# Patient Record
Sex: MALE | Race: WHITE | Employment: OTHER | URBAN - METROPOLITAN AREA
[De-identification: names, ages, dates, MRNs, and addresses within clinical notes are randomized per-mention and may not be internally consistent; named-entity substitution may affect disease eponyms.]

---

## 2018-11-19 ENCOUNTER — APPOINTMENT (OUTPATIENT)
Dept: CT IMAGING | Facility: CLINIC | Age: 83
DRG: 480 | End: 2018-11-19
Attending: EMERGENCY MEDICINE
Payer: MEDICARE

## 2018-11-19 ENCOUNTER — ANESTHESIA EVENT (OUTPATIENT)
Dept: SURGERY | Facility: CLINIC | Age: 83
DRG: 480 | End: 2018-11-19
Payer: MEDICARE

## 2018-11-19 ENCOUNTER — ANESTHESIA (OUTPATIENT)
Dept: SURGERY | Facility: CLINIC | Age: 83
DRG: 480 | End: 2018-11-19
Payer: MEDICARE

## 2018-11-19 ENCOUNTER — APPOINTMENT (OUTPATIENT)
Dept: GENERAL RADIOLOGY | Facility: CLINIC | Age: 83
DRG: 480 | End: 2018-11-19
Attending: EMERGENCY MEDICINE
Payer: MEDICARE

## 2018-11-19 ENCOUNTER — HOSPITAL ENCOUNTER (INPATIENT)
Facility: CLINIC | Age: 83
LOS: 4 days | Discharge: NURSING, REHAB OR LONG-TERM CARE | DRG: 480 | End: 2018-11-23
Attending: EMERGENCY MEDICINE | Admitting: HOSPITALIST
Payer: MEDICARE

## 2018-11-19 ENCOUNTER — APPOINTMENT (OUTPATIENT)
Dept: GENERAL RADIOLOGY | Facility: CLINIC | Age: 83
DRG: 480 | End: 2018-11-19
Attending: HOSPITALIST
Payer: MEDICARE

## 2018-11-19 ENCOUNTER — APPOINTMENT (OUTPATIENT)
Dept: CARDIOLOGY | Facility: CLINIC | Age: 83
DRG: 480 | End: 2018-11-19
Attending: PHYSICIAN ASSISTANT
Payer: MEDICARE

## 2018-11-19 DIAGNOSIS — S00.01XA ABRASION OF SCALP, INITIAL ENCOUNTER: ICD-10-CM

## 2018-11-19 DIAGNOSIS — I10 BENIGN ESSENTIAL HYPERTENSION: ICD-10-CM

## 2018-11-19 DIAGNOSIS — R55 SYNCOPE, UNSPECIFIED SYNCOPE TYPE: ICD-10-CM

## 2018-11-19 DIAGNOSIS — N17.9 ACUTE RENAL FAILURE SUPERIMPOSED ON CHRONIC KIDNEY DISEASE, UNSPECIFIED CKD STAGE, UNSPECIFIED ACUTE RENAL FAILURE TYPE: ICD-10-CM

## 2018-11-19 DIAGNOSIS — N18.9 ACUTE RENAL FAILURE SUPERIMPOSED ON CHRONIC KIDNEY DISEASE, UNSPECIFIED CKD STAGE, UNSPECIFIED ACUTE RENAL FAILURE TYPE: ICD-10-CM

## 2018-11-19 DIAGNOSIS — S72.145A CLOSED NONDISPLACED INTERTROCHANTERIC FRACTURE OF LEFT FEMUR, INITIAL ENCOUNTER (H): ICD-10-CM

## 2018-11-19 DIAGNOSIS — R33.9 URINARY RETENTION: Primary | ICD-10-CM

## 2018-11-19 PROBLEM — C61 MALIGNANT NEOPLASM OF PROSTATE (H): Status: ACTIVE | Noted: 2018-11-19

## 2018-11-19 PROBLEM — S72.143A INTERTROCHANTERIC FRACTURE (H): Status: ACTIVE | Noted: 2018-11-19

## 2018-11-19 PROBLEM — C64.9 MALIGNANT NEOPLASM OF KIDNEY EXCLUDING RENAL PELVIS (H): Status: ACTIVE | Noted: 2018-11-19

## 2018-11-19 PROBLEM — S72.142A FRACTURE, INTERTROCHANTERIC, LEFT FEMUR (H): Status: ACTIVE | Noted: 2018-11-19

## 2018-11-19 PROBLEM — H35.3130 BILATERAL NONEXUDATIVE AGE-RELATED MACULAR DEGENERATION: Status: ACTIVE | Noted: 2018-10-30

## 2018-11-19 PROBLEM — G89.29 CHRONIC MIDLINE LOW BACK PAIN WITHOUT SCIATICA: Status: ACTIVE | Noted: 2017-06-07

## 2018-11-19 PROBLEM — I63.9 CVA (CEREBRAL VASCULAR ACCIDENT) (H): Status: ACTIVE | Noted: 2017-08-29

## 2018-11-19 PROBLEM — M54.50 CHRONIC MIDLINE LOW BACK PAIN WITHOUT SCIATICA: Status: ACTIVE | Noted: 2017-06-07

## 2018-11-19 LAB
ANION GAP SERPL CALCULATED.3IONS-SCNC: 6 MMOL/L (ref 3–14)
BASOPHILS # BLD AUTO: 0 10E9/L (ref 0–0.2)
BASOPHILS NFR BLD AUTO: 0.2 %
BUN SERPL-MCNC: 41 MG/DL (ref 7–30)
CALCIUM SERPL-MCNC: 8 MG/DL (ref 8.5–10.1)
CHLORIDE SERPL-SCNC: 113 MMOL/L (ref 94–109)
CO2 SERPL-SCNC: 26 MMOL/L (ref 20–32)
CREAT SERPL-MCNC: 2.15 MG/DL (ref 0.66–1.25)
DIFFERENTIAL METHOD BLD: ABNORMAL
EOSINOPHIL # BLD AUTO: 0.2 10E9/L (ref 0–0.7)
EOSINOPHIL NFR BLD AUTO: 2.5 %
ERYTHROCYTE [DISTWIDTH] IN BLOOD BY AUTOMATED COUNT: 14 % (ref 10–15)
GFR SERPL CREATININE-BSD FRML MDRD: 29 ML/MIN/1.7M2
GLUCOSE BLDC GLUCOMTR-MCNC: 92 MG/DL (ref 70–99)
GLUCOSE SERPL-MCNC: 111 MG/DL (ref 70–99)
HCT VFR BLD AUTO: 40 % (ref 40–53)
HGB BLD-MCNC: 12.8 G/DL (ref 13.3–17.7)
IMM GRANULOCYTES # BLD: 0 10E9/L (ref 0–0.4)
IMM GRANULOCYTES NFR BLD: 0.2 %
INTERPRETATION ECG - MUSE: NORMAL
LYMPHOCYTES # BLD AUTO: 1.9 10E9/L (ref 0.8–5.3)
LYMPHOCYTES NFR BLD AUTO: 20.2 %
MCH RBC QN AUTO: 30.9 PG (ref 26.5–33)
MCHC RBC AUTO-ENTMCNC: 32 G/DL (ref 31.5–36.5)
MCV RBC AUTO: 97 FL (ref 78–100)
MONOCYTES # BLD AUTO: 0.9 10E9/L (ref 0–1.3)
MONOCYTES NFR BLD AUTO: 9.4 %
NEUTROPHILS # BLD AUTO: 6.4 10E9/L (ref 1.6–8.3)
NEUTROPHILS NFR BLD AUTO: 67.5 %
PLATELET # BLD AUTO: 214 10E9/L (ref 150–450)
POTASSIUM SERPL-SCNC: 4.6 MMOL/L (ref 3.4–5.3)
RBC # BLD AUTO: 4.14 10E12/L (ref 4.4–5.9)
SODIUM SERPL-SCNC: 145 MMOL/L (ref 133–144)
WBC # BLD AUTO: 9.5 10E9/L (ref 4–11)

## 2018-11-19 PROCEDURE — 25000132 ZZH RX MED GY IP 250 OP 250 PS 637: Performed by: ORTHOPAEDIC SURGERY

## 2018-11-19 PROCEDURE — 93306 TTE W/DOPPLER COMPLETE: CPT | Mod: 26 | Performed by: INTERNAL MEDICINE

## 2018-11-19 PROCEDURE — A9270 NON-COVERED ITEM OR SERVICE: HCPCS | Mod: GY | Performed by: PHYSICIAN ASSISTANT

## 2018-11-19 PROCEDURE — 71045 X-RAY EXAM CHEST 1 VIEW: CPT

## 2018-11-19 PROCEDURE — 93005 ELECTROCARDIOGRAM TRACING: CPT

## 2018-11-19 PROCEDURE — 25000128 H RX IP 250 OP 636: Performed by: REGISTERED NURSE

## 2018-11-19 PROCEDURE — 99285 EMERGENCY DEPT VISIT HI MDM: CPT | Mod: 25

## 2018-11-19 PROCEDURE — 27210794 ZZH OR GENERAL SUPPLY STERILE: Performed by: ORTHOPAEDIC SURGERY

## 2018-11-19 PROCEDURE — 25000128 H RX IP 250 OP 636: Performed by: ANESTHESIOLOGY

## 2018-11-19 PROCEDURE — 25000128 H RX IP 250 OP 636: Performed by: ORTHOPAEDIC SURGERY

## 2018-11-19 PROCEDURE — 80048 BASIC METABOLIC PNL TOTAL CA: CPT | Performed by: EMERGENCY MEDICINE

## 2018-11-19 PROCEDURE — 93306 TTE W/DOPPLER COMPLETE: CPT

## 2018-11-19 PROCEDURE — 36000065 ZZH SURGERY LEVEL 4 W FLUORO 1ST 30 MIN: Performed by: ORTHOPAEDIC SURGERY

## 2018-11-19 PROCEDURE — 90714 TD VACC NO PRESV 7 YRS+ IM: CPT | Performed by: EMERGENCY MEDICINE

## 2018-11-19 PROCEDURE — A9270 NON-COVERED ITEM OR SERVICE: HCPCS | Performed by: ORTHOPAEDIC SURGERY

## 2018-11-19 PROCEDURE — 85025 COMPLETE CBC W/AUTO DIFF WBC: CPT | Performed by: EMERGENCY MEDICINE

## 2018-11-19 PROCEDURE — 99223 1ST HOSP IP/OBS HIGH 75: CPT | Mod: AI | Performed by: PHYSICIAN ASSISTANT

## 2018-11-19 PROCEDURE — 25000125 ZZHC RX 250: Performed by: REGISTERED NURSE

## 2018-11-19 PROCEDURE — 37000008 ZZH ANESTHESIA TECHNICAL FEE, 1ST 30 MIN: Performed by: ORTHOPAEDIC SURGERY

## 2018-11-19 PROCEDURE — 25000128 H RX IP 250 OP 636: Performed by: PHYSICIAN ASSISTANT

## 2018-11-19 PROCEDURE — 25000128 H RX IP 250 OP 636: Performed by: EMERGENCY MEDICINE

## 2018-11-19 PROCEDURE — 96361 HYDRATE IV INFUSION ADD-ON: CPT

## 2018-11-19 PROCEDURE — 25000132 ZZH RX MED GY IP 250 OP 250 PS 637: Mod: GY | Performed by: PHYSICIAN ASSISTANT

## 2018-11-19 PROCEDURE — 70450 CT HEAD/BRAIN W/O DYE: CPT

## 2018-11-19 PROCEDURE — 71000012 ZZH RECOVERY PHASE 1 LEVEL 1 FIRST HR: Performed by: ORTHOPAEDIC SURGERY

## 2018-11-19 PROCEDURE — C1713 ANCHOR/SCREW BN/BN,TIS/BN: HCPCS | Performed by: ORTHOPAEDIC SURGERY

## 2018-11-19 PROCEDURE — 25500064 ZZH RX 255 OP 636: Performed by: HOSPITALIST

## 2018-11-19 PROCEDURE — 90471 IMMUNIZATION ADMIN: CPT

## 2018-11-19 PROCEDURE — 25800025 ZZH RX 258: Performed by: PHYSICIAN ASSISTANT

## 2018-11-19 PROCEDURE — 12000007 ZZH R&B INTERMEDIATE

## 2018-11-19 PROCEDURE — 37000009 ZZH ANESTHESIA TECHNICAL FEE, EACH ADDTL 15 MIN: Performed by: ORTHOPAEDIC SURGERY

## 2018-11-19 PROCEDURE — 00000146 ZZHCL STATISTIC GLUCOSE BY METER IP

## 2018-11-19 PROCEDURE — 73502 X-RAY EXAM HIP UNI 2-3 VIEWS: CPT

## 2018-11-19 PROCEDURE — 40000169 ZZH STATISTIC PRE-PROCEDURE ASSESSMENT I: Performed by: ORTHOPAEDIC SURGERY

## 2018-11-19 PROCEDURE — 40000277 XR SURGERY CARM FLUORO LESS THAN 5 MIN W STILLS

## 2018-11-19 PROCEDURE — 96374 THER/PROPH/DIAG INJ IV PUSH: CPT

## 2018-11-19 PROCEDURE — 0QS706Z REPOSITION LEFT UPPER FEMUR WITH INTRAMEDULLARY INTERNAL FIXATION DEVICE, OPEN APPROACH: ICD-10-PCS | Performed by: ORTHOPAEDIC SURGERY

## 2018-11-19 PROCEDURE — 71000013 ZZH RECOVERY PHASE 1 LEVEL 1 EA ADDTL HR: Performed by: ORTHOPAEDIC SURGERY

## 2018-11-19 PROCEDURE — C1769 GUIDE WIRE: HCPCS | Performed by: ORTHOPAEDIC SURGERY

## 2018-11-19 PROCEDURE — 36000063 ZZH SURGERY LEVEL 4 EA 15 ADDTL MIN: Performed by: ORTHOPAEDIC SURGERY

## 2018-11-19 PROCEDURE — 25000566 ZZH SEVOFLURANE, EA 15 MIN: Performed by: ORTHOPAEDIC SURGERY

## 2018-11-19 DEVICE — IMP SCR SYN TFNA FENESTRATED LAG 95MM 04.038.195S: Type: IMPLANTABLE DEVICE | Site: HIP | Status: FUNCTIONAL

## 2018-11-19 DEVICE — IMPLANTABLE DEVICE: Type: IMPLANTABLE DEVICE | Site: HIP | Status: FUNCTIONAL

## 2018-11-19 RX ORDER — ONDANSETRON 4 MG/1
4 TABLET, ORALLY DISINTEGRATING ORAL EVERY 6 HOURS PRN
Status: DISCONTINUED | OUTPATIENT
Start: 2018-11-19 | End: 2018-11-19

## 2018-11-19 RX ORDER — ACETAMINOPHEN 650 MG/1
650 SUPPOSITORY RECTAL EVERY 4 HOURS PRN
Status: DISCONTINUED | OUTPATIENT
Start: 2018-11-19 | End: 2018-11-19

## 2018-11-19 RX ORDER — EPHEDRINE SULFATE 50 MG/ML
INJECTION, SOLUTION INTRAMUSCULAR; INTRAVENOUS; SUBCUTANEOUS PRN
Status: DISCONTINUED | OUTPATIENT
Start: 2018-11-19 | End: 2018-11-19

## 2018-11-19 RX ORDER — MONTELUKAST SODIUM 4 MG/1
1 TABLET, CHEWABLE ORAL
Status: ON HOLD | COMMUNITY
Start: 2018-08-13 | End: 2018-11-19

## 2018-11-19 RX ORDER — AMLODIPINE BESYLATE 5 MG/1
5 TABLET ORAL DAILY
Status: ON HOLD | COMMUNITY
Start: 2018-11-05 | End: 2018-11-23

## 2018-11-19 RX ORDER — PROCHLORPERAZINE MALEATE 5 MG
5 TABLET ORAL EVERY 6 HOURS PRN
Status: DISCONTINUED | OUTPATIENT
Start: 2018-11-19 | End: 2018-11-19

## 2018-11-19 RX ORDER — AMOXICILLIN 250 MG
2 CAPSULE ORAL 2 TIMES DAILY
Status: DISCONTINUED | OUTPATIENT
Start: 2018-11-19 | End: 2018-11-23 | Stop reason: HOSPADM

## 2018-11-19 RX ORDER — HYDROMORPHONE HYDROCHLORIDE 1 MG/ML
0.2 INJECTION, SOLUTION INTRAMUSCULAR; INTRAVENOUS; SUBCUTANEOUS
Status: DISCONTINUED | OUTPATIENT
Start: 2018-11-19 | End: 2018-11-23 | Stop reason: HOSPADM

## 2018-11-19 RX ORDER — POLYETHYLENE GLYCOL 3350 17 G/17G
17 POWDER, FOR SOLUTION ORAL DAILY PRN
Status: DISCONTINUED | OUTPATIENT
Start: 2018-11-19 | End: 2018-11-19

## 2018-11-19 RX ORDER — ACETAMINOPHEN 325 MG/1
975 TABLET ORAL 3 TIMES DAILY
Status: DISCONTINUED | OUTPATIENT
Start: 2018-11-19 | End: 2018-11-19

## 2018-11-19 RX ORDER — GLYCOPYRROLATE 0.2 MG/ML
INJECTION, SOLUTION INTRAMUSCULAR; INTRAVENOUS PRN
Status: DISCONTINUED | OUTPATIENT
Start: 2018-11-19 | End: 2018-11-19

## 2018-11-19 RX ORDER — HYDROMORPHONE HYDROCHLORIDE 1 MG/ML
.3-.5 INJECTION, SOLUTION INTRAMUSCULAR; INTRAVENOUS; SUBCUTANEOUS
Status: DISCONTINUED | OUTPATIENT
Start: 2018-11-19 | End: 2018-11-19

## 2018-11-19 RX ORDER — CEFAZOLIN SODIUM 2 G/100ML
2 INJECTION, SOLUTION INTRAVENOUS
Status: COMPLETED | OUTPATIENT
Start: 2018-11-19 | End: 2018-11-19

## 2018-11-19 RX ORDER — PROPOFOL 10 MG/ML
INJECTION, EMULSION INTRAVENOUS PRN
Status: DISCONTINUED | OUTPATIENT
Start: 2018-11-19 | End: 2018-11-19

## 2018-11-19 RX ORDER — VIT A/VIT C/VIT E/ZINC/COPPER 2148-113
1 TABLET ORAL 2 TIMES DAILY
COMMUNITY
Start: 2018-10-30

## 2018-11-19 RX ORDER — GABAPENTIN 300 MG/1
1500 CAPSULE ORAL AT BEDTIME
COMMUNITY
Start: 2018-10-30

## 2018-11-19 RX ORDER — ONDANSETRON 2 MG/ML
4 INJECTION INTRAMUSCULAR; INTRAVENOUS EVERY 6 HOURS PRN
Status: DISCONTINUED | OUTPATIENT
Start: 2018-11-19 | End: 2018-11-19

## 2018-11-19 RX ORDER — PROCHLORPERAZINE 25 MG
12.5 SUPPOSITORY, RECTAL RECTAL EVERY 12 HOURS PRN
Status: DISCONTINUED | OUTPATIENT
Start: 2018-11-19 | End: 2018-11-19

## 2018-11-19 RX ORDER — ONDANSETRON 2 MG/ML
4 INJECTION INTRAMUSCULAR; INTRAVENOUS EVERY 6 HOURS PRN
Status: DISCONTINUED | OUTPATIENT
Start: 2018-11-19 | End: 2018-11-23 | Stop reason: HOSPADM

## 2018-11-19 RX ORDER — DEXAMETHASONE SODIUM PHOSPHATE 4 MG/ML
INJECTION, SOLUTION INTRA-ARTICULAR; INTRALESIONAL; INTRAMUSCULAR; INTRAVENOUS; SOFT TISSUE PRN
Status: DISCONTINUED | OUTPATIENT
Start: 2018-11-19 | End: 2018-11-19

## 2018-11-19 RX ORDER — ONDANSETRON 4 MG/1
4 TABLET, ORALLY DISINTEGRATING ORAL EVERY 6 HOURS PRN
Status: DISCONTINUED | OUTPATIENT
Start: 2018-11-19 | End: 2018-11-23 | Stop reason: HOSPADM

## 2018-11-19 RX ORDER — MORPHINE SULFATE 2 MG/ML
2 INJECTION, SOLUTION INTRAMUSCULAR; INTRAVENOUS ONCE
Status: COMPLETED | OUTPATIENT
Start: 2018-11-19 | End: 2018-11-19

## 2018-11-19 RX ORDER — NEOSTIGMINE METHYLSULFATE 1 MG/ML
VIAL (ML) INJECTION PRN
Status: DISCONTINUED | OUTPATIENT
Start: 2018-11-19 | End: 2018-11-19

## 2018-11-19 RX ORDER — LIDOCAINE HYDROCHLORIDE 20 MG/ML
INJECTION, SOLUTION INFILTRATION; PERINEURAL PRN
Status: DISCONTINUED | OUTPATIENT
Start: 2018-11-19 | End: 2018-11-19

## 2018-11-19 RX ORDER — SODIUM CHLORIDE 9 MG/ML
INJECTION, SOLUTION INTRAVENOUS CONTINUOUS
Status: DISCONTINUED | OUTPATIENT
Start: 2018-11-19 | End: 2018-11-20

## 2018-11-19 RX ORDER — NALOXONE HYDROCHLORIDE 0.4 MG/ML
.1-.4 INJECTION, SOLUTION INTRAMUSCULAR; INTRAVENOUS; SUBCUTANEOUS
Status: DISCONTINUED | OUTPATIENT
Start: 2018-11-19 | End: 2018-11-19

## 2018-11-19 RX ORDER — AMOXICILLIN 250 MG
2 CAPSULE ORAL 2 TIMES DAILY PRN
Status: DISCONTINUED | OUTPATIENT
Start: 2018-11-19 | End: 2018-11-19

## 2018-11-19 RX ORDER — ONDANSETRON 2 MG/ML
INJECTION INTRAMUSCULAR; INTRAVENOUS PRN
Status: DISCONTINUED | OUTPATIENT
Start: 2018-11-19 | End: 2018-11-19

## 2018-11-19 RX ORDER — LOPERAMIDE HCL 2 MG
2 CAPSULE ORAL 2 TIMES DAILY PRN
COMMUNITY

## 2018-11-19 RX ORDER — ACETAMINOPHEN 325 MG/1
975 TABLET ORAL EVERY 8 HOURS
Status: COMPLETED | OUTPATIENT
Start: 2018-11-19 | End: 2018-11-22

## 2018-11-19 RX ORDER — AMOXICILLIN 250 MG
1 CAPSULE ORAL 2 TIMES DAILY
Status: DISCONTINUED | OUTPATIENT
Start: 2018-11-19 | End: 2018-11-23 | Stop reason: HOSPADM

## 2018-11-19 RX ORDER — OXYCODONE HYDROCHLORIDE 5 MG/1
5-10 TABLET ORAL
Status: DISCONTINUED | OUTPATIENT
Start: 2018-11-19 | End: 2018-11-19

## 2018-11-19 RX ORDER — BISACODYL 10 MG
10 SUPPOSITORY, RECTAL RECTAL DAILY PRN
Status: DISCONTINUED | OUTPATIENT
Start: 2018-11-19 | End: 2018-11-19

## 2018-11-19 RX ORDER — FENTANYL CITRATE 50 UG/ML
INJECTION, SOLUTION INTRAMUSCULAR; INTRAVENOUS PRN
Status: DISCONTINUED | OUTPATIENT
Start: 2018-11-19 | End: 2018-11-19

## 2018-11-19 RX ORDER — BENZTROPINE MESYLATE 1 MG/1
1-2 TABLET ORAL 3 TIMES DAILY PRN
Status: DISCONTINUED | OUTPATIENT
Start: 2018-11-19 | End: 2018-11-19

## 2018-11-19 RX ORDER — ONDANSETRON 2 MG/ML
4 INJECTION INTRAMUSCULAR; INTRAVENOUS EVERY 6 HOURS
Status: DISPENSED | OUTPATIENT
Start: 2018-11-19 | End: 2018-11-20

## 2018-11-19 RX ORDER — LANOLIN ALCOHOL/MO/W.PET/CERES
1000 CREAM (GRAM) TOPICAL DAILY
COMMUNITY
Start: 2018-10-30

## 2018-11-19 RX ORDER — SODIUM CHLORIDE 9 MG/ML
INJECTION, SOLUTION INTRAVENOUS CONTINUOUS
Status: DISCONTINUED | OUTPATIENT
Start: 2018-11-19 | End: 2018-11-19

## 2018-11-19 RX ORDER — CEFAZOLIN SODIUM 1 G/3ML
1 INJECTION, POWDER, FOR SOLUTION INTRAMUSCULAR; INTRAVENOUS SEE ADMIN INSTRUCTIONS
Status: DISCONTINUED | OUTPATIENT
Start: 2018-11-19 | End: 2018-11-19 | Stop reason: HOSPADM

## 2018-11-19 RX ORDER — AMOXICILLIN 250 MG
1 CAPSULE ORAL 2 TIMES DAILY PRN
Status: DISCONTINUED | OUTPATIENT
Start: 2018-11-19 | End: 2018-11-19

## 2018-11-19 RX ORDER — NALOXONE HYDROCHLORIDE 0.4 MG/ML
.1-.4 INJECTION, SOLUTION INTRAMUSCULAR; INTRAVENOUS; SUBCUTANEOUS
Status: DISCONTINUED | OUTPATIENT
Start: 2018-11-19 | End: 2018-11-23 | Stop reason: HOSPADM

## 2018-11-19 RX ORDER — ASPIRIN 81 MG/1
81 TABLET ORAL DAILY
Status: ON HOLD | COMMUNITY
Start: 2018-10-30 | End: 2018-11-20

## 2018-11-19 RX ORDER — CEFAZOLIN SODIUM 2 G/100ML
2 INJECTION, SOLUTION INTRAVENOUS EVERY 8 HOURS
Status: COMPLETED | OUTPATIENT
Start: 2018-11-19 | End: 2018-11-20

## 2018-11-19 RX ORDER — PROCHLORPERAZINE MALEATE 5 MG
5 TABLET ORAL EVERY 6 HOURS PRN
Status: DISCONTINUED | OUTPATIENT
Start: 2018-11-19 | End: 2018-11-23 | Stop reason: HOSPADM

## 2018-11-19 RX ORDER — ACETAMINOPHEN 325 MG/1
650 TABLET ORAL EVERY 4 HOURS PRN
Status: DISCONTINUED | OUTPATIENT
Start: 2018-11-22 | End: 2018-11-23 | Stop reason: HOSPADM

## 2018-11-19 RX ORDER — LIDOCAINE 40 MG/G
CREAM TOPICAL
Status: DISCONTINUED | OUTPATIENT
Start: 2018-11-19 | End: 2018-11-23 | Stop reason: HOSPADM

## 2018-11-19 RX ORDER — SODIUM CHLORIDE, SODIUM LACTATE, POTASSIUM CHLORIDE, CALCIUM CHLORIDE 600; 310; 30; 20 MG/100ML; MG/100ML; MG/100ML; MG/100ML
INJECTION, SOLUTION INTRAVENOUS CONTINUOUS
Status: DISCONTINUED | OUTPATIENT
Start: 2018-11-19 | End: 2018-11-19 | Stop reason: HOSPADM

## 2018-11-19 RX ORDER — OXYCODONE HYDROCHLORIDE 5 MG/1
5 TABLET ORAL
Status: DISCONTINUED | OUTPATIENT
Start: 2018-11-19 | End: 2018-11-23 | Stop reason: HOSPADM

## 2018-11-19 RX ADMIN — GABAPENTIN 1500 MG: 400 CAPSULE ORAL at 21:35

## 2018-11-19 RX ADMIN — FENTANYL CITRATE 25 MCG: 50 INJECTION, SOLUTION INTRAMUSCULAR; INTRAVENOUS at 16:50

## 2018-11-19 RX ADMIN — SODIUM CHLORIDE 1000 ML: 9 INJECTION, SOLUTION INTRAVENOUS at 08:06

## 2018-11-19 RX ADMIN — FENTANYL CITRATE 50 MCG: 50 INJECTION, SOLUTION INTRAMUSCULAR; INTRAVENOUS at 16:10

## 2018-11-19 RX ADMIN — LIDOCAINE HYDROCHLORIDE 100 MG: 20 INJECTION, SOLUTION INFILTRATION; PERINEURAL at 15:46

## 2018-11-19 RX ADMIN — CEFAZOLIN SODIUM 2 G: 2 INJECTION, SOLUTION INTRAVENOUS at 20:22

## 2018-11-19 RX ADMIN — NEOSTIGMINE METHYLSULFATE 4 MG: 1 INJECTION, SOLUTION INTRAVENOUS at 16:41

## 2018-11-19 RX ADMIN — Medication 10 MG: at 15:52

## 2018-11-19 RX ADMIN — FENTANYL CITRATE 50 MCG: 50 INJECTION, SOLUTION INTRAMUSCULAR; INTRAVENOUS at 16:21

## 2018-11-19 RX ADMIN — CLOSTRIDIUM TETANI TOXOID ANTIGEN (FORMALDEHYDE INACTIVATED) AND CORYNEBACTERIUM DIPHTHERIAE TOXOID ANTIGEN (FORMALDEHYDE INACTIVATED) 0.5 ML: 5; 2 INJECTION, SUSPENSION INTRAMUSCULAR at 10:07

## 2018-11-19 RX ADMIN — ACETAMINOPHEN 975 MG: 325 TABLET, FILM COATED ORAL at 21:35

## 2018-11-19 RX ADMIN — RANITIDINE 150 MG: 150 TABLET ORAL at 21:36

## 2018-11-19 RX ADMIN — FENTANYL CITRATE 25 MCG: 50 INJECTION, SOLUTION INTRAMUSCULAR; INTRAVENOUS at 16:43

## 2018-11-19 RX ADMIN — PROPOFOL 130 MG: 10 INJECTION, EMULSION INTRAVENOUS at 15:46

## 2018-11-19 RX ADMIN — GLYCOPYRROLATE 0.6 MG: 0.2 INJECTION, SOLUTION INTRAMUSCULAR; INTRAVENOUS at 16:41

## 2018-11-19 RX ADMIN — HYDROMORPHONE HYDROCHLORIDE 0.5 MG: 1 INJECTION, SOLUTION INTRAMUSCULAR; INTRAVENOUS; SUBCUTANEOUS at 16:40

## 2018-11-19 RX ADMIN — SENNOSIDES AND DOCUSATE SODIUM 1 TABLET: 8.6; 5 TABLET ORAL at 20:22

## 2018-11-19 RX ADMIN — MORPHINE SULFATE 2 MG: 2 INJECTION, SOLUTION INTRAMUSCULAR; INTRAVENOUS at 08:21

## 2018-11-19 RX ADMIN — ACETAMINOPHEN 975 MG: 325 TABLET, FILM COATED ORAL at 11:19

## 2018-11-19 RX ADMIN — HUMAN ALBUMIN MICROSPHERES AND PERFLUTREN 9 ML: 10; .22 INJECTION, SOLUTION INTRAVENOUS at 15:45

## 2018-11-19 RX ADMIN — CEFAZOLIN SODIUM 2 G: 2 INJECTION, SOLUTION INTRAVENOUS at 16:06

## 2018-11-19 RX ADMIN — ONDANSETRON 4 MG: 2 INJECTION INTRAMUSCULAR; INTRAVENOUS at 16:34

## 2018-11-19 RX ADMIN — DEXTROSE AND SODIUM CHLORIDE: 5; 450 INJECTION, SOLUTION INTRAVENOUS at 11:19

## 2018-11-19 RX ADMIN — ROCURONIUM BROMIDE 40 MG: 10 INJECTION INTRAVENOUS at 15:46

## 2018-11-19 RX ADMIN — Medication 10 MG: at 16:34

## 2018-11-19 RX ADMIN — ONDANSETRON 4 MG: 2 INJECTION INTRAMUSCULAR; INTRAVENOUS at 21:35

## 2018-11-19 RX ADMIN — FENTANYL CITRATE 50 MCG: 50 INJECTION, SOLUTION INTRAMUSCULAR; INTRAVENOUS at 15:46

## 2018-11-19 RX ADMIN — SODIUM CHLORIDE: 9 INJECTION, SOLUTION INTRAVENOUS at 20:23

## 2018-11-19 RX ADMIN — DEXAMETHASONE SODIUM PHOSPHATE 4 MG: 4 INJECTION, SOLUTION INTRA-ARTICULAR; INTRALESIONAL; INTRAMUSCULAR; INTRAVENOUS; SOFT TISSUE at 16:04

## 2018-11-19 RX ADMIN — SODIUM CHLORIDE, POTASSIUM CHLORIDE, SODIUM LACTATE AND CALCIUM CHLORIDE: 600; 310; 30; 20 INJECTION, SOLUTION INTRAVENOUS at 15:40

## 2018-11-19 ASSESSMENT — ACTIVITIES OF DAILY LIVING (ADL)
COGNITION: 0 - NO COGNITION ISSUES REPORTED
FALL_HISTORY_WITHIN_LAST_SIX_MONTHS: YES
RETIRED_COMMUNICATION: 0-->UNDERSTANDS/COMMUNICATES WITHOUT DIFFICULTY
RETIRED_EATING: 0-->INDEPENDENT
SWALLOWING: 0-->SWALLOWS FOODS/LIQUIDS WITHOUT DIFFICULTY
AMBULATION: 0-->INDEPENDENT
TRANSFERRING: 0-->INDEPENDENT
ADLS_ACUITY_SCORE: 9
WHICH_OF_THE_ABOVE_FUNCTIONAL_RISKS_HAD_A_RECENT_ONSET_OR_CHANGE?: AMBULATION;TRANSFERRING;TOILETING;BATHING
ADLS_ACUITY_SCORE: 13
TOILETING: 0-->INDEPENDENT
BATHING: 0-->INDEPENDENT
NUMBER_OF_TIMES_PATIENT_HAS_FALLEN_WITHIN_LAST_SIX_MONTHS: 11
DRESS: 0-->INDEPENDENT

## 2018-11-19 ASSESSMENT — ENCOUNTER SYMPTOMS
NUMBNESS: 0
ABDOMINAL PAIN: 0
HEADACHES: 0
BACK PAIN: 0
SHORTNESS OF BREATH: 0
VOMITING: 0
LIGHT-HEADEDNESS: 0
NAUSEA: 0
WEAKNESS: 0
DIARRHEA: 0
DIZZINESS: 1
FEVER: 0
NECK PAIN: 0
MYALGIAS: 1
WOUND: 1

## 2018-11-19 NOTE — PHARMACY-ADMISSION MEDICATION HISTORY
Admission medication history interview status for the 11/19/2018  admission is complete. See EPIC admission navigator for prior to admission medications     Medication history source reliability:Good    Actions taken by pharmacist (provider contacted, etc): Spoke with patient. He was not sure of all medications. Verified with Violeta Jennings and care everywhere    Additional medication history information not noted on PTA med list :None    Medication reconciliation/reorder completed by provider prior to medication history? No    Time spent in this activity: 30 mins    Prior to Admission medications    Medication Sig Last Dose Taking? Auth Provider   amLODIPine (NORVASC) 5 MG tablet Take 5 mg by mouth daily  11/18/2018 at am Yes Reported, Patient   aspirin 81 MG EC tablet Take 81 mg by mouth daily  11/18/2018 at am Yes Reported, Patient   cyanocobalamin (VITAMIN  B-12) 1000 MCG tablet Take 1,000 mcg by mouth daily  Unknown Yes Reported, Patient   gabapentin (NEURONTIN) 300 MG capsule Take 1,500 mg by mouth At Bedtime  11/18/2018 at hs Yes Reported, Patient   loperamide (IMODIUM) 2 MG capsule Take 2 mg by mouth 2 times daily as needed for diarrhea prn Yes Unknown, Entered By History   Multiple Vitamins-Minerals (PRESERVISION AREDS) TABS Take 1 tablet by mouth 2 times daily  11/18/2018 at pm Yes Reported, Patient

## 2018-11-19 NOTE — ED PROVIDER NOTES
History     Chief Complaint:  Fall    HPI   Sourav Fine is a 89 year old male with a history of hypertension, cancer, and CVA who presents to the ED via EMS for evaluation after a fall. The patient reports feeling well going to bed last night. This morning, around 0600, he got up to use the bathroom and while in the bathroom, he became very dizzy and unsteady, causing him to fall to the ground. He hit his head and left hip on the floor during the fall but denies any loss of consciousness. EMS were unable to ambulate the patient due to his non-bearing left side. Here, patient reports still having signficiant left hip pain. He also suffered a laceration to the left posterior skull during the fall. Bleeding is controlled on arrival. Patient is not anticoagulated. He has no history of hip fractures. He denies any chest pain or shortness of breath preceding the fall. The patient reports that he does not usually feel dizzy with standing. He did start amlodipine last week but stopped taking it after one dose due to increased dizziness. He has not been taking this medication since that one dose. Last td was in 2005.    Allergies:  No known drug allergies    Medications:    amLODIPine (NORVASC) 5 MG tablet  aspirin 81 MG EC tablet  colestipol (COLESTID) 1 g tablet  cyanocobalamin (VITAMIN  B-12) 1000 MCG tablet  gabapentin (NEURONTIN) 300 MG capsule  Multiple Vitamins-Minerals (PRESERVISION AREDS) TABS     Past Medical History:    Arthritis  Malignant neoplasm of prostate  Bilateral nonexudative age-related macular degeneration  Malignant neoplasm of kidney excluding renal pelvis  Hypertension   Cervicalgia  Neuropathy  ED  Other nonallopathic lesion of cervical region  ILIR  Malignant neoplasm of skin  Low vitamin B12 level  CVA  Chronic midline low back pain without sciatica    Past Surgical History:    Orthopedic surgery    Family History:    History reviewed. No pertinent family history.     Social History:  Smoking  "status: Former  Alcohol use: Yes  Marital Status:       Review of Systems   Constitutional: Negative for fever.   Respiratory: Negative for shortness of breath.    Cardiovascular: Negative for chest pain.   Gastrointestinal: Negative for abdominal pain, diarrhea, nausea and vomiting.   Musculoskeletal: Positive for gait problem and myalgias (left hip). Negative for back pain and neck pain.   Skin: Positive for wound (left posterior skull).   Neurological: Positive for dizziness. Negative for syncope, weakness, light-headedness, numbness and headaches.   All other systems reviewed and are negative.    Physical Exam   First Vitals:  BP: 169/82  Heart Rate: 63  Temp: 98  F (36.7  C)  Resp: 16  Height: 177.8 cm (5' 10\")  Weight: 81.6 kg (180 lb)  SpO2: 97 %    Physical Exam  General:  Sitting on bed. Pt in no significant distress.  Talkative.   HENT:  No obvious trauma to head aside from a abrasion to the left posterior skull. Negative garland's sign and negative raccoon eyes bilaterally.  Right Ear:  External ear normal.   Left Ear:  External ear normal.   Nose:  Nose normal. No epistaxis.  Eyes:  Conjunctivae and EOM are normal. Pupils are equal, round, and reactive.   Neck: Normal range of motion. Neck supple. No tracheal deviation present. No midline cervical neck tenderness, deformity, step off or pain in the midline with ROM.  CV:  Normal heart sounds. No murmur heard.  Pulm/Chest: Effort normal and breath sounds normal.   Abd: Soft. No distension. There is no tenderness. There is no rigidity, no rebound and no guarding.   M/S: Normal range of motion. No pain to palpation or deformity of all 4 extremities other than positive pain to left proximal hip and with internal/external rotation of hip. Pelvis stable to compression. No pain to palpation of step off to thoracic spine. DP pulses +1 bilaterally.  Neuro: Alert. CN II-XII Grossly intact. GCS 15.  Skin: Skin is warm and dry. No rash noted. Not diaphoretic. "   Psych: Normal mood and affect. Behavior is normal.     Emergency Department Course   ECG (07:56:42):  Rate 63 bpm. ME interval 188. QRS duration 142. QT/QTc 458/468. P-R-T axes 76 -75 52. Normal sinus rhythm. Right bundle branch block. Left anterior fascicular block. Bifascicular block. Abnormal ECG. Interpreted at 0757 by David Denton DO.    Imaging:  Radiographic findings were communicated with the patient who voiced understanding of the findings.    CT-scan Head w/o contrast:  IMPRESSION:  No acute intracranial abnormality. Old left frontal infarct.  Result per radiology.     X-ray Left Pelvis w/ Hip, 1 views:  IMPRESSION:  Minimally displaced intertrochanteric fracture of the left hip. There are degenerative changes of both hips, left worse than right. Prostate seeds noted.  Result per radiology.     X-ray Chest, 2 views:  IMPRESSION: Heart size upper normal but similar to prior. Pulmonary vascularity within normal limits. The lungs are clear. No pneumothorax or pleural effusion. There are lower left lateral rib fractures of uncertain chronicity.  Result per radiology.     Laboratory:  CBC: HGB 12.8 (L) o/w WNL (WBC 9.5, )  BMP:  (H), Glucose 111 (H), BUN 41 (H), Chloride 113 (H), Creatinine 2.15 (H), Calcium 8.0 (L), GFR 29 (L) o/w WNL  0803 - Glucose: 92    Interventions:  0806: NS 1L IV Bolus  0821: Morphine 2mg IV injection  Td 0.5 ml IM    Emergency Department Course:  The patient arrived in the emergency department via EMS.  Past medical records, nursing notes, and vitals reviewed.  0810: I performed an exam of the patient and obtained history, as documented above. GCS 15.    IV inserted and blood drawn.    The patient was sent for a CT while in the emergency department, findings above.    0835: Orthopedics notified and made aware of the hip fracture.    Findings and plan explained to the Patient who consents to admission.     0837: Discussed the patient with SOFÍA Sosa of  orthopedics, who will admit the patient to a medical bed for further monitoring, evaluation, and treatment.     0848: I discussed the case with Dr. Clemente regarding the patient.    Impression & Plan      Medical Decision Making:  Sourav Fine is a very pleasant 89 year old male who presents for evaluation of left hip pain after a fall this morning.  The cause of the fall is syncope.  The patient has had dizziness for the past week periodically.  He reports it started after starting amiodarone.  He has not been on this since then.  He has no dizziness now.  The patient got up to use the bathroom, quickly turned his head to close the door behind him and felt the onset of dizziness.  He immediately fell and hit his head.  There is no LOC.  A CT of the head shows no intracranial bleed.  The patient's scalp has an abrasion but no laceration requiring repair CMS is intact distally in the extremity. Td updated today. Xrays reveal a fracture of the hip that will need orthopedic consultation and likely surgery.  The patients head to toe trauma exam is otherwise normal at this time and no further trauma workup is needed as I believe there is no signs of serious head, neck, chest, spinal, extremity or abdominal injuries.  The patient does have acute on chronic renal insufficiency.  This may be contributing to his syncope.  The patient is hemodynamically stable and hemoglobin is normal.  There is no fever or leukocytosis to suggest infection.  He has no flank pain to suggest kidney stone.  Will admit to medicine for further cares and ortho consultation.  Pain control achieved.      Diagnosis:    ICD-10-CM   1. Closed nondisplaced intertrochanteric fracture of left femur, initial encounter (H) S72.145A   2. Abrasion of scalp, initial encounter S00.01XA   3. Syncope, unspecified syncope type R55       Disposition:  Admitted to Dr. Clemente.      Sheila Sahni  11/19/2018    EMERGENCY DEPARTMENT  I, Sheila Sahni, am serving as a  scribe at 8:10 AM on 11/19/2018 to document services personally performed by David Denton DO based on my observations and the provider's statements to me.        David Denton DO  11/19/18 0929

## 2018-11-19 NOTE — IP AVS SNAPSHOT
` ` Patient Information     Patient Name Sex     Sourav Fine ([id number]) Male 11/15/1925       Room Bed    5504 5504-02      Patient Demographics     Address Phone    [address]   [place] [address] [phone] (Home)  [phone] (Work)  [phone] (Mobile)      Patient Ethnicity & Race     Ethnic Group Patient Race     White      Emergency Contact(s)     Name Relation Home Work Mobile    Opal Fine Spouse [phone]        Documents on File        Status Date Received Description       Documents for the Patient    Privacy Notice - [place] Received 12     Insurance Card Received 12     External Medication Information Consent       Patient ID Received 12     Consent for Services - Hospital/Clinic Received () 12     Consent for EHR Access  13 Copied from existing Consent for services - C/HOD collected on 2012    [facility] Specified Other       Insurance Card Received 14     Insurance Card Received 14     Consent for Services - Hospital/Clinic Received () 14     Consent for Services - Hospital and Clinic Received 18     HIE Auth Received 18        Documents for the Encounter    CMS IM for Patient Signature Received 18     EMS/Ambulance Record  18 [place] DEPARTMENT      Admission Information     Attending Provider Admitting Provider Admission Type Admission Date/Time    Mingo Wang MD Nemanich, Joseph Patrick, MD Emergency 18  0747    Discharge Date Hospital Service Auth/Cert Status Service Area     Hospitalist [facility] SERVICES    Unit Room/Bed Admission Status       [facility] ORTHO SPEC UNIT 5504/5504-02 Admission (Confirmed)       Admission     Complaint    Intertrochanteric fracture (H), HIP FRACTURE., Fracture, intertrochanteric, left femur (H)      Hospital Account     Name Acct ID Class Status Primary Coverage    Sourav Fine [id number] Inpatient Open  MEDICARE - MEDICARE FOR HB SUPPLEMENT            Guarantor Account (for Hospital Account #[id number])     Name Relation to Pt Service Area Active? Acct Type    Sourav Fine Self FCS Yes Personal/Family    Address Phone          [address]  [place] [address] [phone](H)  [phone](O)              Coverage Information (for Hospital Account #[id number])     1. MEDICARE/MEDICARE FOR HB SUPPLEMENT     F/O Payor/Plan Precert #    MEDICARE/MEDICARE FOR HB SUPPLEMENT     Subscriber Subscriber #    Sourav Fine [id number]    Address Phone    [employer] CLAIMS  [address]  [place] [address] [phone]          2. MEDICA/MEDICA PRIME SOLUTION     F/O Payor/Plan Precert #    MEDICA/MEDICA PRIME SOLUTION     Subscriber Subscriber #    Sourav Fine [id number]    Address Phone    [address] [address]  [place] [address] [phone]

## 2018-11-19 NOTE — PLAN OF CARE
Problem: Patient Care Overview  Goal: Plan of Care/Patient Progress Review  PT/OT: Evaluation orders received, pt currently on bed rest with plan for OR this evening secondary to intertrochanteric hip fracture, post fall. Will plan for evaluations post op.

## 2018-11-19 NOTE — PROGRESS NOTES
Aware of patient in ED with left intertrochanteric femur fracture    Will plan for OR this evening as OR time permits for ORIF  NPO  Hold ASA  Bed rest  Pain medication as needed  Hospitalist for pre-op optimization    Formal consult to follow  Sheila King PAC

## 2018-11-19 NOTE — PLAN OF CARE
Problem: Patient Care Overview  Goal: Plan of Care/Patient Progress Review  Outcome: No Change  Pt A/O X4. VSS on RA. CMS intact. Head laceration dried drainage, open to air. Bedrest. Due to void. Tele. Managing pain with tylenol. Went for surgery at 1430.

## 2018-11-19 NOTE — ED NOTES
"[facility]  ED Nurse Handoff Report    ED Chief complaint: Fall (patient felt dizzy this AM, walked to bathroom and fell.  Lac to R head.  No LOC.  No thinners.  C/O L hip pain.  CMS intact.  Pt was unable to ambulate after fall.  Normally independent.)      ED Diagnosis:   Final diagnoses:   Closed nondisplaced intertrochanteric fracture of left femur, initial encounter (H)       Code Status: Full Code    Allergies: No Known Allergies    Activity level - Baseline/Home:  Independent    Activity Level - Current:   Bedrest d/t fx hip     Needed?: No    Isolation: No  Infection: Not Applicable  Bariatric?: No    Vital Signs:   Vitals:    11/19/18 0748   BP: 169/82   Resp: 16   Temp: 98  F (36.7  C)   TempSrc: Oral   SpO2: 97%   Weight: 81.6 kg (180 lb)   Height: 1.778 m (5' 10\")       Cardiac Rhythm: ,   Cardiac  Cardiac Rhythm: Normal sinus rhythm    Pain level: 0-10 Pain Scale: 9    Is this patient confused?: No   [place] - Suicide Severity Rating Scale Completed?  Yes  If yes, what color did the patient score?  White    Patient Report: Initial Complaint: Fall  Focused Assessment: Pt up to bathroom this AM, felt dizzy and fell sustaining L hip fx.  Also hit head, small lac, CT head negative.  Pt normally independent, lives with spouse.  A/o x4.    Tests Performed: blood work/EKG/CT/XR  Abnormal Results: creatinine  2.15  Treatments provided: IVF, MS 2mg    Family Comments: none, patient reports spouse on the way    OBS brochure/video discussed/provided to patient/family: N/A              Name of person given brochure if not patient:               Relationship to patient:     ED Medications:   Medications   0.9% sodium chloride BOLUS (1,000 mLs Intravenous New Bag 11/19/18 0806)   morphine (PF) injection 2 mg (2 mg Intravenous Given 11/19/18 0821)       Drips infusing?:  No    For the majority of the shift this patient was Green.   Interventions performed were .    Severe Sepsis OR Septic " Shock Diagnosis Present: No    To be done/followed up on inpatient unit:  na    ED NURSE PHONE NUMBER: *[phone]

## 2018-11-19 NOTE — PLAN OF CARE
"Problem: Patient Care Overview  Goal: Plan of Care/Patient Progress Review  PT/OT: Orders received, chart reviewed. Noting \"plan for OR this evening pending pre-operative assessment\" for repair of L hip fracture. Evals re-scheduled. Please update activity/weightbearing orders as appropriate post-op.      "

## 2018-11-19 NOTE — ED NOTES
Bed: ED13  Expected date: 11/19/18  Expected time: 7:33 AM  Means of arrival: Ambulance  Comments:  [place] 89m fall, head lac, hip pain ETA 3845

## 2018-11-19 NOTE — IP AVS SNAPSHOT
"` `     [place] [address] [facility]: [phone]                                              INTERAGENCY TRANSFER FORM - NURSING   2018                    Hospital Admission Date: 2018  NOEMI MORALES   : 11/15/1925  Sex: Male        Attending Provider: Mingo Wang MD     Allergies:  No Known Allergies    Infection:  None   Service:  HOSPITALIST    Ht:  1.778 m (5' 10\")   Wt:  81.6 kg (180 lb)   Admission Wt:  81.6 kg (180 lb)    BMI:  25.83 kg/m 2   BSA:  2.01 m 2            Patient PCP Information     Provider PCP Type    Nick Finney MD General      Current Code Status     Date Active Code Status Order ID Comments User Context       Prior      Code Status History     Date Active Date Inactive Code Status Order ID Comments User Context    2018 10:30 AM 2018  6:48 PM DNR/DNI [id number]  Jes Durham PA-C Inpatient      Advance Directives        Scanned docmt in ACP Activity?           No scanned doc        Hospital Problems as of 2018              Priority Class Noted POA    Intertrochanteric fracture (H) Medium  2018 Yes    Fracture, intertrochanteric, left femur (H) Medium  2018 Yes      Non-Hospital Problems as of 2018              Priority Class Noted    Cervicalgia Medium  2006    Other nonallopathic lesion of cervical region Medium  2006    Essential hypertension Medium  2011    Arthritis of knee Medium  2011    Neuropathy Medium  10/25/2011    Erectile dysfunction Medium  2012    Malignant neoplasm of skin Medium  2013    Low vitamin B12 level Medium  3/18/2014    ILIR (obstructive sleep apnea) Medium  2016    Chronic midline low back pain without sciatica Medium  2017    CVA (cerebral vascular accident) (H) Medium  2017    Bilateral nonexudative age-related macular degeneration Medium  10/30/2018    Malignant neoplasm of kidney excluding renal pelvis (H) Medium  " 11/19/2018    Malignant neoplasm of prostate (H) Medium  11/19/2018      Immunizations     Name Date      TD (ADULT, 7+) 11/19/18          END      ASSESSMENT     Discharge Profile Flowsheet     EXPECTED DISCHARGE     Resources List  Skilled Nursing Facility 11/23/18 1140    Expected Discharge Date  11/22/18 (to 11/23 TCU) 11/21/18 0700   Skilled Nursing Facility  [facility] ([facility]) [phone], Fax:[phone] 11/23/18 1140    DISCHARGE NEEDS ASSESSMENT     PAS Number  [id number] 11/23/18 1140    Equipment Currently Used at Home  none 11/20/18 1129   SKIN      # of Referrals Placed by CTS  Post Acute Facilities 11/22/18 1224   Inspection of bony prominences  Full 11/23/18 1232    GASTROINTESTINAL (ADULT,PEDIATRIC,OB)     Focused inspection of bony prominences  buttock, left 11/22/18 0901    GI WDL  ex 11/23/18 1232   Full except areas not inspected   Buttock, left;Sacrum;Buttock, right;Coccyx 11/22/18 1204    Abdominal Appearance  rounded 11/23/18 1232   Inspection under devices  -- 11/23/18 0042    All Quadrants Bowel Sounds  audible and normoactive 11/23/18 0042   Skin WDL  ex 11/23/18 1232    All Quadrants Palpation  soft/nontender 11/23/18 0042   Skin Integrity  drain/device(s);incision(s) 11/23/18 1232    Last Bowel Movement  11/20/18 11/21/18 1021   SAFETY      Passing flatus  yes 11/23/18 1232   Safety WDL  WDL 11/23/18 1232    COMMUNICATION ASSESSMENT     Safety Factors  bed in low position;wheels locked;call light in reach;upper side rails raised x 2;ID band on 11/23/18 1232    Patient's communication style  spoken language (English or Bilingual) 11/19/18 0747   All Alarms  alarm(s) activated and audible 11/23/18 1232    FINAL RESOURCES                        Assessment WDL (Within Defined Limits) Definitions           Safety WDL     Effective: 09/28/15    Row Information: <b>WDL Definition:</b> Bed in low position, wheels locked; call light in reach; upper side rails up x 2; ID band on<br> <font  "color=\"gray\"><i>Item=AS safety wdl>>List=AS safety wdl>>Version=F14</i></font>      Skin WDL     Effective: 09/28/15    Row Information: <b>WDL Definition:</b> Warm; dry; intact; elastic; without discoloration; pressure points without redness<br> <font color=\"gray\"><i>Item=AS skin wdl>>List=AS skin wdl>>Version=F14</i></font>      Vitals     Vital Signs Flowsheet     VITAL SIGNS     ANALGESIA SIDE EFFECTS MONITORING      Temp  99.1  F (37.3  C) 11/23/18 0905   Side Effects Monitoring: Respiratory Quality  R 11/23/18 1224    Temp src  Oral 11/23/18 0905   Side Effects Monitoring: Respiratory Depth  N 11/23/18 1224    Resp  16 11/23/18 1224   Side Effects Monitoring: Sedation Level  1 11/23/18 1224    Pulse  83 11/22/18 2354   JULIO C COMA SCALE      Heart Rate  87 11/23/18 0905   Best Eye Response  4-->(E4) spontaneous 11/22/18 1707    Pulse/Heart Rate Source  Monitor 11/23/18 0905   Best Motor Response  6-->(M6) obeys commands 11/22/18 1707    BP  160/72 11/23/18 0905   Best Verbal Response  5-->(V5) oriented 11/22/18 1707    BP Location  Right arm 11/23/18 0905   [place] Coma Scale Score  15 11/22/18 1707    Patient Position  Lying 11/19/18 1707   HEIGHT AND WEIGHT      LYING ORTHOSTATIC BP     Height  1.778 m (5' 10\") 11/19/18 0749    Lying Orthostatic BP  107/57 11/21/18 1009   Weight  81.6 kg (180 lb) 11/19/18 0749    Lying Orthostatic Pulse  72 bpm 11/21/18 1009   BSA (Calculated - sq m)  2.01 11/19/18 0749    SITTING ORTHOSTATIC BP     BMI (Calculated)  25.88 11/19/18 0749    Sitting Orthostatic BP  113/42 11/21/18 1009   EKG MONITORING      Sitting Orthostatic Pulse  65 bpm 11/21/18 1009   Cardiac Regularity  Regular 11/19/18 0808    OXYGEN THERAPY     Cardiac Rhythm  NSR 11/19/18 0808    SpO2  91 % 11/23/18 0905   POSITIONING      O2 Device  None (Room air) 11/23/18 0905   Head of Bed (HOB)  HOB at 30 degrees 11/23/18 1232    Oxygen Delivery  1 LPM 11/22/18 0045   Body Position  weight shift assistance " "provided 11/23/18 1232    RESPIRATORY MONITORING     Chair  Upright in chair 11/22/18 2220    Respiratory Monitoring (EtCO2)  35 mmHg 11/19/18 2039   Positioning/Transfer Devices  pillows;in use 11/22/18 2220    Integrated Pulmonary Index (IPI)  8-9 11/19/18 2039   DAILY CARE      PAIN/COMFORT     Activity Management  activity adjusted per tolerance;dorsiflexion, plantar flexion encouraged;up in chair 11/23/18 1232    Patient Currently in Pain  sleeping: patient not able to self report 11/23/18 1224   Activity Assistance Provided  assistance, 2 people 11/23/18 1232    Preferred Pain Scale  number (Numeric Rating Pain Scale) 11/23/18 1224   Assistive Device Utilized  gait belt;walker 11/23/18 1232    0-10 Pain Scale  6 11/23/18 1059   ECG      Word Pain Scale  4 11/23/18 1013   ECG Rhythm  Normal sinus rhythm 11/21/18 2110    Pain Location  Hip 11/23/18 1224   Ectopy  None 11/19/18 1707    Pain Orientation  Left 11/23/18 1224   Lead Monitored  Lead II 11/21/18 2110    Pain Descriptors  Aching;Constant 11/23/18 1224   STANDING ORTHOSTATIC BP      Pain Intervention(s)  Medication (See eMAR) 11/23/18 1224   Standing Orthostatic BP  117/48 (patient denies lightheadedness, but L foot felt \"shaky\") 11/21/18 1009    Response to Interventions  Absence of nonverbal indicators of pain 11/23/18 1224   Standing Orthostatic Pulse  62 bpm 11/21/18 1009            Patient Lines/Drains/Airways Status    Active LINES/DRAINS/AIRWAYS     Name: Placement date: Placement time: Site: Days: Last dressing change:    Urethral Catheter Coude 11/21/18   1538   Coude   1     Peripheral IV 11/20/18 Left Lower forearm 11/20/18   1147   Lower forearm   3     Wound 11/19/18 Left Head Laceration 11/19/18   0810   Head   4     Incision/Surgical Site 11/19/18 Left Hip 11/19/18   1618    3             Patient Lines/Drains/Airways Status    Active PICC/CVC     None            Intake/Output Detail Report     Date Intake     Output   Net    Shift P.O. " I.V. IV Piggyback Total Urine Blood Total       Noc 11/21/18 2300 - 11/22/18 0659 150 -- -- 150 350 -- 350 -200    Day 11/22/18 0700 - 11/22/18 1459 -- -- -- -- 400 -- 400 -400    Donya 11/22/18 1500 - 11/22/18 2259 -- -- -- -- -- -- -- 0    Noc 11/22/18 2300 - 11/23/18 0659 -- -- -- -- 600 -- 600 -600    Day 11/23/18 0700 - 11/23/18 1459 420 -- -- 420 -- -- -- 420      Last Void/BM       Most Recent Value    Urine Occurrence 0 at 11/21/2018 0615    Stool Occurrence 0 at 11/21/2018 0615      Case Management/Discharge Planning     Case Management/Discharge Planning Flowsheet     REFERRAL INFORMATION     COPING/STRESS      Did the Initial Social Work Assessment result in a Social Work Case?  Yes 11/22/18 1224   Major Change/Loss/Stressor  none 11/19/18 1054    Admission Type  inpatient 11/22/18 1224   EXPECTED DISCHARGE      Arrived From  home or self-care 11/22/18 1224   Expected Discharge Date  11/22/18 (to 11/23 TCU) 11/21/18 0700    Referral Source  physician 11/22/18 1224   FINAL RESOURCES      # of Referrals Placed by CTS  Post Acute Facilities 11/22/18 1224   Equipment Currently Used at Home  none 11/20/18 1129    Post Acute Facilities  TCU 11/22/18 1224   Resources List  Skilled Nursing Facility 11/23/18 1140    Reason For Consult  discharge planning 11/22/18 1224   Skilled Nursing Facility  [facility] ([facility]) [phone], Fax:[phone] 11/23/18 1140    Record Reviewed  medical record 11/22/18 1224   PAS Number  [id number] 11/23/18 1140    CTS Assigned to Case  Kyara Madsen 11/23/18 1140   ABUSE RISK SCREEN      LIVING ENVIRONMENT     QUESTION TO PATIENT:  Has a member of your family or a partner(now or in the past) intimidated, hurt, manipulated, or controlled you in any way?  no 11/19/18 0752    Lives With  spouse 11/22/18 1224   QUESTION TO PATIENT: Do you feel safe going back to the place where you are living?  yes 11/19/18 0752    Living Arrangements  apartment 11/22/18 7046   OBSERVATION: Is  there reason to believe there has been maltreatment of a vulnerable adult (ie. Physical/Sexual/Emotional abuse, self neglect, lack of adequate food, shelter, medical care, or financial exploitation)?  no 11/19/18 0752    Provides Primary Care For  no one 11/22/18 1224   OTHER      ASSESSMENT OF FAMILY/SOCIAL SUPPORT     Are you depressed or being treated for depression?  No 11/19/18 1251    Marital Status   11/22/18 1224   HOMICIDE RISK      EMPLOYMENT     Feels Like Hurting Others  no 11/19/18 0752    Do you work full or part-time?  no 11/22/18 1224

## 2018-11-19 NOTE — IP AVS SNAPSHOT
MRN:[id number]                      After Visit Summary   11/19/2018    Sourav Fine    MRN: [id number]           Thank you!     Thank you for choosing [facility] for your care. Our goal is always to provide you with excellent care. Hearing back from our patients is one way we can continue to improve our services. Please take a few minutes to complete the written survey that you may receive in the mail after you visit with us. Thank you!        Patient Information     Date Of Birth          11/15/1925        Designated Caregiver       Most Recent Value    Caregiver    Will someone help with your care after discharge? yes    Name of designated caregiver Opal    Phone number of caregiver [phone]    Caregiver address same      About your hospital stay     You were admitted on:  November 19, 2018 You last received care in the:  [place] [address] [facility]    You were discharged on:  November 23, 2018        Reason for your hospital stay       Left intertrochanteric femur fracture open reduction and internal fixation                  Who to Call     For medical emergencies, please call 911.  For non-urgent questions about your medical care, please call your primary care provider or clinic, [phone]  For questions related to your surgery, please call your surgery clinic        Attending Provider     Provider Specialty    David Denton DO Emergency Medicine    Rosendo Clemente MD Internal Medicine    [facility]       Primary Care Provider Office Phone # Fax #    Nick Finney [phone]      After Care Instructions     Activity - Up with assistive device           Additional Discharge Instructions       Routine care of Valencia catheter .  Valencia catheter to gravity.  Voiding trial in 4-5 days, if retains, replace a catheter and needs referral to urology.            Advance Diet as Tolerated       Follow this diet  upon discharge: Orders Placed This Encounter      Advance Diet as Tolerated: Regular Diet Adult            Advance Diet as Tolerated       Follow this diet upon discharge: Orders Placed This Encounter         Advance Diet as Tolerated: Regular Diet Adult            Fall precautions           General info for SNF       Length of Stay Estimate: Short Term Care: Estimated # of Days <30  Condition at Discharge: Improving  Level of care:skilled   Rehabilitation Potential: Good  Admission H&P remains valid and up-to-date: Yes  Recent Chemotherapy: N/A  Use Nursing Home Standing Orders: Yes            Mantoux instructions       Give two-step Mantoux (PPD) Per Facility Policy Yes            Weight bearing status       WBAT            Wound care       Site:   Left hip x2  Instructions:  Leave dressing intact if Aquacel and remove at POD #7-10, remove staples POD #14  Daily dressing changes with gauze and tape                  Follow-up Appointments     Follow Up and recommended labs and tests       Follow up with Dr. Wang in 2-3 weeks.  No follow up labs or test are needed. Call for appointment [phone]                  Additional Services     Occupational Therapy Adult Consult       Evaluate and treat as clinically indicated.    Reason:  Left intertrochanteric femur fracture open reduction and internal fixation            Physical Therapy Adult Consult       Evaluate and treat as clinically indicated.    Reason:  Left intertrochanteric femur fracture open reduction and internal fixation                  Pending Results     Date and Time Order Name Status Description    11/20/2018 2301 EKG 12-LEAD, TRACING ONLY Preliminary     11/20/2018 2237 EKG 12-lead, tracing only Preliminary             Statement of Approval     Ordered          11/23/18 1125  I have reviewed and agree with all the recommendations and orders detailed in this document.  EFFECTIVE NOW     Approved and electronically signed by:  Hansel Limon  "MD             Admission Information     Date & Time Provider Department Dept. Phone    2018 Mingo Wang MD [place] [address] [facility] [phone]      Your Vitals Were     Blood Pressure Pulse Temperature Respirations Height Weight    160/72 (BP Location: Right arm) 83 99.1  F (37.3  C) (Oral) 16 1.778 m (5' 10\") 81.6 kg (180 lb)    Pulse Oximetry BMI (Body Mass Index)                91% 25.83 kg/m2          [employer] Information     [employer] lets you send messages to your doctor, view your test results, renew your prescriptions, schedule appointments and more. To sign up, go to [email or web address] . Click on \"Log in\" on the left side of the screen, which will take you to the Welcome page. Then click on \"Sign up Now\" on the right side of the page.     You will be asked to enter the access code listed below, as well as some personal information. Please follow the directions to create your username and password.     Your access code is: [email or web address]  Expires: 2019 10:32 AM     Your access code will  in 90 days. If you need help or a new code, please call your [facility] clinic or [phone].        Care EveryWhere ID     This is your Care EveryWhere ID. This could be used by other organizations to access your [facility] medical records  [id number]        Equal Access to Services     [facility] AH: Hadii jurgen lirianoo Sorenetta, waaxda luqadaha, qaybta kaalmada emmanuel, stephen flores. So [facility] [phone].    ATENCIÓN: Si habla español, tiene a brown disposición servicios gratuitos de asistencia lingüística. Llame al [phone].    We comply with applicable federal civil rights laws and [place] laws. We do not discriminate on the basis of race, color, national origin, age, disability, sex, sexual orientation, or gender identity.               Review of your medicines      START taking        Dose / Directions    acetaminophen 325 MG tablet "   Commonly known as:  TYLENOL        Dose:  650 mg   Take 2 tablets (650 mg) by mouth every 6 hours as needed for mild pain   Quantity:  40 tablet   Refills:  0       senna-docusate 8.6-50 MG per tablet   Commonly known as:  SENOKOT-S;PERICOLACE        Dose:  2 tablet   Take 2 tablets by mouth 2 times daily   Quantity:  30 tablet   Refills:  0       tamsulosin 0.4 MG capsule   Commonly known as:  FLOMAX   Used for:  Urinary retention        Dose:  0.4 mg   Start taking on:  11/24/2018   Take 1 capsule (0.4 mg) by mouth daily   Quantity:  60 capsule   Refills:  0       traMADol 50 MG tablet   Commonly known as:  ULTRAM        Dose:  50 mg   Take 1 tablet (50 mg) by mouth every 6 hours as needed for moderate pain   Quantity:  30 tablet   Refills:  0         CONTINUE these medicines which may have CHANGED, or have new prescriptions. If we are uncertain of the size of tablets/capsules you have at home, strength may be listed as something that might have changed.        Dose / Directions    amLODIPine 2.5 MG tablet   Commonly known as:  NORVASC   This may have changed:    - medication strength  - how much to take   Used for:  Benign essential hypertension        Dose:  2.5 mg   Take 1 tablet (2.5 mg) by mouth daily   Refills:  0       aspirin 325 MG EC tablet   Commonly known as:  ASA   This may have changed:    - medication strength  - how much to take        Dose:  325 mg   Take 1 tablet (325 mg) by mouth daily   Quantity:  45 tablet   Refills:  0         CONTINUE these medicines which have NOT CHANGED        Dose / Directions    cyanocobalamin 1000 MCG tablet   Commonly known as:  vitamin  B-12        Dose:  1000 mcg   Take 1,000 mcg by mouth daily   Refills:  0       gabapentin 300 MG capsule   Commonly known as:  NEURONTIN        Dose:  1500 mg   Take 1,500 mg by mouth At Bedtime   Refills:  0       loperamide 2 MG capsule   Commonly known as:  IMODIUM        Dose:  2 mg   Take 2 mg by mouth 2 times daily as needed  for diarrhea   Refills:  0       PRESERVISION AREDS Tabs        Dose:  1 tablet   Take 1 tablet by mouth 2 times daily   Refills:  0            Where to get your medicines      Some of these will need a paper prescription and others can be bought over the counter. Ask your nurse if you have questions.     Bring a paper prescription for each of these medications     acetaminophen 325 MG tablet    aspirin 325 MG EC tablet    senna-docusate 8.6-50 MG per tablet    traMADol 50 MG tablet       You don't need a prescription for these medications     amLODIPine 2.5 MG tablet    tamsulosin 0.4 MG capsule                Protect others around you: Learn how to safely use, store and throw away your medicines at www.disposemymeds.org.        Information about OPIOIDS     PRESCRIPTION OPIOIDS: WHAT YOU NEED TO KNOW   We gave you an opioid (narcotic) pain medicine. It is important to manage your pain, but opioids are not always the best choice. You should first try all the other options your care team gave you. Take this medicine for as short a time (and as few doses) as possible.    Some activities can increase your pain, such as bandage changes or therapy sessions. It may help to take your pain medicine 30 to 60 minutes before these activities. Reduce your stress by getting enough sleep, working on hobbies you enjoy and practicing relaxation or meditation. Talk to your care team about ways to manage your pain beyond prescription opioids.    These medicines have risks:    DO NOT drive when on new or higher doses of pain medicine. These medicines can affect your alertness and reaction times, and you could be arrested for driving under the influence (DUI). If you need to use opioids long-term, talk to your care team about driving.    DO NOT operate heavy machinery    DO NOT do any other dangerous activities while taking these medicines.    DO NOT drink any alcohol while taking these medicines.     If the opioid prescribed includes  acetaminophen, DO NOT take with any other medicines that contain acetaminophen. Read all labels carefully. Look for the word  acetaminophen  or  Tylenol.  Ask your pharmacist if you have questions or are unsure.    You can get addicted to pain medicines, especially if you have a history of addiction (chemical, alcohol or substance dependence). Talk to your care team about ways to reduce this risk.    All opioids tend to cause constipation. Drink plenty of water and eat foods that have a lot of fiber, such as fruits, vegetables, prune juice, apple juice and high-fiber cereal. Take a laxative (Miralax, milk of magnesia, Colace, Senna) if you don t move your bowels at least every other day. Other side effects include upset stomach, sleepiness, dizziness, throwing up, tolerance (needing more of the medicine to have the same effect), physical dependence and slowed breathing.    Store your pills in a secure place, locked if possible. We will not replace any lost or stolen medicine. If you don t finish your medicine, please throw away (dispose) as directed by your pharmacist. The [place] Pollution Control Agency has more information about safe disposal: https://www.pca.[email or web address].mn.us/living-green/managing-unwanted-medications             Medication List: This is a list of all your medications and when to take them. Check marks below indicate your daily home schedule. Keep this list as a reference.      Medications           Morning Afternoon Evening Bedtime As Needed    acetaminophen 325 MG tablet   Commonly known as:  TYLENOL   Take 2 tablets (650 mg) by mouth every 6 hours as needed for mild pain   Last time this was given:  650 mg on 11/23/2018  8:23 AM                                amLODIPine 2.5 MG tablet   Commonly known as:  NORVASC   Take 1 tablet (2.5 mg) by mouth daily                                aspirin 325 MG EC tablet   Commonly known as:  ASA   Take 1 tablet (325 mg) by mouth daily                                 cyanocobalamin 1000 MCG tablet   Commonly known as:  vitamin  B-12   Take 1,000 mcg by mouth daily                                gabapentin 300 MG capsule   Commonly known as:  NEURONTIN   Take 1,500 mg by mouth At Bedtime   Last time this was given:  1,500 mg on 11/22/2018  9:11 PM                                loperamide 2 MG capsule   Commonly known as:  IMODIUM   Take 2 mg by mouth 2 times daily as needed for diarrhea                                PRESERVISION AREDS Tabs   Take 1 tablet by mouth 2 times daily                                senna-docusate 8.6-50 MG per tablet   Commonly known as:  SENOKOT-S;PERICOLACE   Take 2 tablets by mouth 2 times daily   Last time this was given:  2 tablets on 11/23/2018  8:23 AM                                tamsulosin 0.4 MG capsule   Commonly known as:  FLOMAX   Take 1 capsule (0.4 mg) by mouth daily   Start taking on:  11/24/2018   Last time this was given:  0.4 mg on 11/23/2018  8:23 AM                                traMADol 50 MG tablet   Commonly known as:  ULTRAM   Take 1 tablet (50 mg) by mouth every 6 hours as needed for moderate pain   Last time this was given:  50 mg on 11/23/2018 10:58 AM

## 2018-11-19 NOTE — H&P
[facility]    History and Physical  Hospitalist       Date of Admission:  11/19/2018  Date of Service (when I saw the patient): 11/19/18    Assessment & Plan   Sourav Fine is a 89 year old male with PMHx of HTN, CVA, CKD IV, ILIR utilizing CPAP, peripheral neuropathy, and macular degeneration who presented to the ED on 11/19 with syncope and collapse, found to have a minimally displaced intertrochanteric fracture of the hip. Pt slightly hypertensive, remainder of vitals WNL. Pt currently stable.     Syncope and collapse: Syncopal event this morning upon awakening, pt went to the bathroom and while closing the door, felt dizzy and had subsequent syncopal event. No chest pain or palpitations. Prior dizziness two weeks ago with reintroduction of Norvasc 5 mg po every day on pt's medication regimen. He took two days worth of medication, but stopped this on his own due to dizziness. Does not use a walker or cane for ambulatory assistance. No focal neuro deficits. No headaches or vision changes (note hx of macular degeneration). Sodium 145, chloride 113, creatinine 2.15 with GFR 29. CBC with Hgb 12.8. CXR with left lateral rib fracture of uncertain chronicity. XR Pelvis with minimally displaced intertrochanteric fracture of the left hip. Head CT unremarkable for acute pathology, but does show known old left frontal infarct. EKG with NSR, no ST depression, elevation, or T wave abnormalities; bifascicular block per read.   -- IVF at D5 0.45 NS at 100 ccs/hr   -- Monitor on telemetry   -- Echocardiogram   -- Unable to obtain orthostatic blood pressures due to hip fracture below     Intertrochanteric hip fracture, left, minimally displaced, secondary to above: Per imaging above  -- Ortho aware, plan for OR this evening pending pre-operative assessment  -- Defer routine post-operative cares, IVF, DVT prophylaxis and pain control to Ortho   -- Delirium prevention protocol in place given age   -- Bowel regimen  in place while on narcotics   -- Encourage pulmonary toilet; incentive spirometer at bedside   -- PT and OT in the AM   -- CBC and BMP in AM     Pre-operative assessment: denies active chest pain, SOB, palpitations, nausea, vomiting, or recent illness. Has tolerated prior procedures without post-operative complication. No family or personal history of anesthesia intolerance, bleeding coagulopathy. No family hx of sudden death following surgery. No hx of CAD, CHF, or DM, pt is with hx of CVA and has a creatinine >2; estimated risk of adverse outcome with non-cardiac surgery is moderate risk.   -- Complete syncope evaluation above with echocardiogram this AM, pending no overt abnormalities, pt ok to proceed with above planned surgery without further medical optimization.     HTN: Previously on Norvasc 5 mg po every day; restarted by PCP about two weeks ago, pt took medication for two days, but stopped due to dizziness. Not currently on regimen.   -- Monitor  -- PRN Hydralazine available for SBP >180    ILIR: Pt declines CPAP at this time, available PRN     CKD IV  History of right radical nephrectomy: Baseline creatinine 2.1-2.2. Creatinine on admission 2.15.   -- Monitor     CVA: Left frontal lobe infarction in 5/2018. Head CT on admission showing an old left frontal infarct. No residual deficits.     Peripheral neuropathy   Vitamin B12 deficiency: Resume B12 and gabapentin once verified by pharmacy     Chronic diarrhea: Seen by GI in the outpatient setting. Hold antidiarrheal medication while on narcotics     DVT Prophylaxis: Pneumatic Compression Devices  Code Status: DNR / DNI    Disposition: Expected discharge pending clinical course     Jes Durham PA-C    This patient was discussed with Dr. Clemente of the Hospitalist Service who agrees with current plans as outlined above.     Primary Care Physician   Nick Finney    Chief Complaint   Syncope and collapse  Left hip pain    History is  obtained from the patient and wife, Opal, who accompanies patient at bedside.     History of Present Illness   Sourav Fine is a 89 year old male with PMHx of HTN, CVA, CKD IV, ILIR utilizing CPAP, peripheral neuropathy, and macular degeneration who presented to the ED on 11/19 with syncope and collapse, found to have a minimally displaced intertrochanteric fracture of the hip. Pt slightly hypertensive, remainder of vitals WNL. Pt currently stable.     Describes syncopal event this morning upon awakening, pt went to the bathroom and while closing the door, felt dizzy and had subsequent syncopal event. No chest pain or palpitations. Prior dizziness two weeks ago with reintroduction of Norvasc 5 mg po every day on pt's medication regimen. He took two days worth of medication, but stopped this on his own due to dizziness. Does not use a walker or cane for ambulatory assistance. No focal neuro deficits. No headaches or vision changes (note hx of macular degeneration).     In the ED, the patient was seen and assessed by Dr. Denton who obtained basic labs and imaging which revealed a sodium 145, chloride 113, creatinine 2.15 with GFR 29. CBC with Hgb 12.8. CXR with left lateral rib fracture of uncertain chronicity. XR Pelvis with minimally displaced intertrochanteric fracture of the left hip. Head CT unremarkable for acute pathology, but does show known old left frontal infarct. EKG with NSR, no ST depression, elevation, or T wave abnormalities; bifascicular block per read. Pt was given a 1 L bolus of IVF in the ED, morphine 2 mg and Ortho was made aware of pt. Assessed him in the ED.     On my interview, pt is resting comfortably in bed. No acute distress. Pain well managed without movement. Confirms above history. Pt denies active chest pain, SOB, palpitations, nausea, vomiting, or recent illness. Has tolerated prior procedures without post-operative complication. No family or personal history of anesthesia  intolerance, bleeding coagulopathy. No family hx of sudden death following surgery. No hx of CAD, CHF, or DM, pt is with hx of CVA and has a creatinine >2; estimated risk of adverse outcome with non-cardiac surgery is moderate risk.     Past Medical History    1. HTN   2. ILIR utilizing CPAP at home  3. CKD IV; baseline creatinine 2.1-2.2.   4. History of right radical nephrectomy   5. CVA, Left frontal lobe infarction in 5/2018. No residual deficits   6. Peripheral neuropathy   7. Vitamin B12 deficiency:   8. Chronic diarrhea:     Past Surgical History   1. Hernia repair   2. Transurethral needle ablation   3. Right radical nephrectomy   4. [facility] brachytherapy seed palladium   5. Colonoscopy     Prior to Admission Medications   Prior to Admission Medications   Prescriptions Last Dose Informant Patient Reported? Taking?   Multiple Vitamins-Minerals (PRESERVISION AREDS) TABS   Yes Yes   Sig: Take 2 tablets by mouth   amLODIPine (NORVASC) 5 MG tablet   Yes Yes   Sig: Take 5 mg by mouth   aspirin 81 MG EC tablet   Yes Yes   Sig: Take 81 mg by mouth   colestipol (COLESTID) 1 g tablet   Yes Yes   Sig: Take 1 g by mouth   cyanocobalamin (VITAMIN  B-12) 1000 MCG tablet   Yes Yes   Sig: Take 1,000 mcg by mouth   gabapentin (NEURONTIN) 300 MG capsule   Yes Yes   Sig: Take 1,500 mg by mouth      Facility-Administered Medications: None     Allergies   No Known Allergies    Social History   Prior tobacco use via pipe for 30 years, quit in 1989. Has maximum two glasses per day. No illicit drug use. Recently moved from [place], lives in [facility]. No walker or cane for ambulatory assistance.     Family History   Mother with CAD?     No family history of sudden death following surgery, bleeding coagulopathies, or intolerance to anesthesia.     Review of Systems   The 10 point Review of Systems is negative other than noted in the HPI.    Physical Exam   Temp: 98  F (36.7  C) Temp src: Oral BP: 125/80   Heart Rate: 65 Resp:  16 SpO2: 96 % O2 Device: None (Room air)    Vital Signs with Ranges  Temp:  [98  F (36.7  C)] 98  F (36.7  C)  Heart Rate:  [59-65] 65  Resp:  [11-16] 16  BP: (125-169)/(72-82) 125/80  SpO2:  [95 %-97 %] 96 %  180 lbs 0 oz    CONSTITUTIONAL: Pt laying in bed, dressed in hospital garb. Appears comfortable. Cooperative with interview. Accompanied by wife, Opal, at bedside.  HEENT: Normocephalic. Lac noted on left posterior scalp, bleeding controlled. Negative for conjunctival redness or scleral icterus.    CARDIOVASCULAR: RRR, no murmurs, rubs, or extra heart sounds appreciated. Pulses +2/4 and regular in upper and lower extremities, bilaterally.   RESPIRATORY: No increased work of breathing.  CTA, bilat; no wheezes, rales, or rhonchi appreciated.  GASTROINTESTINAL:  Abdomen soft, non-distended. BS auscultated in all four quadrants. Negative for tenderness to palpation.  No masses or organomegaly noted.  MUSCULOSKELETAL: Did not test strength in left LE due to pain. No gross deformities noted. Normal muscle tone.   HEMATOLOGIC/LYMPHATIC/IMMUNOLOGIC: No anterior or posterior cervical LAD, bilaterally. Negative for lower extremity edema, bilaterally.  NEUROLOGIC: Alert and oriented to person, place, and time.  strength intact. No focal neuro deficits   SKIN: Scalp lac as above     Data   Data reviewed today:  EKG with NSR, no overt ST depression, elevation or T wave abnormalities. Do note comment of bifascicular block. Remainder of work-up reviewed and below.     Recent Labs  Lab 11/19/18  0746   WBC 9.5   HGB 12.8*   MCV 97      *   POTASSIUM 4.6   CHLORIDE 113*   CO2 26   BUN 41*   CR 2.15*   ANIONGAP 6   AGNIESZKA 8.0*   *       Recent Results (from the past 24 hour(s))   XR Chest 1 View    Narrative    CHEST ONE VIEW  11/19/2018 8:35 AM     HISTORY:  Trauma to left hip.     COMPARISON: 1/28/2006      Impression    IMPRESSION: Heart size upper normal but similar to prior. Pulmonary  vascularity  within normal limits. The lungs are clear. No pneumothorax  or pleural effusion. There are lower left lateral rib fractures of  uncertain chronicity.    MALLORY BEAL MD   XR Pelvis w Hip Left 1 View    Narrative    PELVIS WITH LEFT HIP LATERAL TWO VIEWS  11/19/2018 8:35 AM     HISTORY: Left hip pain.     COMPARISON: None.      Impression    IMPRESSION:  Minimally displaced intertrochanteric fracture of the  left hip. There are degenerative changes of both hips, left worse than  right. Prostate seeds noted.    MALLORY BEAL MD   Head CT w/o contrast    Narrative    CT SCAN OF THE HEAD WITHOUT CONTRAST   11/19/2018 8:48 AM     HISTORY:  Head pain, fall.     TECHNIQUE:  Axial images of the head and coronal reformations without  IV contrast material. Radiation dose for this scan was reduced using  automated exposure control, adjustment of the mA and/or kV according  to patient size, or iterative reconstruction technique.    COMPARISON: None.    FINDINGS:  Mild volume loss is present. Geographic hypoattenuation and  volume loss within the left middle/inferior frontal gyrus is present  consistent with old infarct. No evidence of acute ischemia,  hemorrhage, mass, mass effect, or hydrocephalus. The visualized  calvarium, skull base, paranasal sinuses, and extracranial soft  tissues are unremarkable. Bilateral lens replacements are present.      Impression    IMPRESSION:  No acute intracranial abnormality. 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    PADDY LANDA MD

## 2018-11-19 NOTE — ANESTHESIA CARE TRANSFER NOTE
Patient: Sourav Fine    Procedure(s):  OPEN REDUCTION INTERNAL FIXATION LEFT HIP NAILING.    Diagnosis: INTERTROCHANTERIC HIP FRACTURE.  Diagnosis Additional Information: No value filed.    Anesthesia Type:   General, ETT     Note:  Airway :Face Mask  Patient transferred to:PACU  Comments: Spontaneously breathing with adequate vT, sats 98 - 100%, TOF 4/4 with sustained tetany. Purposeful movement, following commands with 100% O2 at 15 L/min, suctioned x2, Anesthesiologist present.  Extubated.  To PACU with O2 via SFM at 10 L/minute, VSS. Monitors on, report to RN.Handoff Report: Identifed the Patient, Identified the Reponsible Provider, Reviewed the pertinent medical history, Discussed the surgical course, Reviewed Intra-OP anesthesia mangement and issues during anesthesia, Set expectations for post-procedure period and Allowed opportunity for questions and acknowledgement of understanding      Vitals: (Last set prior to Anesthesia Care Transfer)    CRNA VITALS  11/19/2018 1631 - 11/19/2018 1701      11/19/2018             Resp Rate (set): 10                Electronically Signed By: JUAN PABLO Elkins CRNA  November 19, 2018  5:01 PM

## 2018-11-19 NOTE — IP AVS SNAPSHOT
` [place] [id number] [facility]: [phone]            Medication Administration Report for Sourav Fine as of 11/23/18 1415   Legend:    Given Hold Not Given Due Canceled Entry Other Actions    Time Time (Time) Time  Time-Action       Inactive    Active    Linked        Medications 11/17/18 11/18/18 11/19/18 11/20/18 11/21/18 11/22/18 11/23/18    acetaminophen (TYLENOL) tablet 650 mg  Dose: 650 mg  Freq: EVERY 4 HOURS PRN Route: PO  PRN Reason: other  PRN Comment: multimodal surgical pain management along with NSAIDS and opioid medication as indicated based on pain control and physical function.  Start: 11/22/18 1800   Admin Instructions: May give first dose 4 hours after last scheduled dose of acetaminophen.  Maximum acetaminophen dose from all sources = 75 mg/kg/day not to exceed 4 grams/day.    Admin. Amount: 2 tablet (2 × 325 mg tablet)  Last Admin: 11/23/18 0823  Dispense Loc: [facility]  POC: Post-procedure          2251 (650 mg)-Given        0823 (650 mg)-Given           benzocaine-menthol (CHLORASEPTIC) 6-10 MG lozenge 1-2 lozenge  Dose: 1-2 lozenge  Freq: EVERY 1 HOUR PRN Route: BU  PRN Reason: sore throat  PRN Comment: sore throat without fever  Start: 11/19/18 1848   Admin. Amount: 1-2 lozenge  Dispense Loc:  [facility]  POC: Post-procedure               enoxaparin (LOVENOX) injection 30 mg  Dose: 30 mg  Freq: EVERY 24 HOURS Route: SC  Start: 11/20/18 1501   Admin Instructions: Check to make sure start date/time is 12-24 hours post op unless documented complication, AND no sooner than 22 hours post op if spinal anesthesia used.  Continue until discharge to home. HOLD if platelet count falls below 50% of baseline or less than 100,000/ L and notify provider.    Admin. Amount: 30 mg = 0.3 mL Conc: 30 mg/0.3 mL  Last Admin: 11/23/18 1403  Dispense Loc:  [facility]  Volume: 0.3 mL  POC: Post-procedure        1415 (30 mg)-Given        1418 (30 mg)-Given        1427 (30 mg)-Given         "1403 (30 mg)-Given                  gabapentin (NEURONTIN) capsule 1,500 mg  Dose: 1,500 mg  Freq: AT BEDTIME Route: PO  Start: 11/19/18 2200   Admin. Amount: 1 capsule (1 × 300 mg capsule) + 3 capsule (3 × 400 mg capsule)  Last Admin: 11/22/18 2111  Dispense Loc: [facility]   Mixture Administration Information:   Medication Type Amount   gabapentin 300 MG CAPS Medications 300 mg   gabapentin 400 MG CAPS Medications 1,200 mg               2135 (1,500 mg)-Given        2203 (1,500 mg)-Given        2146 (1,500 mg)-Given        2111 (1,500 mg)-Given        [ ] 2200           HYDROmorphone (PF) (DILAUDID) injection 0.2 mg  Dose: 0.2 mg  Freq: EVERY 2 HOURS PRN Route: IV  PRN Reason: other  PRN Comment: pain control or improvement in physical function.  Hold dose for analgesic side effects.  Start: 11/19/18 1848   Admin Instructions: Notify provider to assess for uncontrolled pain or analgesic side effects. Hold while on IV PCA or with regular IV opioid dosing.  For ordered IV doses 0.1-4 mg give IV Push undiluted. Administer each 2mg over 2-5 minutes.    Admin. Amount: 0.2 mg  Dispense Loc:  [facility]  POC: Post-procedure               lidocaine (LMX4) cream  Freq: EVERY 1 HOUR PRN Route: Top  PRN Reason: pain  PRN Comment: with VAD insertion or accessing implanted port.  Start: 11/19/18 1848   Admin Instructions: Do NOT give if patient has a history of allergy to any local anesthetic or any \"jan\" product.   Apply 30 minutes prior to VAD insertion or port access.  MAX Dose:  2.5 g (  of 5 g tube)    Dispense Loc: Contact Rx for dose  POC: Post-procedure               lidocaine 1 % 1 mL  Dose: 1 mL  Freq: EVERY 1 HOUR PRN Route: OTHER  PRN Comment: mild pain with VAD insertion or accessing implanted port  Start: 11/19/18 1848   Admin Instructions: Do NOT give if patient has a history of allergy to any local anesthetic or any \"jan\" product. MAX dose 1 mL subcutaneous OR intradermal in divided doses.    Admin. Amount: 1 " mL  Dispense Loc:  [facility]  Volume: 20 mL  POC: Post-procedure               naloxone (NARCAN) injection 0.1-0.4 mg  Dose: 0.1-0.4 mg  Freq: EVERY 2 MIN PRN Route: IV  PRN Reason: opioid reversal  Start: 11/19/18 1848   Admin Instructions: For respiratory rate LESS than or EQUAL to 8.  Partial reversal dose:  0.1 mg titrated q 2 minutes for Analgesia Side Effects Monitoring Sedation Level of 3 (frequently drowsy, arousable, drifts to sleep during conversation).Full reversal dose:  0.4 mg bolus for Analgesia Side Effects Monitoring Sedation Level of 4 (somnolent, minimal or no response to stimulation).  For ordered IV doses 0.1-2mg give IVP. Give each 0.4mg over 15 seconds in emergency situations. For non-emergent situations further dilute in 9mL of NS to facilitate titration of response.    Admin. Amount: 0.1-0.4 mg = 0.25-1 mL Conc: 0.4 mg/mL  Dispense Loc:  [facility]  Volume: 1 mL  POC: Post-procedure               ondansetron (ZOFRAN-ODT) ODT tab 4 mg  Dose: 4 mg  Freq: EVERY 6 HOURS PRN Route: PO  PRN Reasons: nausea,vomiting  Start: 11/19/18 1848   Admin Instructions: This is Step 1 of nausea and vomiting management.  If nausea not resolved in 15 minutes, go to Step 2 prochlorperazine (COMPAZINE). Do not push through foil backing. Peel back foil and gently remove. Place on tongue immediately. Administration with liquid unnecessary  With dry hands, peel back foil backing and gently remove tablet; do not push oral disintegrating tablet through foil backing; administer immediately on tongue and oral disintegrating tablet dissolves in seconds; then swallow with saliva; liquid not required.    Admin. Amount: 1 tablet (1 × 4 mg tablet)  Last Admin: 11/23/18 1403  Dispense Loc:  [facility]  POC: Post-procedure           1403 (4 mg)-Given          Or  ondansetron (ZOFRAN) injection 4 mg  Dose: 4 mg  Freq: EVERY 6 HOURS PRN Route: IV  PRN Reasons: nausea,vomiting  Start: 11/19/18 1848   Admin Instructions: This is Step  1 of nausea and vomiting management.  If nausea not resolved in 15 minutes, go to Step 2 prochlorperazine (COMPAZINE).  Irritant. For ordered IV doses 0.1-4 mg, give IV Push undiluted over 2-5 minutes.    Admin. Amount: 4 mg = 2 mL Conc: 4 mg/2 mL  Dispense Loc:  [facility]  Infused Over: 2-5 Minutes  Volume: 2 mL  POC: Post-procedure                      oxyCODONE IR (ROXICODONE) tablet 5 mg  Dose: 5 mg  Freq: EVERY 3 HOURS PRN Route: PO  PRN Reason: other  PRN Comment: pain control or improvement in physical function. Hold dose for analgesic side effects.  Start: 11/19/18 1848   Admin Instructions: Notify provider to assess for uncontrolled pain or analgesic side effects. Hold while on PCA or with regular IV opioid dosing.  Maximum total  is 40 mg in 24 hours.    Admin. Amount: 1 tablet (1 × 5 mg tablet)  Last Admin: 11/21/18 0010  Dispense Loc:  [facility]  POC: Post-procedure        1003 (5 mg)-Given        0010 (5 mg)-Given             prochlorperazine (COMPAZINE) injection 5 mg  Dose: 5 mg  Freq: EVERY 6 HOURS PRN Route: IV  PRN Reasons: nausea,vomiting  Start: 11/19/18 1848   Admin Instructions: This is Step 2 of nausea and vomiting management.   If nausea not resolved in 15 minutes, give metoclopramide (REGLAN) if ordered (step 3 of nausea and vomiting management)  For ordered IV doses 0.1-10 mg, give IV Push undiluted. Each 5mg over 1 minute.    Admin. Amount: 5 mg = 1 mL Conc: 5 mg/mL  Dispense Loc:  [facility]  Infused Over: 1-2 Minutes  Volume: 1 mL  POC: Post-procedure              Or  prochlorperazine (COMPAZINE) tablet 5 mg  Dose: 5 mg  Freq: EVERY 6 HOURS PRN Route: PO  PRN Reasons: nausea,vomiting  Start: 11/19/18 1848   Admin Instructions: This is Step 2 of nausea and vomiting management.   If nausea not resolved in 15 minutes, give metoclopramide (REGLAN) if ordered (step 3 of nausea and vomiting management)    Admin. Amount: 1 tablet (1 × 5 mg tablet)  Dispense Loc:  [facility]  POC: Post-procedure                ranitidine (ZANTAC) tablet 150 mg  Dose: 150 mg  Freq: AT BEDTIME Route: PO  Start: 11/19/18 2200   Admin Instructions: Renally adjusted for crcl 10-29 ml/min    Admin. Amount: 1 tablet (1 × 150 mg tablet)  Last Admin: 11/22/18 2112  Dispense Loc: [facility]  POC: Post-procedure       2136 (150 mg)-Given        2203 (150 mg)-Given        2146 (150 mg)-Given        2112 (150 mg)-Given        [ ] 2200           senna-docusate (SENOKOT-S;PERICOLACE) 8.6-50 MG per tablet 1 tablet  Dose: 1 tablet  Freq: 2 TIMES DAILY Route: PO  Start: 11/19/18 1848   Admin Instructions: If no bowel movement in 24 hours, increase to 2 tablets PO.  Hold for loose stools.    Admin. Amount: 1 tablet  Last Admin: 11/19/18 2022  Dispense Loc: [place] [id number]  POC: Post-procedure       2022 (1 tablet)-Given                       (0836)-Not Given [C]                                     [ ] 2100          Or  senna-docusate (SENOKOT-S;PERICOLACE) 8.6-50 MG per tablet 2 tablet  Dose: 2 tablet  Freq: 2 TIMES DAILY Route: PO  Start: 11/19/18 1848   Admin Instructions: Hold for loose stools.    Admin. Amount: 2 tablet  Last Admin: 11/23/18 0823  Dispense Loc: [facility]  POC: Post-procedure               0840 (2 tablet)-Given       2204 (2 tablet)-Given               2146 (2 tablet)-Given        0843 (2 tablet)-Given       2111 (2 tablet)-Given        0823 (2 tablet)-Given       [ ] 2100           sodium chloride (PF) 0.9% PF flush 3 mL  Dose: 3 mL  Freq: EVERY 8 HOURS Route: IK  Start: 11/19/18 1900   Admin Instructions: And Q1H PRN, to lock peripheral IV dormant line.    Admin. Amount: 3 mL  Last Admin: 11/23/18 0825  Dispense Loc: [facility]  Volume: 3 mL  POC: Post-procedure       (2023)-Not Given        (0230)-Not Given       (1004)-Not Given       (2223)-Not Given        0537 (3 mL)-Given       1027 (3 mL)-Given       1651 (3 mL)-Given               0622 (3 mL)-Given       1430 (3 mL)-Given       2112 (3 mL)-Given       2354 (3  mL)-Given        0825 (3 mL)-Given       [ ] 1600           sodium chloride (PF) 0.9% PF flush 3 mL  Dose: 3 mL  Freq: EVERY 1 HOUR PRN Route: IK  PRN Reason: line flush  PRN Comment: for peripheral IV flush post IV meds  Start: 18 1848   Admin. Amount: 3 mL  Dispense Loc: [facility]  Volume: 3 mL  POC: Post-procedure               tamsulosin (FLOMAX) capsule 0.4 mg  Dose: 0.4 mg  Freq: DAILY Route: PO  Start: 18 1600   Admin Instructions: Administer 30 minutes after the same meal each day.  Capsules should be swallowed whole; do not crush chew or open.    Admin. Amount: 1 capsule (1 × 0.4 mg capsule)  Last Admin: 18 08  Dispense Loc: [facility]         1651 (0.4 mg)-Given        0843 (0.4 mg)-Given        0823 (0.4 mg)-Given           traMADol (ULTRAM) tablet 50 mg  Dose: 50 mg  Freq: EVERY 4 HOURS PRN Route: PO  PRN Reason: moderate pain  Start: 18 1056   Admin. Amount: 1 tablet (1 × 50 mg tablet)  Last Admin: 18 105  Dispense Loc: [facility]           1058 (50 mg)-Given           zolpidem (AMBIEN) half-tab 2.5 mg  Dose: 2.5 mg  Freq: AT BEDTIME PRN Route: PO  PRN Reason: sleep  Start: 18   Admin Instructions: POD 1.  Do not give unless at least 6 hours of uninterrupted sleep is expected. Adjusted per age.    Admin. Amount: 1 half-tab (1 × 2.5 mg half-tab)  Dispense Loc:  [facility]  POC: Post-procedure              Completed Medications  Medications 18         Dose: 975 mg  Freq: EVERY 8 HOURS Route: PO  Start: 180   End: 18 1427   Admin Instructions: Do not use if patient has an active opioid/acetaminophen combined analgesic product ordered for pain.  Maximum acetaminophen dose from all sources = 75 mg/kg/day not to exceed 4 grams/day.    Admin. Amount: 3 tablet (3 × 325 mg tablet)  Last Admin: 18 1427  Dispense Loc:  [facility]  Administrations Remainin  POC: Post-procedure        2135 (975 mg)-Given        0613 (975 mg)-Given       1415 (975 mg)-Given       2204 (975 mg)-Given        0540 (975 mg)-Given       1417 (975 mg)-Given       2146 (975 mg)-Given        0622 (975 mg)-Given       1427 (975 mg)-Given              Dose: 40 mg  Freq: ONCE Route: IV  Start: 18   End: 18   Admin Instructions: For ordered IV doses 1-40 mg, give IV Push undiluted over 1-2 minutes.    Admin. Amount: 40 mg = 4 mL Conc: 10 mg/mL  Last Admin: 18  Dispense Loc:  [facility]  Infused Over: 1-2 Minutes  Administrations Remainin  Volume: 4 mL         0135 (40 mg)-Given               Dose: 10 mL  Freq: ONCE Route: UR  Start: 18   End: 18   Admin. Amount: 10 mL  Last Admin: 18  Dispense Loc:  [facility]  Administrations Remainin  Volume: 10 mL        2300 (10 mL)-Given             Discontinued Medications  Medications 18         Dose: 4 mg  Freq: EVERY 6 HOURS Route: IV  Start: 18   End: 18   Admin Instructions: Irritant. For ordered IV doses 0.1-4 mg, give IV Push undiluted over 2-5 minutes.    Admin. Amount: 4 mg = 2 mL Conc: 4 mg/2 mL  Last Admin: 18  Dispense Loc:  [facility]  Infused Over: 2-5 Minutes  Administrations Remainin  Volume: 2 mL  POC: Post-procedure       2135 (4 mg)-Given        0306 (4 mg)-Given       1003 (4 mg)-Given       -Med Discontinued  ()-Not Given                Rate: 100 mL/hr   Freq: CONTINUOUS Route: IV  Start: 18   End: 18   Last Admin: 18  Dispense Loc: [facility] Floor Stock  Volume: 1,000 mL        1821 ( )-New Bag       2237-Med Discontinued            Rate: 100 mL/hr   Freq: CONTINUOUS Route: IV  Start: 18 1900   End: 18 1534   Admin Instructions: Change to saline lock when PO well tolerated.    Last Admin: 18 0840  Dispense Loc: [facility] Floor Stock  Volume: 1,000  mL  POC: Post-procedure       2023 ( )-New Bag        0840 ( )-New Bag       1534-Med Discontinued

## 2018-11-19 NOTE — IP AVS SNAPSHOT
"          [place] [address] [facility]: [phone]                                              INTERAGENCY TRANSFER FORM - LAB / IMAGING / EKG / EMG RESULTS   2018                    Hospital Admission Date: 2018  NOEMI MORALES   : 11/15/1925  Sex: Male        Attending Provider: Mingo Wang MD     Allergies:  No Known Allergies    Infection:  None   Service:  HOSPITALIST    Ht:  1.778 m (5' 10\")   Wt:  81.6 kg (180 lb)   Admission Wt:  81.6 kg (180 lb)    BMI:  25.83 kg/m 2   BSA:  2.01 m 2            Patient PCP Information     Provider PCP Type    Nick Finney MD General         Lab Results - 3 Days      Creatinine [[id number]] (Abnormal)  Resulted: 18, Result status: Final result    Ordering provider: Mingo Wang MD  18 0000 Resulting lab: [facility]    Specimen Information    Type Source Collected On   Blood  18 0551          Components       Value Reference Range Flag Lab   Creatinine 2.20 0.66 - 1.25 mg/dL H FrStHsLb   GFR Estimate 28 >60 mL/min/1.7m2 L FrStHsLb   Comment:  Non  GFR Calc   GFR Estimate If Black 34 >60 mL/min/1.7m2 L FrStHsLb   Comment:   GFR Calc            Basic metabolic panel [[id number]] (Abnormal)  Resulted: 18 0737, Result status: Final result    Ordering provider: Brett Bryant MD  18 0000 Resulting lab: [facility]    Specimen Information    Type Source Collected On   Blood  18 0652          Components       Value Reference Range Flag Lab   Sodium 143 133 - 144 mmol/L  FrStHsLb   Potassium 4.5 3.4 - 5.3 mmol/L  FrStHsLb   Chloride 114 94 - 109 mmol/L H FrStHsLb   Carbon Dioxide 24 20 - 32 mmol/L  FrStHsLb   Anion Gap 5 3 - 14 mmol/L  FrStHsLb   Glucose 115 70 - 99 mg/dL H FrStHsLb   Urea Nitrogen 44 7 - 30 mg/dL H FrStHsLb   Creatinine 2.21 0.66 - 1.25 mg/dL H FrStHsLb   GFR Estimate 28 >60 mL/min/1.7m2 L FrStHsLb "   Comment:  Non  GFR Calc   GFR Estimate If Black 34 >60 mL/min/1.7m2 L FrStHsLb   Comment:  African American GFR Calc   Calcium 8.0 8.5 - 10.1 mg/dL L FrStHsLb            Platelet count [[id number]]  Resulted: 11/22/18 0717, Result status: Final result    Ordering provider: Mingo Wang MD  11/22/18 0000 Resulting lab: [facility]    Specimen Information    Type Source Collected On   Blood  11/22/18 0652          Components       Value Reference Range Flag Lab   Platelet Count 172 150 - 450 10e9/L  FrStHsLb            Hemoglobin [[id number]] (Abnormal)  Resulted: 11/22/18 0717, Result status: Final result    Ordering provider: Brett Bryant MD  11/22/18 0000 Resulting lab: [facility]    Specimen Information    Type Source Collected On   Blood  11/22/18 0652          Components       Value Reference Range Flag Lab   Hemoglobin 10.3 13.3 - 17.7 g/dL L FrStHsLb            Basic metabolic panel [[id number]] (Abnormal)  Resulted: 11/21/18 0805, Result status: Final result    Ordering provider: Brett Bryant MD  11/21/18 0000 Resulting lab: [facility]    Specimen Information    Type Source Collected On   Blood  11/21/18 0736          Components       Value Reference Range Flag Lab   Sodium 144 133 - 144 mmol/L  FrStHsLb   Potassium 4.5 3.4 - 5.3 mmol/L  FrStHsLb   Chloride 114 94 - 109 mmol/L H FrStHsLb   Carbon Dioxide 23 20 - 32 mmol/L  FrStHsLb   Anion Gap 7 3 - 14 mmol/L  FrStHsLb   Glucose 121 70 - 99 mg/dL H FrStHsLb   Urea Nitrogen 44 7 - 30 mg/dL H FrStHsLb   Creatinine 2.38 0.66 - 1.25 mg/dL H FrStHsLb   GFR Estimate 26 >60 mL/min/1.7m2 L FrStHsLb   Comment:  Non  GFR Calc   GFR Estimate If Black 31 >60 mL/min/1.7m2 L FrStHsLb   Comment:  African American GFR Calc   Calcium 7.2 8.5 - 10.1 mg/dL L FrStHsLb            Hemoglobin [[id number]] (Abnormal)  Resulted: 11/21/18 0748, Result status: Final result    Ordering  provider: Mingo Wang MD  11/21/18 0000 Resulting lab: [facility]    Specimen Information    Type Source Collected On   Blood  11/21/18 0736          Components       Value Reference Range Flag Lab   Hemoglobin 10.4 13.3 - 17.7 g/dL L FrStHsLb            Creatinine [[id number]] (Abnormal)  Resulted: 11/20/18 0705, Result status: Final result    Ordering provider: Mingo Wang MD  11/20/18 0000 Resulting lab: [facility]    Specimen Information    Type Source Collected On   Blood  11/20/18 0634          Components       Value Reference Range Flag Lab   Creatinine 2.00 0.66 - 1.25 mg/dL H FrStHsLb   GFR Estimate 31 >60 mL/min/1.7m2 L FrStHsLb   Comment:  Non  GFR Calc   GFR Estimate If Black 38 >60 mL/min/1.7m2 L FrStHsLb   Comment:   GFR Calc            Glucose [[id number]] (Abnormal)  Resulted: 11/20/18 0705, Result status: Final result    Ordering provider: Jes Durham PA-C  11/20/18 0000 Resulting lab: [facility]    Specimen Information    Type Source Collected On   Blood  11/20/18 0634          Components       Value Reference Range Flag Lab   Glucose 133 70 - 99 mg/dL H FrStHsLb            Hemoglobin [[id number]] (Abnormal)  Resulted: 11/20/18 0649, Result status: Final result    Ordering provider: Mingo Wang MD  11/20/18 0000 Resulting lab: [facility]    Specimen Information    Type Source Collected On   Blood  11/20/18 0634          Components       Value Reference Range Flag Lab   Hemoglobin 11.4 13.3 - 17.7 g/dL L FrStHsLb            Testing Performed By     Lab - Abbreviation Name Director Address Valid Date Range    14 - StLb [facility] Unknown [address] [place] [address] 05/08/15 1057 - Present            Unresulted Labs (24h ago through future)    Start       Ordered    11/22/18 0600  Platelet count  (enoxaparin  (LOVENOX) (Weight less than 40 kg OR CrCl 15 - 30 mL/min) *enoxaparin (LOVENOX) NOT recommended if CrCl less than 15 mL/min)  EVERY THREE DAYS,   Routine     Comments:  Repeat every 3 days while on VTE prophylaxis. If no result is listed, this lab has not been done the past 365 days. LATEST LAB RESULT: Platelet Count (10e9/L)       Date                     Value                 11/19/2018               214              ----------      11/19/18 1848    11/20/18 0600  Creatinine  EVERY THREE DAYS,   Routine      11/19/18 1857    Unscheduled  Hemoglobin  CONDITIONAL X 2,   Routine     Comments:  IF morning Hemoglobin result is LESS than 8.0 on POD 1 and/or POD 2, release order and recheck Hemoglobin in the evening at 1700.  Notify Provider if second Hemoglobin result is LESS than 7.5.    11/19/18 1848         Imaging Results - 3 Days      XR Chest 2 Views [[id number]]  Resulted: 11/21/18 0608, Result status: Final result    Ordering provider: Jules Aguilar MD  11/20/18 2237 Resulted by: Norris Collins MD    Performed: 11/20/18 2341 - 11/20/18 2351 Resulting lab: RADIOLOGY RESULTS    Narrative:       XR CHEST 2 VW  11/20/2018 11:51 PM     HISTORY: Hypoxia.    COMPARISON: 11/19/2018.    FINDINGS: The heart is enlarged. There is pulmonary vascular  congestion. No pulmonary edema. There are small bilateral pleural  effusions. Mild left basilar infiltrate. No pneumothorax. Degenerative  disease in the spine.      Impression:       IMPRESSION: Pulmonary vascular congestion. Small bilateral pleural  effusions.    NORRIS COLLINS MD      US Carotid Bilateral [[id number]]  Resulted: 11/20/18 1629, Result status: Final result    Ordering provider: Brett Bryant MD  11/20/18 1501 Resulted by: Raman Steinberg MD    Performed: 11/20/18 1556 - 11/20/18 1611 Resulting lab: RADIOLOGY RESULTS    Narrative:       BILATERAL CAROTID ULTRASOUND   11/20/2018 4:11 PM     HISTORY:  Syncope.     COMPARISON: None.    RIGHT  CAROTID FINDINGS:  Minimal plaque  Right ICA PSV:  105  cm/sec.  Right ICA EDV:  26 cm/sec.  Right ICA/CCA PSV Ratio:  1.0    These indicate less than 50% diameter stenosis of the right ICA.    Right Vertebral: Antegrade flow.   Right ECA: Antegrade flow.     LEFT CAROTID FINDINGS:  No plaque.  Left ICA PSV:  123  cm/sec.  Left ICA EDV:  34 cm/sec.  Left ICA/CCA PSV Ratio:  1.1    These indicate less than 50% diameter stenosis of the left ICA.    Left Vertebral: Antegrade flow.   Left ECA: Antegrade flow.     Causes of Decreased Accuracy:   None.       Impression:       IMPRESSION:    1. Less than 50% diameter stenosis of the right ICA relative to the  distal ICA diameter.  2. Less than 50% diameter stenosis of the left ICA relative to the  distal ICA diameter.    SANTOSH ALICEA MD      XR Surgery IVANIA L/T 5 Min Fluoro w Stills [[id number]]  Resulted: 11/20/18 0709, Result status: Final result    Ordering provider: Mingo Wang MD  11/19/18 1644 Resulted by: Oral Snyder MD    Performed: 11/19/18 1545 - 11/19/18 1640 Resulting lab: RADIOLOGY RESULTS    Narrative:       XR SURGERY IVANIA FLUORO LESS THAN 5 MIN W STILLS 11/19/2018 4:40 PM     COMPARISON: Radiographs earlier on the same day.    HISTORY: ORIF Left Femur    NUMBER OF IMAGES ACQUIRED: 4    VIEWS: 4    FLUOROSCOPY TIME: .7      Impression:       IMPRESSION: Intraoperative imaging during LEFT femur open reduction  and internal fixation. Hardware appears intact.    ORAL SNYDER MD      Testing Performed By     Lab - Abbreviation Name Director Address Valid Date Range    104 - Rad Rslts RADIOLOGY RESULTS Unknown Unknown 02/16/05 1553 - Present               ECG/EMG Results      Echocardiogram Complete [[id number]]  Resulted: 11/19/18 1459, Result status: In process    Ordering provider: Jes Durham PA-C  11/19/18 1030 Performed: 11/19/18 1459 - 11/19/18 1459    Resulting lab: [facility]             ECHO COMPLETE WITH  OPTISON [[id number]]  Resulted: 18 1509, Result status: Edited Result - FINAL    Ordering provider: Jes Coppola PA-C  18 1030 Resulted by: Arnulfo Mueller MD    Performed: 18 1459 - 18 1531 Resulting lab: RADIOLOGY RESULTS    Narrative:       [id number]  [facility]  [id number]  [id number]^ANAT^JES^YANETH TOSCANO           [facility]  Echocardiography Laboratory  [address] [place] [id number]        Name: NOEMI MORALES  MRN: [id number]  : 11/15/1925  Study Date: 2018 03:09 PM  Age: 89 yrs  Gender: Male  Patient Location: [facility]  Reason For Study: Syncope  Ordering Physician: JES COPPOLA  Referring Physician: JES COPPOLA  Performed By: KANCHAN Finney     BSA: 2.0 m2  Height: 70 in  Weight: 180 lb  HR: 63  BP: 134/75 mmHg  _____________________________________________________________________________  __        Procedure  Complete Portable Echo Adult. Contrast Optison.  _____________________________________________________________________________  __        Interpretation Summary     The visual ejection fraction is estimated at 60-65%.  Normal left ventricular wall motion  The right ventricle is normal in size and function.  There is no pericardial effusion.  The IVC is normal in size and reactivity with respiration, suggesting normal  central venous pressure.  No hemodynamically significant valvular abnormalities on 2D or color flow  imaging. The study was technically difficult. There is no comparison study  available.  _____________________________________________________________________________  __        Left Ventricle  The left ventricle is normal in size. Proximal septal thickening is noted.  Left ventricular hypertrophy is noted by two-dimensional echocardiography. The  visual ejection fraction is estimated at 60-65%. Left ventricular diastolic  function is indeterminate. Diastolic Doppler findings (E/E' ratio and/or  other  parameters) suggest left ventricular filling pressures are indeterminate.  Normal left ventricular wall motion.     Right Ventricle  The right ventricle is normal in size and function.     Atria  The left atrium is not well visualized. Right atrium not well visualized.     Mitral Valve  The mitral valve leaflets appear thickened, but open well. There is trace  mitral regurgitation. There is no mitral valve stenosis.        Tricuspid Valve  The tricuspid valve is not well visualized. Normal IVC (1.5-2.5cm) with >50%  respiratory collapse; right atrial pressure is estimated at 5-10mmHg. The  right ventricular systolic pressure is approximated at 28.3 mmHg plus the  right atrial pressure.     Aortic Valve  There is moderate trileaflet aortic sclerosis. There is trace aortic  regurgitation. No aortic stenosis is present.     Pulmonic Valve  The pulmonic valve is not well visualized. There is no pulmonic valvular  regurgitation. Normal pulmonic valve velocity.     Vessels  Borderline aortic root dilatation. Normal size ascending aorta. The IVC is  normal in size and reactivity with respiration, suggesting normal central  venous pressure.     Pericardium  There is no pericardial effusion.        Rhythm  Sinus rhythm was noted.  _____________________________________________________________________________  __  MMode/2D Measurements & Calculations  IVSd: 1.2 cm     LVIDd: 4.2 cm  LVIDs: 2.9 cm  LVPWd: 1.3 cm  FS: 30.8 %  LV mass(C)d: 187.8 grams  LV mass(C)dI: 94.1 grams/m2  Ao root diam: 3.9 cm  LA dimension: 3.1 cm  asc Aorta Diam: 3.2 cm  LA/Ao: 0.79  LVOT diam: 2.3 cm  LVOT area: 4.1 cm2  RWT: 0.64        Doppler Measurements & Calculations  MV E max alisha: 40.7 cm/sec  MV A max alisha: 86.3 cm/sec  MV E/A: 0.47  MV dec time: 0.23 sec  TR max alisha: 266.0 cm/sec  TR max P.3 mmHg  E/E' av.8  Lateral E/e': 9.2  Medial E/e': 10.4               _____________________________________________________________________________  __        Report approved by: Carmen Skinner 11/19/2018 04:06 PM       1    Type Source Collected On     11/19/18 1509          View Image (below)              Encounter-Level Documents:     There are no encounter-level documents.      Order-Level Documents:     There are no order-level documents.

## 2018-11-19 NOTE — IP AVS SNAPSHOT
` `     [place] [address] [facility]: [phone]                 INTERAGENCY TRANSFER FORM - NOTES (H&P, Discharge Summary, Consults, Procedures, Therapies)   2018                    Hospital Admission Date: 2018  NOEMI MORALES   : 11/15/1925  Sex: Male        Patient PCP Information     Provider PCP Type    Nick Finney MD General         History & Physicals      H&P by Jes Durham PA-C at 2018 10:59 AM     Author:  Jes Durham PA-C Service:  Hospitalist Author Type:  Physician Assistant - C    Filed:  2018 10:59 AM Date of Service:  2018 10:59 AM Creation Time:  2018 10:31 AM    Status:  Attested :  Jes Durham PA-C (Physician Assistant - C)    Cosigner:  Rosendo Clemente MD at 2018  7:27 AM        Attestation signed by Rosendo Clemente MD at 2018  7:27 AM        Physician Attestation   I, Rosendo Clemente, have reviewed and discussed with the advanced practice provider their history, physical and plan for Noemi Morales. I did not participate in a shared visit by interviewing or examining the patient and this should be billed as an advanced practice provider only visit.    Rosendo Clemente  Date of Service (when I saw the patient): I did not personally see this patient today.                               [facility]    History and Physical  Hospitalist       Date of Admission:  2018  Date of Service (when I saw the patient):[KG1.1] 18[KG1.2]    Assessment & Plan[KG1.3]   Noemi Morales is a 89 year old male with PMHx of HTN, CVA, CKD IV, ILIR utilizing CPAP, peripheral neuropathy, and macular degeneration who presented to the ED on  with syncope and collapse, found to have a minimally displaced intertrochanteric fracture of the hip. Pt slightly hypertensive, remainder of vitals WNL. Pt currently stable.     Syncope and  collapse: Syncopal event this morning upon awakening, pt went to the bathroom and while closing the door, felt dizzy and had subsequent syncopal event. No chest pain or palpitations. Prior dizziness two weeks ago with reintroduction of Norvasc 5 mg po every day on pt's medication regimen. He took two days worth of medication, but stopped this on his own due to dizziness. Does not use a walker or cane for ambulatory assistance. No focal neuro deficits. No headaches or vision changes (note hx of macular degeneration). Sodium 145, chloride 113, creatinine 2.15 with GFR 29. CBC with Hgb 12.8. CXR with left lateral rib fracture of uncertain chronicity. XR Pelvis with minimally displaced intertrochanteric fracture of the left hip. Head CT unremarkable for acute pathology, but does show known old left frontal infarct. EKG with NSR, no ST depression, elevation, or T wave abnormalities; bifascicular block per read.   -- IVF at D5 0.45 NS at 100 ccs/hr   -- Monitor on telemetry   -- Echocardiogram   -- Unable to obtain orthostatic blood pressures due to hip fracture below     Intertrochanteric hip fracture, left, minimally displaced, secondary to above: Per imaging above  -- Ortho aware, plan for OR this evening pending pre-operative assessment  -- Defer routine post-operative cares, IVF, DVT prophylaxis and pain control to Ortho   -- Delirium prevention protocol in place given age   -- Bowel regimen in place while on narcotics   -- Encourage pulmonary toilet; incentive spirometer at bedside   -- PT and OT in the AM   -- CBC and BMP in AM     Pre-operative assessment: denies active chest pain, SOB, palpitations, nausea, vomiting, or recent illness. Has tolerated prior procedures without post-operative complication. No family or personal history of anesthesia intolerance, bleeding coagulopathy. No family hx of sudden death following surgery. No hx of CAD, CHF, or DM, pt is with hx of CVA and has a creatinine >2; estimated risk  of adverse outcome with non-cardiac surgery is moderate risk.   -- Complete syncope evaluation above with echocardiogram this AM, pending no overt abnormalities, pt ok to proceed with above planned surgery without further medical optimization.     HTN: Previously on Norvasc 5 mg po every day; restarted by PCP about two weeks ago, pt took medication for two days, but stopped due to dizziness. Not currently on regimen.   -- Monitor  -- PRN Hydralazine available for SBP >180    ILIR: Pt declines CPAP at this time, available PRN     CKD IV  History of right radical nephrectomy: Baseline creatinine 2.1-2.2. Creatinine on admission 2.15.   -- Monitor     CVA: Left frontal lobe infarction in 5/2018. Head CT on admission showing an old left frontal infarct. No residual deficits.     Peripheral neuropathy   Vitamin B12 deficiency: Resume B12 and gabapentin once verified by pharmacy     Chronic diarrhea: Seen by GI in the outpatient setting. Hold antidiarrheal medication while on narcotics     DVT Prophylaxis: Pneumatic Compression Devices  Code Status: DNR / DNI    Disposition: Expected discharge pending clinical course[KG1.1]     Jes Durham[KG1.3], [place]    This patient was discussed with Dr. Clemente of the Hospitalist Service who agrees with current plans as outlined above.[KG1.1]     Primary Care Physician   Nick Finney    Chief Complaint[KG1.3]   Syncope and collapse  Left hip pain    History is obtained from the patient and wife, Opal, who accompanies patient at bedside.[KG1.1]     History of Present Illness[KG1.3]   Sourav Fine is a 89 year old male with PMHx of HTN, CVA, CKD IV, ILIR utilizing CPAP, peripheral neuropathy, and macular degeneration who presented to the ED on 11/19 with syncope and collapse, found to have a minimally displaced intertrochanteric fracture of the hip. Pt slightly hypertensive, remainder of vitals WNL. Pt currently stable.     Describes syncopal event this morning upon  awakening, pt went to the bathroom and while closing the door, felt dizzy and had subsequent syncopal event. No chest pain or palpitations. Prior dizziness two weeks ago with reintroduction of Norvasc 5 mg po every day on pt's medication regimen. He took two days worth of medication, but stopped this on his own due to dizziness. Does not use a walker or cane for ambulatory assistance. No focal neuro deficits. No headaches or vision changes (note hx of macular degeneration).     In the ED, the patient was seen and assessed by Dr. Denton who obtained basic labs and imaging which revealed a sodium 145, chloride 113, creatinine 2.15 with GFR 29. CBC with Hgb 12.8. CXR with left lateral rib fracture of uncertain chronicity. XR Pelvis with minimally displaced intertrochanteric fracture of the left hip. Head CT unremarkable for acute pathology, but does show known old left frontal infarct. EKG with NSR, no ST depression, elevation, or T wave abnormalities; bifascicular block per read. Pt was given a 1 L bolus of IVF in the ED, morphine 2 mg and Ortho was made aware of pt. Assessed him in the ED.     On my interview, pt is resting comfortably in bed. No acute distress. Pain well managed without movement. Confirms above history. Pt denies active chest pain, SOB, palpitations, nausea, vomiting, or recent illness. Has tolerated prior procedures without post-operative complication. No family or personal history of anesthesia intolerance, bleeding coagulopathy. No family hx of sudden death following surgery. No hx of CAD, CHF, or DM, pt is with hx of CVA and has a creatinine >2; estimated risk of adverse outcome with non-cardiac surgery is moderate risk.[KG1.1]     Past Medical History[KG1.3]    1. HTN   2. ILIR utilizing CPAP at home  3. CKD IV; baseline creatinine 2.1-2.2.   4. History of right radical nephrectomy   5. CVA, Left frontal lobe infarction in 5/2018. No residual deficits   6. Peripheral neuropathy   7. Vitamin  B12 deficiency:   8. Chronic diarrhea:[KG1.1]     Past Surgical History[KG1.3]   1. Hernia repair   2. Transurethral needle ablation   3. Right radical nephrectomy   4. [facility]CHG brachytherapy seed palladium   5. Colonoscopy[KG1.1]     Prior to Admission Medications   Prior to Admission Medications   Prescriptions Last Dose Informant Patient Reported? Taking?   Multiple Vitamins-Minerals (PRESERVISION AREDS) TABS   Yes Yes   Sig: Take 2 tablets by mouth   amLODIPine (NORVASC) 5 MG tablet   Yes Yes   Sig: Take 5 mg by mouth   aspirin 81 MG EC tablet   Yes Yes   Sig: Take 81 mg by mouth   colestipol (COLESTID) 1 g tablet   Yes Yes   Sig: Take 1 g by mouth   cyanocobalamin (VITAMIN  B-12) 1000 MCG tablet   Yes Yes   Sig: Take 1,000 mcg by mouth   gabapentin (NEURONTIN) 300 MG capsule   Yes Yes   Sig: Take 1,500 mg by mouth      Facility-Administered Medications: None     Allergies   No Known Allergies    Social History[KG1.3]   Prior tobacco use via pipe for 30 years, quit in 1989. Has maximum two glasses per day. No illicit drug use. Recently moved from [place], lives in [facility]. No walker or cane for ambulatory assistance.[KG1.1]     Family History[KG1.3]   Mother with CAD?     No family history of sudden death following surgery, bleeding coagulopathies, or intolerance to anesthesia.[KG1.1]     Review of Systems[KG1.3]   The 10 point Review of Systems is negative other than noted in the HPI.[KG1.1]    Physical Exam   Temp: 98  F (36.7  C) Temp src: Oral BP: 125/80   Heart Rate: 65 Resp: 16 SpO2: 96 % O2 Device: None (Room air)[KG1.3]    Vital Signs with Ranges[KG1.1]  Temp:  [98  F (36.7  C)] 98  F (36.7  C)  Heart Rate:  [59-65] 65  Resp:  [11-16] 16  BP: (125-169)/(72-82) 125/80  SpO2:  [95 %-97 %] 96 %  180 lbs 0 oz[KG1.3]    CONSTITUTIONAL: Pt laying in bed, dressed in hospital garb. Appears comfortable. Cooperative with interview. Accompanied by wife, Opal, at bedside.  HEENT: Normocephalic. Lac noted on  left posterior scalp, bleeding controlled. Negative for conjunctival redness or scleral icterus.    CARDIOVASCULAR: RRR, no murmurs, rubs, or extra heart sounds appreciated. Pulses +2/4 and regular in upper and lower extremities, bilaterally.   RESPIRATORY: No increased work of breathing.  CTA, bilat; no wheezes, rales, or rhonchi appreciated.  GASTROINTESTINAL:  Abdomen soft, non-distended. BS auscultated in all four quadrants. Negative for tenderness to palpation.  No masses or organomegaly noted.  MUSCULOSKELETAL: Did not test strength in left LE due to pain. No gross deformities noted. Normal muscle tone.   HEMATOLOGIC/LYMPHATIC/IMMUNOLOGIC: No anterior or posterior cervical LAD, bilaterally. Negative for lower extremity edema, bilaterally.  NEUROLOGIC: Alert and oriented to person, place, and time.  strength intact. No focal neuro deficits   SKIN: Scalp lac as above[KG1.1]     Data[KG1.3]   Data reviewed today:  EKG with NSR, no overt ST depression, elevation or T wave abnormalities. Do note comment of bifascicular block. Remainder of work-up reviewed and below.[KG1.1]     Recent Labs  Lab 11/19/18  0746   WBC 9.5   HGB 12.8*   MCV 97      *   POTASSIUM 4.6   CHLORIDE 113*   CO2 26   BUN 41*   CR 2.15*   ANIONGAP 6   AGNIESZKA 8.0*   *       Recent Results (from the past 24 hour(s))   XR Chest 1 View    Narrative    CHEST ONE VIEW  11/19/2018 8:35 AM     HISTORY:  Trauma to left hip.     COMPARISON: 1/28/2006      Impression    IMPRESSION: Heart size upper normal but similar to prior. Pulmonary  vascularity within normal limits. The lungs are clear. No pneumothorax  or pleural effusion. There are lower left lateral rib fractures of  uncertain chronicity.    MALLORY BEAL MD   XR Pelvis w Hip Left 1 View    Narrative    PELVIS WITH LEFT HIP LATERAL TWO VIEWS  11/19/2018 8:35 AM     HISTORY: Left hip pain.     COMPARISON: None.      Impression    IMPRESSION:  Minimally displaced  intertrochanteric fracture of the  left hip. There are degenerative changes of both hips, left worse than  right. Prostate seeds noted.    MALLORY BEAL MD   Head CT w/o contrast    Narrative    CT SCAN OF THE HEAD WITHOUT CONTRAST   11/19/2018 8:48 AM     HISTORY:  Head pain, fall.     TECHNIQUE:  Axial images of the head and coronal reformations without  IV contrast material. Radiation dose for this scan was reduced using  automated exposure control, adjustment of the mA and/or kV according  to patient size, or iterative reconstruction technique.    COMPARISON: None.    FINDINGS:  Mild volume loss is present. Geographic hypoattenuation and  volume loss within the left middle/inferior frontal gyrus is present  consistent with old infarct. No evidence of acute ischemia,  hemorrhage, mass, mass effect, or hydrocephalus. The visualized  calvarium, skull base, paranasal sinuses, and extracranial soft  tissues are unremarkable. Bilateral lens replacements are present.      Impression    IMPRESSION:  No acute intracranial abnormality. 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    PADDY LANDA MD[KG1.3]          Revision History        User Key Date/Time User Provider Type Action    > KG1.2 11/19/2018 10:59 AM Jes Durham PA-C Physician Assistant - C Sign     KG1.3 11/19/2018 10:32 AM Jes Durham PA-C Physician Assistant - C      KG1.1 11/19/2018 10:31 AM Jes Durham PA-C Physician Assistant - C                      Discharge Summaries      Discharge Summaries by Hansel Limon MD at 11/23/2018  9:47 AM     Author:  Hansel Limon MD Service:  Hospitalist Author Type:  Physician    Filed:  11/23/2018 10:15 AM Date of Service:  11/23/2018  9:47 AM Creation Time:  11/23/2018  9:47 AM    Status:  Signed :  Hansel Limon MD (Physician)         [facility]    Discharge Summary  Hospitalist    Date of Admission:  11/19/2018  Date of  Discharge:[TK1.1]  11/23/2018[TK1.2]  Discharging Provider: Hansel Limon MD  Date of Service (when I saw the patient):[TK1.1] 11/23/18[TK1.2]    Discharge Diagnoses     Syncope, suspect orthostatic hypotension.     Acute hypoxic resp failure due to atelectasis and volume overload    Left intertrochanteric hip fracture s/p ORIF 11/19    Acute blood loss anemia    Essential HTN    ILIR    CKD stage 4    H/o radical nephrectomy    H/o CVA    Peripheral neuropathy    Vit B12 deficiency       History of Present Illness   Sourav Fine is a 89 year old male who was admitted on 11/19/2018. Past with history of CVA, HTN, ILIR, CKD who presented with syncope with associated left hip fracture s/p ORIF 11/19.    Hospital Course   Sourav Fine was admitted on 11/19/2018.  The following problems were addressed during his hospitalization:     Syncope  Etiology unclear, highest suspicion for hypovolemia given prodrome and hypernatremia at admission. He was on Amlodipine and BP as well was soft, so could be orthostatic hypotension as well. Also with recurrent event when got up with PT 11/20 (orthostatics unfortunately not checked).  Denied cardiorespiratory symptoms with non-ischemic EKG (trops not trended).  TTE 11/19 with EF 60-65% without WMA or significant valvular disease.  Head CT unremarkable for acute pathology, but does show known old left frontal infarct.  Neuro exam non-focal.  Carotid US 11/21 negative for stenosis.  Tele negative for arrhythmia. Symptoms reported not consistent with seizure. Sustained Lt hip fracture which was treated with ORIF, going to TCU, continue to monitor. Fall risk. His PTA amlodipine was decreased to half at discharge.      Acute hypoxic resp failure due to volume overload and atelectasis.   Hypoxic overnight 11/20-11/21 after initiation of IVF. CXR with pulmonary edema and mild pleural effusions. Received lasix 40 mg IV x1 with good response.  - oxygen requirements improved, weaned off  supplemental O2  - I/O's even to slightly negative.   - Diuretic not continued since weaned off oxygen, he was intravascularly dry when he presented, and given poor oral intake.  - Has temp of 100.2, suspect atelectasis, patient is forgetful, Continue to encourage incentive spirometry at TCU as well after discharge, discussed with nursing staff to remind him frequently.      Left intertrochanteric hip fracture s/p ORIF 11/19  Due to fall. Procedure uncomplicated.    - post-op managed per Ortho  - pain well controlled, and doing well with therapy and discharging to rehab. (TCU)     Acute blood loss anemia  -hgb 12.8 > 11.4 > 10.4>10.3    -Hb stable.      HTN  Placed on Norvasc 5 mg daily 2 weeks ago, but stopped on own due to dizziness.  - Amlodipine resumed at lower dose 2.5 mg daily.     ILIR  Pt declines CPAP at this time, available PRN       CKD IV  History of right radical nephrectomy  Baseline creatinine 2.1-2.2. Creatinine on admission 2.15.   -Cr up slightly 11/21 at 2.38; and is 2.21>> 2.20 which is near his baseline today.  -Light increase in creatinine could also be due to urinary retention.  Now has Valencia catheter.      Urinary retention:  Post op developed urinary retention.  Unable to obtain any symptoms consistent with BPH but likely has it given his age.  -Valencia catheter was placed 11/21, Flomax started  -Voiding trial at TCU, if retention, needs urology referral.     CVA  Left frontal lobe infarction in 5/2018. Head CT on admission showing an old left frontal infarct. No residual deficits.       Peripheral neuropathy   Vitamin B12 deficiency  - Continue PTA gabapentin     Chronic diarrhea  Seen by GI in the outpatient setting. Hold antidiarrheal medication while on narcotics.    Hansel Limon MD  Hospitalist    Significant Results and Procedures   Multiple XRays, CT head, US carotids reports attached  2D ECHO  ORIF for Lt hip fracture.     Pending Results   These results will be followed up by  none  Unresulted Labs Ordered in the Past 30 Days of this Admission     No orders found from 9/20/2018 to 11/20/2018.          Code Status   DNR / DNI       Primary Care Physician   Nick Finney    Constitutional:  Well developed, well nourished male in no acute distress  HEENT: PERRLA EOMI  Respiratory: equal air entry, lungs clear  Cardiovascular: Regular S1S2, no tachycardia  GI: abdomen soft, normal bowel sounds  Lymph: 1+ ankle edema unchanged  : Valencia catheter in place.   Other: alert and confused, but calm and appropriate, cranial nerves grossly intact.       Discharge Disposition   Discharged to TCU  Condition at discharge: Stable    Consultations This Hospital Stay   PHYSICAL THERAPY ADULT IP CONSULT  OCCUPATIONAL THERAPY ADULT IP CONSULT  SOCIAL WORK IP CONSULT  ORTHOPEDIC SURGERY IP CONSULT  PHARMACY IP CONSULT  OCCUPATIONAL THERAPY ADULT IP CONSULT  PHYSICAL THERAPY ADULT IP CONSULT  PHYSICAL THERAPY ADULT IP CONSULT  OCCUPATIONAL THERAPY ADULT IP CONSULT  CARE TRANSITION RN/SW IP CONSULT    Time Spent on this Encounter   IHansel, personally saw the patient today and spent greater than 30 minutes discharging this patient.    Discharge Orders     General info for SNF   Length of Stay Estimate: Short Term Care: Estimated # of Days <30  Condition at Discharge: Improving  Level of care:skilled   Rehabilitation Potential: Good  Admission H&P remains valid and up-to-date: Yes  Recent Chemotherapy: N/A  Use Nursing Home Standing Orders: Yes     Mantoux instructions   Give two-step Mantoux (PPD) Per Facility Policy Yes     Reason for your hospital stay   Left intertrochanteric femur fracture open reduction and internal fixation     Wound care   Site:   Left hip x2  Instructions:  Leave dressing intact if Aquacel and remove at POD #7-10, remove staples POD #14  Daily dressing changes with gauze and tape     Follow Up and recommended labs and tests   Follow up with Dr. Wang in 2-3 weeks.  No  follow up labs or test are needed. Call for appointment [phone]     Activity - Up with assistive device     Weight bearing status   WBAT     Additional Discharge Instructions   Routine care of Valencia catheter .  Valencia catheter to gravity.  Voiding trial in 4-5 days, if retains, replace a catheter and needs referral to urology.     Physical Therapy Adult Consult   Evaluate and treat as clinically indicated.    Reason:  Left intertrochanteric femur fracture open reduction and internal fixation     Occupational Therapy Adult Consult   Evaluate and treat as clinically indicated.    Reason:  Left intertrochanteric femur fracture open reduction and internal fixation     Fall precautions     Advance Diet as Tolerated   Follow this diet upon discharge: Orders Placed This Encounter     Advance Diet as Tolerated: Regular Diet Adult     Advance Diet as Tolerated   Follow this diet upon discharge: Orders Placed This Encounter       Advance Diet as Tolerated: Regular Diet Adult       Discharge Medications   Current Discharge Medication List      START taking these medications    Details   acetaminophen (TYLENOL) 325 MG tablet Take 2 tablets (650 mg) by mouth every 6 hours as needed for mild pain  Qty: 40 tablet, Refills: 0    Associated Diagnoses: Closed nondisplaced intertrochanteric fracture of left femur, initial encounter (H)      senna-docusate (SENOKOT-S;PERICOLACE) 8.6-50 MG per tablet Take 2 tablets by mouth 2 times daily  Qty: 30 tablet, Refills: 0    Associated Diagnoses: Closed nondisplaced intertrochanteric fracture of left femur, initial encounter (H)      tamsulosin (FLOMAX) 0.4 MG capsule Take 1 capsule (0.4 mg) by mouth daily  Qty: 60 capsule    Associated Diagnoses: Urinary retention      traMADol (ULTRAM) 50 MG tablet Take 1 tablet (50 mg) by mouth every 6 hours as needed for moderate pain  Qty: 30 tablet, Refills: 0    Associated Diagnoses: Closed nondisplaced intertrochanteric fracture of left femur,  initial encounter (H)         CONTINUE these medications which have CHANGED    Details   amLODIPine (NORVASC) 2.5 MG tablet Take 1 tablet (2.5 mg) by mouth daily    Comments: Hold if SBP<100  Associated Diagnoses: Benign essential hypertension      aspirin 325 MG EC tablet Take 1 tablet (325 mg) by mouth daily  Qty: 45 tablet, Refills: 0    Associated Diagnoses: Closed nondisplaced intertrochanteric fracture of left femur, initial encounter (H)         CONTINUE these medications which have NOT CHANGED    Details   cyanocobalamin (VITAMIN  B-12) 1000 MCG tablet Take 1,000 mcg by mouth daily       gabapentin (NEURONTIN) 300 MG capsule Take 1,500 mg by mouth At Bedtime       loperamide (IMODIUM) 2 MG capsule Take 2 mg by mouth 2 times daily as needed for diarrhea      Multiple Vitamins-Minerals (PRESERVISION AREDS) TABS Take 1 tablet by mouth 2 times daily            Allergies   No Known Allergies  Data   Most Recent 3 CBC's:  Recent Labs   Lab Test  11/22/18   0652  11/21/18   0736  11/20/18   0634  11/19/18   0746   WBC   --    --    --   9.5   HGB  10.3*  10.4*  11.4*  12.8*   MCV   --    --    --   97   PLT  172   --    --   214      Most Recent 3 BMP's:  Recent Labs   Lab Test  11/23/18   0551  11/22/18   0652  11/21/18   0736  11/20/18   0634  11/19/18   0746   NA   --   143  144   --   145*   POTASSIUM   --   4.5  4.5   --   4.6   CHLORIDE   --   114*  114*   --   113*   CO2   --   24  23   --   26   BUN   --   44*  44*   --   41*   CR  2.20*  2.21*  2.38*  2.00*  2.15*   ANIONGAP   --   5  7   --   6   AGNIESZKA   --   8.0*  7.2*   --   8.0*   GLC   --   115*  121*  133*  111*     Most Recent 2 LFT's:No lab results found.  Most Recent INR's and Anticoagulation Dosing History:  Anticoagulation Dose History     Recent Dosing and Labs Latest Ref Rng & Units 1/26/2006    INR 0.86 - 1.14 0.98        Most Recent 3 Troponin's:No lab results found.  Most Recent Cholesterol Panel:No lab results found.  Most Recent 6 Bacteria  Isolates From Any Culture (See EPIC Reports for Culture Details):No lab results found.  Most Recent TSH, T4 and A1c Labs:No lab results found.  Results for orders placed or performed during the hospital encounter of 11/19/18   Head CT w/o contrast    Narrative    CT SCAN OF THE HEAD WITHOUT CONTRAST   11/19/2018 8:48 AM     HISTORY:  Head pain, fall.     TECHNIQUE:  Axial images of the head and coronal reformations without  IV contrast material. Radiation dose for this scan was reduced using  automated exposure control, adjustment of the mA and/or kV according  to patient size, or iterative reconstruction technique.    COMPARISON: None.    FINDINGS:  Mild volume loss is present. Geographic hypoattenuation and  volume loss within the left middle/inferior frontal gyrus is present  consistent with old infarct. No evidence of acute ischemia,  hemorrhage, mass, mass effect, or hydrocephalus. The visualized  calvarium, skull base, paranasal sinuses, and extracranial soft  tissues are unremarkable. Bilateral lens replacements are present.      Impression    IMPRESSION:  No acute intracranial abnormality. 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    PADDY LANDA MD   XR Pelvis w Hip Left 1 View    Narrative    PELVIS WITH LEFT HIP LATERAL TWO VIEWS  11/19/2018 8:35 AM     HISTORY: Left hip pain.     COMPARISON: None.      Impression    IMPRESSION:  Minimally displaced intertrochanteric fracture of the  left hip. There are degenerative changes of both hips, left worse than  right. Prostate seeds noted.    MALLORY BEAL MD   XR Chest 1 View    Narrative    CHEST ONE VIEW  11/19/2018 8:35 AM     HISTORY:  Trauma to left hip.     COMPARISON: 1/28/2006      Impression    IMPRESSION: Heart size upper normal but similar to prior. Pulmonary  vascularity within normal limits. The lungs are clear. No pneumothorax  or pleural effusion. There are lower left lateral rib fractures of  uncertain chronicity.    MALLORY BEAL MD   XR Surgery IVANIA  L/T 5 Min Fluoro w Stills    Narrative    XR SURGERY IVANIA FLUORO LESS THAN 5 MIN W STILLS 11/19/2018 4:40 PM     COMPARISON: Radiographs earlier on the same day.    HISTORY: ORIF Left Femur    NUMBER OF IMAGES ACQUIRED: 4    VIEWS: 4    FLUOROSCOPY TIME: .7      Impression    IMPRESSION: Intraoperative imaging during LEFT femur open reduction  and internal fixation. Hardware appears intact.    ORAL SNYDER MD   US Carotid Bilateral    Narrative    BILATERAL CAROTID ULTRASOUND   11/20/2018 4:11 PM     HISTORY:  Syncope.     COMPARISON: None.    RIGHT CAROTID FINDINGS:  Minimal plaque  Right ICA PSV:  105  cm/sec.  Right ICA EDV:  26 cm/sec.  Right ICA/CCA PSV Ratio:  1.0    These indicate less than 50% diameter stenosis of the right ICA.    Right Vertebral: Antegrade flow.   Right ECA: Antegrade flow.     LEFT CAROTID FINDINGS:  No plaque.  Left ICA PSV:  123  cm/sec.  Left ICA EDV:  34 cm/sec.  Left ICA/CCA PSV Ratio:  1.1    These indicate less than 50% diameter stenosis of the left ICA.    Left Vertebral: Antegrade flow.   Left ECA: Antegrade flow.     Causes of Decreased Accuracy:   None.       Impression    IMPRESSION:    1. Less than 50% diameter stenosis of the right ICA relative to the  distal ICA diameter.  2. Less than 50% diameter stenosis of the left ICA relative to the  distal ICA diameter.    SANTOSH ALICEA MD   XR Chest 2 Views    Narrative    XR CHEST 2 VW  11/20/2018 11:51 PM     HISTORY: Hypoxia.    COMPARISON: 11/19/2018.    FINDINGS: The heart is enlarged. There is pulmonary vascular  congestion. No pulmonary edema. There are small bilateral pleural  effusions. Mild left basilar infiltrate. No pneumothorax. Degenerative  disease in the spine.      Impression    IMPRESSION: Pulmonary vascular congestion. Small bilateral pleural  effusions.    GERSON JOHNSTON MD[TK1.1]        Revision History        User Key Date/Time User Provider Type Action    > TK1.2 11/23/2018 10:15 AM Hansel Limon MD  Physician Sign     TK1.1 11/23/2018  9:47 AM Hansel Limon MD Physician                      Consult Notes      Consults signed by Mingo Wang MD at 11/22/2018 11:50 PM      Author:  Mingo Wang MD Service:  Orthopedics Author Type:  Physician    Filed:  11/22/2018 11:50 PM Date of Service:  11/19/2018  4:53 PM Creation Time:  11/21/2018  1:01 PM    Status:  Signed :  Mingo Wang MD (Physician)         Consult Date:  11/19/2018      Revised      This consult was asked for by Dr. Jes Durham.         PREOPERATIVE DIAGNOSIS:  Left intertrochanteric femur fracture.         HISTORY OF PRESENT ILLNESS:  Mr. Fine is a very pleasant 89-year-old gentleman who was living at home with his wife.  At 6:00 a.m. he got of this morning, went to the bathroom.  He became dizzy and unsteady, causing him to fall.  He bumped his head and landed on his left hip.  He denies any loss of consciousness.  He was taken to [facility] and found to have a left intertrochanteric femur fracture and a left head laceration, which was where the bleeding was controlled and the patient was admitted for definitive care.         PAST MEDICAL AND SURGICAL HISTORY:  Significant for arthritis, malignant neoplasm of the prostate, bilateral not excessive age-related macular degeneration, malignant neoplasm of the kidney excluding the renal pelvis, hypertension, cervicalgia, neuropathy, erectile dysfunction,  cervical lesion, obstructive sleep apnea, malignant neoplasm of the skin, low vitamin B12 level, CVA, and chronic midline low back pain.  He has had previous orthopedic surgery.         MEDICATIONS ON ADMISSION:  Norvasc, aspirin, Colestid, vitamin B12, Neurontin, PreserVision.           ALLERGIES:  NO KNOWN DRUG ALLERGIES.         FAMILY HISTORY:  Noncontributory.         SOCIAL HISTORY:  , lives with his wife and ambulates independently.         REVIEW OF SYSTEMS:  A  10-point review of systems is positive for left hip pain in a left posterior occipital wound.         PHYSICAL EXAMINATION:  On physical examination, lying in his hospital bed in no apparent distress.  Alert and oriented.  Very conversive, very pleasant.  He is afebrile.  Vital signs are stable.  He has an abrasion on the left posterior skull, but otherwise no obvious other head trauma.  Cranial nerves are grossly intact.  Right and left upper extremities are within normal limits.  Skin is clean, dry, and intact.  Palpable radial pulse.  Radial, ulnar, and median nerve sensation and function intact bilaterally.  Right and left lower extremity skin is clean, dry, and intact.  Palpable dorsalis pedis pulses.  EHL, FHL, tibial, gastrocsoleus all fire.  Superficial deep peroneus, saphenous, tibial and sural nerves and sensation is intact bilaterally.  He is tender to palpation around his left hip and left leg is somewhat shortened and externally rotated.  The right lower extremity is no tenderness to palpation or crepitus.         IMAGING STUDIES:  X-rays reviewed.  AP pelvis and left lateral hip shows a left intertrochanteric femur fracture.         LABORATORY DATA:  On admission:  Sodium 145, potassium 4.6, creatinine 2.15.  White blood cell count 9.5, hemoglobin 12.8.         IMPRESSION/REPORT/PLAN:  I had a long discussion with Mr. Fine regarding his left intertrochanteric femur fracture.  I think he would benefit from internal fixation with intramedullary hip screw for improved pain management and increased ambulation.  He and his wife are happy with this plan of care.  We will make arrangements for surgery as soon as he is medically stable.      Revised work type lg 18         NAVNEET TERRELL MD             D: 2018   T: 2018   MT: ALLYSON      Name:     NOEMI FINE   MRN:      [id number]        Account:       [id number]   :      11/15/1925           Consult Date:  2018       Document: [id number]       cc: ZAHIDA COPPOLA PA-C   [JN1.1]   Revision History        User Key Date/Time User Provider Type Action    > JN1.1 11/22/2018 11:50 PM Mingo Wang MD Physician Sign            Consults by Lina Dubois LICSW at 11/22/2018 12:29 PM     Author:  Lina Dubois LICSW Service:  Social Work Author Type:      Filed:  11/22/2018 12:29 PM Date of Service:  11/22/2018 12:29 PM Creation Time:  11/22/2018 12:24 PM    Status:  Signed :  Lina Dubois LICSW ()     Consult Orders:    1. Care Transition RN/SW IP Consult [[id number]] ordered by Brett Bryant MD at 11/20/18 1508                Care Transition Initial Assessment - SW  Reason For Consult: discharge planning  Met with: Patient    Active Problems:    Intertrochanteric fracture (H)    Fracture, intertrochanteric, left femur (H)       DATA  Lives With: spouse  Living Arrangements: apartment     Identified issues/concerns regarding health management:       Per care transitions consult for discharge planning and tcu placement on discharge.  Patient was admitted on 11-19-18 after a fall resulting in a hip fracture.  The tentative date of discharge is 11-23-18.  Reviewed chart and spoke with patient regarding discharge plans.  Per patient report, patient lives with his wife in a third floor apartment with an elevator.  Patient uses no assistive devices at baseline.  Reviewed the therapy discharge recommendations of tcu placement on discharge and patient is in agreement.  Patient states he would like to go to a facility close to home and he is asking for [facility] to [facility].  Referrals sent, via discharge on the double, to check bed availability.  Patient is also asking that we contact his son Papo, [phone], once we have an update.    ASSESSMENT  Cognitive Status:  awake and alert  Concerns to be addressed: discharge planning and tcu placement on  discharge.     PLAN  Financial costs for the patient includes N/A.  Patient given options and choices for discharge TCU choices.  Patient/family is agreeable to the plan?  Yes  Patient Goals and Preferences: TCU placement on discharge.  Patient anticipates discharging to:  TCU      Will confirm a bed, continue to follow, and assist with a safe discharge plan.[SJ1.1]    Lina Dubois[SJ1.2], [facility] [place]  [phone][SJ1.1]         Revision History        User Key Date/Time User Provider Type Action    > SJ1.2 11/22/2018 12:29 PM Lina Dubois LICSW  Sign     SJ1.1 11/22/2018 12:24 PM Lina Dubois LICSW              Consults by Sheila King PA-C at 11/19/2018  1:08 PM     Author:  Sheila King PA-C Service:  Orthopedics Author Type:  Physician Assistant - C    Filed:  11/19/2018  1:13 PM Date of Service:  11/19/2018  1:08 PM Creation Time:  11/19/2018  1:08 PM    Status:  Attested :  Sheila Knig PA-C (Physician Assistant - C)    Cosigner:  Mingo Wang MD at 11/20/2018  3:05 PM         Consult Orders:    1. Orthopedic Surgery IP Consult: Patient to be seen: Routine - within 24 hours; mechanical fall with minimally displaced intertrochanteric fracture of left hip; Consultant may enter orders: Yes [[id number]] ordered by Jes Durham PA-C at 11/19/18 1022           Attestation signed by Mingo Wang MD at 11/20/2018  3:05 PM        Physician Attestation   I agree with the information in this note.    [facility]                               [facility] Orthopedic Consultation    Sourav Fine MRN# [id number]   Age: 89 year old YOB: 1925     Date of Admission:  11/19/2018    Reason for consult: Left intertrochanteric femur fracture       Requesting physician: Jes Durham PAC       Level of consult: Consult, follow and place orders           Assessment and Plan:    Assessment:   Left intertrochanteric femur fracture      Plan:   ORIF of left intertrochanteric femur fracture today as OR time permits  NPO  Bed rest  May require abdi - discuss with Sheila RBO  Pain medication as needed, avoid narcotics if possible  Hospitalist has optimized for surgery           Chief Complaint:   Left intertrochanteric femur fracture         History of Present Illness:   This patient is a 89 year old male who presents with the following condition requiring a hospital admission:    Mr. Fine states that he got up to use the bathroom this AM at about 6 am when he felt dizzy which resulted in a fall onto his left side. He has not been able to ambulate since the fall. His wife was able to creatively get him up in a rolling chair. He was brought to [facility] ED via EMS. He has been NPO since last evening. Takes ASA 81 mg daily.   Both his wife and himself live independently in a condo.           Past Medical History:     Past Medical History:   Diagnosis Date     Arthritis      Hypertension              Past Surgical History:     Past Surgical History:   Procedure Laterality Date     ORTHOPEDIC SURGERY               Social History:     Social History   Substance Use Topics     Smoking status: Former Smoker     Quit date: 1/1/1989     Smokeless tobacco: Never Used     Alcohol use Yes      Comment: a little wine daily             Family History:   No family history on file.          Immunizations:     VACCINE/DOSE   Diptheria   DPT   DTAP   HBIG   Hepatitis A   Hepatitis B   HIB   Influenza   Measles   Meningococcal   MMR   Mumps   Pneumococcal   Polio   Rubella   Small Pox   TDAP   Varicella   Zoster             Allergies:   No Known Allergies          Medications:     Current Facility-Administered Medications   Medication     acetaminophen (TYLENOL) Suppository 650 mg     acetaminophen (TYLENOL) tablet 975 mg     benztropine (COGENTIN) tablet 1-2 mg     bisacodyl (DULCOLAX) Suppository 10 mg      "ceFAZolin (ANCEF) 1 g vial to attach to  ml bag for ADULT or 50 ml bag for PEDS     ceFAZolin (ANCEF) intermittent infusion 2 g in 100 mL dextrose PRE-MIX     dextrose 5% and 0.45% NaCl infusion     HYDROmorphone (PF) (DILAUDID) injection 0.3-0.5 mg     melatonin tablet 1 mg     naloxone (NARCAN) injection 0.1-0.4 mg     ondansetron (ZOFRAN-ODT) ODT tab 4 mg    Or     ondansetron (ZOFRAN) injection 4 mg     oxyCODONE IR (ROXICODONE) tablet 5-10 mg     polyethylene glycol (MIRALAX/GLYCOLAX) Packet 17 g     prochlorperazine (COMPAZINE) injection 5 mg    Or     prochlorperazine (COMPAZINE) tablet 5 mg    Or     prochlorperazine (COMPAZINE) Suppository 12.5 mg     senna-docusate (SENOKOT-S;PERICOLACE) 8.6-50 MG per tablet 1 tablet    Or     senna-docusate (SENOKOT-S;PERICOLACE) 8.6-50 MG per tablet 2 tablet             Review of Systems:   CV: NEGATIVE for chest pain, palpitations or peripheral edema  C: NEGATIVE for fever, chills, change in weight  E/M: NEGATIVE for ear, mouth and throat problems  R: NEGATIVE for significant cough or SOB          Physical Exam:   All vitals have been reviewed  Patient Vitals for the past 24 hrs:   BP Temp Temp src Pulse Heart Rate Resp SpO2 Height Weight   11/19/18 1035 134/75 98  F (36.7  C) Oral 65 - 16 96 % - -   11/19/18 1004 - - - - - 16 - - -   11/19/18 1000 125/80 - - - 65 16 96 % - -   11/19/18 0930 140/72 - - - 62 11 95 % - -   11/19/18 0900 - - - - 59 11 95 % - -   11/19/18 0748 169/82 98  F (36.7  C) Oral - 63 16 97 % 1.778 m (5' 10\") 81.6 kg (180 lb)     No intake or output data in the 24 hours ending 11/19/18 1308    Skin intact over left hip with no abrasions or lacerasions   Minimal erythema of the surrounding skin.   Bilateral calves are soft, non-tender.  Bilateral lower extremity is NVI.  Sensation intact bilateral lower extremities  Active dorsi and plantar flexion bilaterally  +Dp pulse            Data:   All laboratory data reviewed  Results for orders placed " or performed during the hospital encounter of 11/19/18   Head CT w/o contrast    Narrative    CT SCAN OF THE HEAD WITHOUT CONTRAST   11/19/2018 8:48 AM     HISTORY:  Head pain, fall.     TECHNIQUE:  Axial images of the head and coronal reformations without  IV contrast material. Radiation dose for this scan was reduced using  automated exposure control, adjustment of the mA and/or kV according  to patient size, or iterative reconstruction technique.    COMPARISON: None.    FINDINGS:  Mild volume loss is present. Geographic hypoattenuation and  volume loss within the left middle/inferior frontal gyrus is present  consistent with old infarct. No evidence of acute ischemia,  hemorrhage, mass, mass effect, or hydrocephalus. The visualized  calvarium, skull base, paranasal sinuses, and extracranial soft  tissues are unremarkable. Bilateral lens replacements are present.      Impression    IMPRESSION:  No acute intracranial abnormality. 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    PADDY LANDA MD   XR Pelvis w Hip Left 1 View    Narrative    PELVIS WITH LEFT HIP LATERAL TWO VIEWS  11/19/2018 8:35 AM     HISTORY: Left hip pain.     COMPARISON: None.      Impression    IMPRESSION:  Minimally displaced intertrochanteric fracture of the  left hip. There are degenerative changes of both hips, left worse than  right. Prostate seeds noted.    MALLORY BEAL MD   XR Chest 1 View    Narrative    CHEST ONE VIEW  11/19/2018 8:35 AM     HISTORY:  Trauma to left hip.     COMPARISON: 1/28/2006      Impression    IMPRESSION: Heart size upper normal but similar to prior. Pulmonary  vascularity within normal limits. The lungs are clear. No pneumothorax  or pleural effusion. There are lower left lateral rib fractures of  uncertain chronicity.    MALLORY BEAL MD   CBC with platelets differential   Result Value Ref Range    WBC 9.5 4.0 - 11.0 10e9/L    RBC Count 4.14 (L) 4.4 - 5.9 10e12/L    Hemoglobin 12.8 (L) 13.3 - 17.7 g/dL    Hematocrit 40.0  40.0 - 53.0 %    MCV 97 78 - 100 fl    MCH 30.9 26.5 - 33.0 pg    MCHC 32.0 31.5 - 36.5 g/dL    RDW 14.0 10.0 - 15.0 %    Platelet Count 214 150 - 450 10e9/L    Diff Method Automated Method     % Neutrophils 67.5 %    % Lymphocytes 20.2 %    % Monocytes 9.4 %    % Eosinophils 2.5 %    % Basophils 0.2 %    % Immature Granulocytes 0.2 %    Absolute Neutrophil 6.4 1.6 - 8.3 10e9/L    Absolute Lymphocytes 1.9 0.8 - 5.3 10e9/L    Absolute Monocytes 0.9 0.0 - 1.3 10e9/L    Absolute Eosinophils 0.2 0.0 - 0.7 10e9/L    Absolute Basophils 0.0 0.0 - 0.2 10e9/L    Abs Immature Granulocytes 0.0 0 - 0.4 10e9/L   Basic metabolic panel   Result Value Ref Range    Sodium 145 (H) 133 - 144 mmol/L    Potassium 4.6 3.4 - 5.3 mmol/L    Chloride 113 (H) 94 - 109 mmol/L    Carbon Dioxide 26 20 - 32 mmol/L    Anion Gap 6 3 - 14 mmol/L    Glucose 111 (H) 70 - 99 mg/dL    Urea Nitrogen 41 (H) 7 - 30 mg/dL    Creatinine 2.15 (H) 0.66 - 1.25 mg/dL    GFR Estimate 29 (L) >60 mL/min/1.7m2    GFR Estimate If Black 35 (L) >60 mL/min/1.7m2    Calcium 8.0 (L) 8.5 - 10.1 mg/dL   Glucose by meter   Result Value Ref Range    Glucose 92 70 - 99 mg/dL   EKG 12 lead   Result Value Ref Range    Interpretation ECG Click View Image link to view waveform and result         Attestation:  I have reviewed today's vital signs, notes, medications, labs and imaging with Dr. Wang.  Amount of time performed on this consult: 25 minutes.    Sheila King PA-C[EC1.1]          Revision History        User Key Date/Time User Provider Type Action    > EC1.1 11/19/2018  1:13 PM Sheila King PA-C Physician Assistant - C Sign                     Progress Notes - Physician (Notes from 11/20/18 through 11/23/18)      Progress Notes by Kyara Madsen BSW at 11/23/2018  9:45 AM     Author:  Kyara Madsen BSW Service:  Social Work Author Type:      Filed:  11/23/2018  9:51 AM Date of Service:  11/23/2018  9:45 AM Creation Time:  11/23/2018   9:45 AM    Status:  Signed :  Kyara Madsen BSW ()         NICK  D: Received call from [facility] that pt can be accepted for admission. Met with pt to see which TCU he'd like to go to. Pt prefers [facility]. Called and updated [facility] that pt would like to go there. Transport aide will get pt at 1400. NICK paged MD for orders.   P: Will continue to follow and support a safe discharge plan    Kyara Madsen MSW, LGSW[HH1.1]      Revision History        User Key Date/Time User Provider Type Action    > HH1.1 11/23/2018  9:51 AM Kyara Madsen BSW  Sign            Progress Notes by Hansel Limon MD at 11/22/2018  2:54 PM     Author:  Hansel Limon MD Service:  Hospitalist Author Type:  Physician    Filed:  11/22/2018  3:06 PM Date of Service:  11/22/2018  2:54 PM Creation Time:  11/22/2018  2:54 PM    Status:  Signed :  Hansel Limon MD (Physician)         [facility]  Hospitalist Progress Note    When I evaluated patient:[TK1.1] 11/22/2018[TK1.2]    Assessment & Plan   Sourav Fine is a 89 year old male who was admitted on 11/19/2018. Past with history of CVA, HTN, ILIR, CKD who presented with syncope with associated left hip fracture s/p ORIF 11/19.    Syncope  Etiology unclear, highest suspicion for hypovolemia given prodrome and hypernatremia at admission.  Also with recurrent event when got up with PT 11/20 (orthostatics unfortunately not checked).  Denied cardiorespiratory symptoms with non-ischemic EKG (trops not trended).  TTE 11/19 with EF 60-65% without WMA or significant valvular disease.  Head CT unremarkable for acute pathology, but does show known old left frontal infarct.  Neuro exam non-focal.  Carotid US 11/21 negative for stenosis.  - Tele negative for arrhythmia, will keep in tele while here.  - Orthostatic negative. Was on IVF, discontinue due to hypoxia  - had been on NS but discontinued due to dyspnea/hypoxia    Acute hypoxic resp  failure due to volume overload  Hypoxic overnight 11/20-11/21 after initiation of IVF. CXR with pulmonary edema and mild pleural effusions. Received lasix 40 mg IV x1 with good response.  - oxygen requirements improved, weaned off supplemental O2  - I/O's Neg 250 ml in past 24 hours  - hold further diuresis since weaned off oxygen, intravascularly dry when he presented, and poor oral intake.  -Continue to encourage incentive spirometry, discussed with nursing staff to remind him frequently.     Left intertrochanteric hip fracture s/p ORIF 11/19  Due to fall. Procedure uncomplicated.    - post-op management per Ortho    Acute blood loss anemia  - hgb 12.8 > 11.4 > 10.4>10.3    -Hb stable.     HTN  Placed on Norvasc 5 mg daily 2 weeks ago, but stopped on own due to dizziness.  - pressures currently low-normal; holding amlodipine  - PRN Hydralazine available for SBP >180    ILIR  Pt declines CPAP at this time, available PRN      CKD IV  History of right radical nephrectomy  Baseline creatinine 2.1-2.2. Creatinine on admission 2.15.   - Cr up slightly 11/21 at 2.38; and is 2.21 which is near his baseline today.  -Light increase in creatinine could also be due to urinary retention.  Now has Valencia catheter.     Urinary retention:  Post op developed urinary retention.  Unable to obtain any symptoms consistent with BPH but likely has it given his age.  -Valencia catheter was placed 11/21, Flomax started  -Voiding trial at TCU, if retention, urology referral.    CVA  Left frontal lobe infarction in 5/2018. Head CT on admission showing an old left frontal infarct. No residual deficits.      Peripheral neuropathy   Vitamin B12 deficiency  - continue PTA gabapentin     Chronic diarrhea  Seen by GI in the outpatient setting. Hold antidiarrheal medication while on narcotics.    DVT Prophylaxis: Enoxaparin (Lovenox) SQ  Code Status: Prior    Disposition: Expected discharge: possibly tomorrow, discussed with patient, RN and SW, unable  to reach his wife.      Hansel Limon MD  Hospitalist    Interval History    Patient is lying in bed comfortable, Hip is sore. Denies dyspnea. On room air currently. No chest pain or dizziness.      -Data reviewed today: I reviewed all new labs and imaging results over the last 24 hours. I personally reviewed no images or EKG's today.    Physical Exam   Temp: 97.9  F (36.6  C) Temp src: Oral BP: 135/64 Pulse: 80 Heart Rate: 86 Resp: 18 SpO2: 91 % O2 Device: None (Room air) Oxygen Delivery: 1 LPM  Vitals:    11/19/18 0748   Weight: 81.6 kg (180 lb)     Vital Signs with Ranges  Temp:  [97.7  F (36.5  C)-98.3  F (36.8  C)] 97.9  F (36.6  C)  Pulse:  [80] 80  Heart Rate:  [82-86] 86  Resp:  [18] 18  BP: (123-143)/(58-65) 135/64  SpO2:  [90 %-94 %] 91 %  I/O last 3 completed shifts:  In: 1050 [P.O.:1050]  Out: 1300 [Urine:1300]    Constitutional: Well developed, well nourished male in no acute distress  HEENT: PERRLA EOMI  Respiratory: Mild bibasilar crackles, no wheezing or tachypnea  Cardiovascular: Regular S1S2, no tachycardia  GI: abdomen soft, normal bowel sounds  Lymph: 1+ ankle edema   Other:  alert and appropriate, cranial nerves grossly intact.    Medications       enoxaparin  30 mg Subcutaneous Q24H     gabapentin  1,500 mg Oral At Bedtime     ranitidine  150 mg Oral At Bedtime     senna-docusate  1 tablet Oral BID    Or     senna-docusate  2 tablet Oral BID     sodium chloride (PF)  3 mL Intracatheter Q8H     tamsulosin  0.4 mg Oral Daily       Data     Recent Labs  Lab 11/22/18  0652 11/21/18  0736 11/20/18  0634 11/19/18  0746   WBC  --   --   --  9.5   HGB 10.3* 10.4* 11.4* 12.8*   MCV  --   --   --  97     --   --  214    144  --  145*   POTASSIUM 4.5 4.5  --  4.6   CHLORIDE 114* 114*  --  113*   CO2 24 23  --  26   BUN 44* 44*  --  41*   CR 2.21* 2.38* 2.00* 2.15*   ANIONGAP 5 7  --  6   AGNIESZKA 8.0* 7.2*  --  8.0*   * 121* 133* 111*       No results found for this or any previous  "visit (from the past 24 hour(s)).[TK1.1]     Revision History        User Key Date/Time User Provider Type Action    > TK1.2 11/22/2018  3:06 PM Hansel Limon MD Physician Sign     TK1.1 11/22/2018  2:54 PM Hansel Limon MD Physician             Progress Notes by Lina Dubois LICSW at 11/22/2018  1:55 PM     Author:  Lina Dubois LICSW Service:  Social Work Author Type:      Filed:  11/22/2018  1:56 PM Date of Service:  11/22/2018  1:55 PM Creation Time:  11/22/2018  1:55 PM    Status:  Signed :  Lina Dubois LICSW ()         SW:  D:  Received a call back from [facility].  They can accept patient tomorrow.  P:  Will continue to follow.[SJ1.1]     Revision History        User Key Date/Time User Provider Type Action    > SJ1.1 11/22/2018  1:56 PM Lina Dubois LICSW  Sign            Progress Notes by Doretha Hernandez PA-C at 11/22/2018  7:50 AM     Author:  Doretha Hernandez PA-C Service:  Orthopedics Author Type:  Physician Assistant - C    Filed:  11/22/2018  7:50 AM Date of Service:  11/22/2018  7:50 AM Creation Time:  11/22/2018  7:50 AM    Status:  Signed :  Doretha Hernandez PA-C (Physician Assistant - C)         Orthopedic Surgery  11/22/2018  POD#: 3    S: Patient voices no complaints today.     O: Blood pressure 129/63, pulse 80, temperature 98  F (36.7  C), temperature source Oral, resp. rate 18, height 1.778 m (5' 10\"), weight 81.6 kg (180 lb), SpO2 90 %.  Lab Results   Component Value Date    HGB 10.3 11/22/2018     Lab Results   Component Value Date    INR 0.98 01/26/2006        I/O last 3 completed shifts:  In: 1050 [P.O.:1050]  Out: 1300 [Urine:1300]    Neurovascularly intact.  Calves are negative bilaterally, both soft and nontender.  The wound is C/D/I.  The wound looks good with minimal erythema of the surrounding skin.    A: Mr. Fine is doing well status post Procedure(s):  ORIF L intertrochanteric femur " fracture    P:   1. Mobilize and continue physical therapy. WBAT.  2. Anticoagulation - ASA at discharge  3. Pain management - controlled  4. Anticipate discharge to TCU when placement found      Doretha Hernandez PA-C  O: [phone][AB1.1]     Revision History        User Key Date/Time User Provider Type Action    > AB1.1 11/22/2018  7:50 AM Doretha Hernandez PA-C Physician Assistant - C Sign            Progress Notes by Brett Bryant MD at 11/21/2018 12:01 PM     Author:  Brett Bryant MD Service:  Hospitalist Author Type:  Physician    Filed:  11/21/2018  3:32 PM Date of Service:  11/21/2018 12:01 PM Creation Time:  11/21/2018 12:01 PM    Status:  Addendum :  Brett Braynt MD (Physician)         [facility]    Hospitalist Progress Note      Assessment & Plan   Sourav Fine is a 89 year old male who was admitted on 11/19/2018.  Past history of CVA, HTN, ILIR, CKD who presents with syncope with associated left hip fracture s/p ORIF 11/19.[PB1.1]      ADDENDUM:  Notified of urinary retention requiring straight cath x3 and now again with PVR of 300.  Will place abdi and start flomax.  Will need to watch pressures carefully as BP soft and presented with syncope.[PB1.2]    Syncope  Etiology unclear, highest suspicion for hypovolemia given prodrome and elevated Na at admission.  Also with recurrent event when got up with PT 11/20 (orthostatics unfortunately not checked).  Denied cardiorespiratory symptoms with non-ischemic EKG (trops not trended).  TTE 11/19 with EF 60-65% without WMA or significant valvular disease.  Head CT unremarkable for acute pathology, but does show known old left frontal infarct.  Neuro exam non-focal.  Carotid US 11/21 negative for stenosis.  - tele negative for arrhythmia, continue   - orthostatics currently negative, will stop monitoring  - had been on NS but received lasix overnight due to dyspnea/hypoxia[PB1.1]    Acute hypoxic resp failure due to volume  overload  Hypoxic overnight 11/20-11/21 after initiation of IVF.  CXR with pulmonary edema and mild pleural effusions.  Received lasix 40 mg IV x1 with good response.  - oxygen requirements improved, wean as able[PB1.3]   - I/O's appear equal past 24 hours  - hold further lasix or diuretics[PB1.1] (may need additional dose as mild crackles but will hold for now given bump in Cr)[PB1.3]     Left intertrochanteric hip fracture s/p ORIF 11/19  Due to fall.  Procedure uncomplicated.    - post-op management per Ortho    Acute blood loss anemia  - hgb 12.8 > 11.4 > 10.4 post-op; monitor     HTN  Placed on Norvasc 5 mg daily 2 weeks ago, but stopped on own due to dizziness.  - pressures currently low-normal; holding amlodipine  - PRN Hydralazine available for SBP >180    ILIR  Pt declines CPAP at this time, available PRN      CKD IV  History of right radical nephrectomy  Baseline creatinine 2.1-2.2. Creatinine on admission 2.15.   - Cr up slightly 11/21 at 2.38; due to receiving lasix overnight[PB1.1]  - repeat BMP in AM[PB1.3]     CVA  Left frontal lobe infarction in 5/2018. Head CT on admission showing an old left frontal infarct. No residual deficits.      Peripheral neuropathy   Vitamin B12 deficiency  - continue PTA gabapentin     Chronic diarrhea  Seen by GI in the outpatient setting. Hold antidiarrheal medication while on narcotics.      DVT Prophylaxis: Enoxaparin (Lovenox) SQ  Code Status: Prior    Disposition: Expected discharge[PB1.1] in 1-2 days once weaned from oxygen, hgb stable, cleared by Ortho[PB1.3].    Brett Bryant    Interval History[PB1.1]   Denies any lightheadedness.  No palpitations, dyspnea, cough.  Pain is well controlled, no other complaints.[PB1.3]    -Data reviewed today: I reviewed all new labs and imaging results over the last 24 hours. I personally reviewed no images or EKG's today.    Physical Exam   Temp: 97.2  F (36.2  C) Temp src: Oral BP: 119/59 Pulse: 76 Heart Rate: 90 Resp: 18 SpO2: 92  % O2 Device: Nasal cannula Oxygen Delivery: 1 LPM  Vitals:    11/19/18 0748   Weight: 81.6 kg (180 lb)     Vital Signs with Ranges  Temp:  [97.2  F (36.2  C)-98.7  F (37.1  C)] 97.2  F (36.2  C)  Pulse:  [76] 76  Heart Rate:  [85-95] 90  Resp:  [14-24] 18  BP: (113-134)/(54-64) 119/59  SpO2:  [78 %-95 %] 92 %  I/O last 3 completed shifts:  In: 1970 [P.O.:1170; I.V.:800]  Out: 1900 [Urine:1900]    Constitutional: Well developed, well nourished male in no acute distress  Respiratory:[PB1.1] Mild bibasilar crackles, no wheezing or tachypnea[PB1.3]  Cardiovascular: regular rate and rhythm, normal S1/S2 without murmur, rubs or gallops  GI: abdomen soft, normal bowel sounds[PB1.1]  Lymph[PB1.3]:[PB1.1] 1+ ankle edema[PB1.3]   Other:  alert and appropriate,[PB1.1] cranial nerves grossly intact[PB1.3]    Medications       acetaminophen  975 mg Oral Q8H     enoxaparin  30 mg Subcutaneous Q24H     gabapentin  1,500 mg Oral At Bedtime     ranitidine  150 mg Oral At Bedtime     senna-docusate  1 tablet Oral BID    Or     senna-docusate  2 tablet Oral BID     sodium chloride (PF)  3 mL Intracatheter Q8H       Data     Recent Labs  Lab 11/21/18  0736 11/20/18  0634 11/19/18  0746   WBC  --   --  9.5   HGB 10.4* 11.4* 12.8*   MCV  --   --  97   PLT  --   --  214     --  145*   POTASSIUM 4.5  --  4.6   CHLORIDE 114*  --  113*   CO2 23  --  26   BUN 44*  --  41*   CR 2.38* 2.00* 2.15*   ANIONGAP 7  --  6   AGNIESZKA 7.2*  --  8.0*   * 133* 111*       Recent Results (from the past 24 hour(s))   US Carotid Bilateral    Narrative    BILATERAL CAROTID ULTRASOUND   11/20/2018 4:11 PM     HISTORY:  Syncope.     COMPARISON: None.    RIGHT CAROTID FINDINGS:  Minimal plaque  Right ICA PSV:  105  cm/sec.  Right ICA EDV:  26 cm/sec.  Right ICA/CCA PSV Ratio:  1.0    These indicate less than 50% diameter stenosis of the right ICA.    Right Vertebral: Antegrade flow.   Right ECA: Antegrade flow.     LEFT CAROTID FINDINGS:  No  plaque.  Left ICA PSV:  123  cm/sec.  Left ICA EDV:  34 cm/sec.  Left ICA/CCA PSV Ratio:  1.1    These indicate less than 50% diameter stenosis of the left ICA.    Left Vertebral: Antegrade flow.   Left ECA: Antegrade flow.     Causes of Decreased Accuracy:   None.       Impression    IMPRESSION:    1. Less than 50% diameter stenosis of the right ICA relative to the  distal ICA diameter.  2. Less than 50% diameter stenosis of the left ICA relative to the  distal ICA diameter.    SANTOSH ALICEA MD   XR Chest 2 Views    Narrative    XR CHEST 2 VW  11/20/2018 11:51 PM     HISTORY: Hypoxia.    COMPARISON: 11/19/2018.    FINDINGS: The heart is enlarged. There is pulmonary vascular  congestion. No pulmonary edema. There are small bilateral pleural  effusions. Mild left basilar infiltrate. No pneumothorax. Degenerative  disease in the spine.      Impression    IMPRESSION: Pulmonary vascular congestion. Small bilateral pleural  effusions.    GERSON JOHNSTON MD[PB1.1]          Revision History        User Key Date/Time User Provider Type Action    > PB1.2 11/21/2018  3:32 PM Brett Bryant MD Physician Addend     PB1.3 11/21/2018 12:22 PM Brett Bryant MD Physician Sign     PB1.1 11/21/2018 12:01 PM Brett Bryant MD Physician             Progress Notes by Kyara Madsen BSW at 11/21/2018  3:04 PM     Author:  Kyara Madsen BSW Service:  Social Work Author Type:      Filed:  11/21/2018  3:06 PM Date of Service:  11/21/2018  3:04 PM Creation Time:  11/21/2018  3:04 PM    Status:  Signed :  Kyara Madsen BSW ()         NICK  D: Attempted to meet with pt to discuss discharge planning. Pt was sleepy and requested SW call his wife, Opal. SW attempted to call Opal at [phone] but phone continued to ring, no voicemail. Consult pending conversation with Opal.   P: Will continue to follow and support safe discharge plan    Kyara Madsen MSW, LGSW[HH1.1]      Revision History        User  Key Date/Time User Provider Type Action    > HH1.1 2018  3:06 PM Kyara Madsen BSW  Sign            Progress Notes by Sheila King PA-C at 2018 11:38 AM     Author:  Sheila King PA-C Service:  Orthopedics Author Type:  Physician Assistant - C    Filed:  2018 11:41 AM Date of Service:  2018 11:38 AM Creation Time:  2018 11:38 AM    Status:  Signed :  Sheila King PA-C (Physician Assistant - C)         Orthopedic Surgery  Sourav Fine  2018  Admit Date:  2018  POD # 2  S/P Left intertrochanteric femur fracture ORIF    Patient resting comfortably in bed.  Sleeping soundly.  Pain controlled.  Tolerating oral intake.    Denies nausea or vomiting  Denies chest pain or shortness of breath  No events overnight.     Alert and orient to person, place, and time.  Vital Sign Ranges  Temperature Temp  Av.1  F (36.7  C)  Min: 97.2  F (36.2  C)  Max: 98.7  F (37.1  C)   Blood pressure Systolic (24hrs), Av , Min:113 , Max:134        Diastolic (24hrs), Av, Min:54, Max:64      Pulse Pulse  Av  Min: 76  Max: 76   Respirations Resp  Av  Min: 14  Max: 24   Pulse oximetry SpO2  Av.6 %  Min: 78 %  Max: 95 %       Dressing is clean, dry, and intact.   Minimal erythema of the surrounding skin.   Bilateral calves are soft, non-tender.  Bilateral lower extremity is NVI.  Sensation intact bilateral lower extremities  Active dorsi and plantar flexion bilaterally  +Dp pulse    Labs:  Recent Labs   Lab Test  18   0736  18   0746   POTASSIUM  4.5  4.6     Recent Labs   Lab Test  18   0736  18   0634  18   0746   HGB  10.4*  11.4*  12.8*     No results for input(s): INR in the last 58764 hours.  Recent Labs   Lab Test  18   0746   PLT  214       A/P  1. Plan   Continue Lovenox for DVT prophylaxis.  ASA on discharge   Mobilize with PT/OT    WBAT.     Continue current pain regiment.   Leave  dressing intact    2. Disposition   Anticipate d/c to TCU based on medical clearance and progress with PT.    Sheila King PA-C[EC1.1]     Revision History        User Key Date/Time User Provider Type Action    > EC1.1 11/21/2018 11:41 AM Sheila King PA-C Physician Assistant - C Sign            Progress Notes by Brett Bryant MD at 11/20/2018  2:46 PM     Author:  Brett Bryant MD Service:  Hospitalist Author Type:  Physician    Filed:  11/20/2018  3:22 PM Date of Service:  11/20/2018  2:46 PM Creation Time:  11/20/2018  2:46 PM    Status:  Signed :  Brett Bryant MD (Physician)         [facility]    Hospitalist Progress Note      Assessment & Plan   Sourav Fine is a 89 year old male who was admitted on 11/19/2018.  Past history of CVA, HTN, ILIR, CKD who presents with syncope with associated left hip fracture s/p ORIF 11/19.      Syncope  Etiology unclear, highest suspicion for hypovolemia given prodrome and elevated Na at admission.  Denied cardiorespiratory symptoms with non-ischemic EKG (trops not trended).  TTE 11/19 with EF 60-65% without WMA or significant valvular disease.  Head CT unremarkable for acute pathology, but does show known old left frontal infarct.   - tele negative for arrhythmia, will continue   - syncopal event today when up with PT (orthostatics not taken)  - check orthostatics qshift  -  ml/hr  - carotid US     Left intertrochanteric hip fracture s/p ORIF 11/19  Due to fall.  Procedure uncomplicated.    - post-op management per Ortho    Acute blood loss anemia  - hgb 12.8 > 11.4 post-op; monitor     HTN  Placed on Norvasc 5 mg daily 2 weeks ago, but stopped on own due to dizziness.  - pressures currently low-normal  - PRN Hydralazine available for SBP >180    ILIR  Pt declines CPAP at this time, available PRN      CKD IV  History of right radical nephrectomy  Baseline creatinine 2.1-2.2. Creatinine on admission 2.15.   - Cr stable 11/20       CVA  Left frontal lobe infarction in 5/2018. Head CT on admission showing an old left frontal infarct. No residual deficits.      Peripheral neuropathy   Vitamin B12 deficiency  - continue PTA gabapentin     Chronic diarrhea  Seen by GI in the outpatient setting. Hold antidiarrheal medication while on narcotics       DVT Prophylaxis: Enoxaparin (Lovenox) SQ  Code Status: Prior    Disposition: Expected discharge in[PB1.1] 2[PB1.2] days once[PB1.1] syncope work-up complete, cleared by Ortho[PB1.2].    Brett Byrant    Interval History[PB1.1]   Pain is well controlled.  Does not recall syncopal event with PT.  He does not feel he lost consciousness.  Denies any chest pain/pressure, palpitations. No other complaints.[PB1.2]    -Data reviewed today: I reviewed all new labs and imaging results over the last 24 hours. I personally reviewed[PB1.1] no images or EKG's today[PB1.2].    Physical Exam   Temp: 98  F (36.7  C) Temp src: Oral BP: 107/51 Pulse: 74 Heart Rate: 66 Resp: 14 SpO2: 90 % O2 Device: None (Room air) Oxygen Delivery: 2 LPM  Vitals:    11/19/18 0748   Weight: 81.6 kg (180 lb)     Vital Signs with Ranges  Temp:  [97  F (36.1  C)-98.3  F (36.8  C)] 98  F (36.7  C)  Pulse:  [66-78] 74  Heart Rate:  [62-84] 66  Resp:  [8-16] 14  BP: (107-165)/(51-85) 107/51  SpO2:  [90 %-99 %] 90 %  I/O last 3 completed shifts:  In: 960 [P.O.:160; I.V.:800]  Out: 1300 [Urine:1200; Blood:100]    Constitutional: Well developed, well nourished[PB1.1] male[PB1.2] in no acute distress  Respiratory: Lungs clear to ausculation bilaterally without crackles or wheezes, no tachypnea  Cardiovascular: regular rate and rhythm, normal S1/S2 without murmur, rubs or gallops  GI: abdomen soft, normal bowel sounds  Skin/Integumen:    Other:  alert and appropriate, cranial nerves[PB1.1] II-XII[PB1.2] intact[PB1.1], mild difficulty with finger-nose testing, 5/5 strength upper extremities[PB1.2]    Medications     sodium chloride 100 mL/hr at  11/20/18 0840       acetaminophen  975 mg Oral Q8H     enoxaparin  30 mg Subcutaneous Q24H     gabapentin  1,500 mg Oral At Bedtime     ondansetron  4 mg Intravenous Q6H     ranitidine  150 mg Oral At Bedtime     senna-docusate  1 tablet Oral BID    Or     senna-docusate  2 tablet Oral BID     sodium chloride (PF)  3 mL Intracatheter Q8H       Data     Recent Labs  Lab 11/20/18  0634 11/19/18  0746   WBC  --  9.5   HGB 11.4* 12.8*   MCV  --  97   PLT  --  214   NA  --  145*   POTASSIUM  --  4.6   CHLORIDE  --  113*   CO2  --  26   BUN  --  41*   CR 2.00* 2.15*   ANIONGAP  --  6   AGNIESZKA  --  8.0*   * 111*       Recent Results (from the past 24 hour(s))   XR Surgery IVANIA L/T 5 Min Fluoro w Stills    Narrative    XR SURGERY IVANIA FLUORO LESS THAN 5 MIN W STILLS 11/19/2018 4:40 PM     COMPARISON: Radiographs earlier on the same day.    HISTORY: ORIF Left Femur    NUMBER OF IMAGES ACQUIRED: 4    VIEWS: 4    FLUOROSCOPY TIME: .7      Impression    IMPRESSION: Intraoperative imaging during LEFT femur open reduction  and internal fixation. Hardware appears intact.    ORAL SNYDER MD[PB1.1]          Revision History        User Key Date/Time User Provider Type Action    > PB1.2 11/20/2018  3:22 PM Brett Bryant MD Physician Sign     PB1.1 11/20/2018  2:46 PM Brett Bryant MD Physician             Progress Notes by Isaura Lynn PT at 11/20/2018 11:41 AM     Author:  Isaura Lynn PT Service:  (none) Author Type:  Physical Therapist    Filed:  11/20/2018 11:41 AM Date of Service:  11/20/2018 11:41 AM Creation Time:  11/20/2018 11:41 AM    Status:  Signed :  Isaura Lynn PT (Physical Therapist)            11/20/18 1126   Quick Adds   Type of Visit Initial PT Evaluation   Living Environment   Lives With spouse   Living Arrangements apartment   Living Environment Comment Pt lives with spouse in 3rd floor apartment with elevator access   Self-Care   Dominant Hand right   Usual Activity Tolerance moderate    Current Activity Tolerance fair   Regular Exercise no   Equipment Currently Used at Home none   Activity/Exercise/Self-Care Comment Pt reports he is independent without use of assistive device though it has been recommended to him to use a cane or walker [facility]   Functional Level Prior   Ambulation 0-->independent   Transferring 0-->independent   Fall history within last six months yes   Prior Functional Level Comment Pt denying other falls besides fall leading to this hospitalization   General Information   Onset of Illness/Injury or Date of Surgery - Date 11/19/18   Referring Physician Mingo Wang MD   Patient/Family Goals Statement To get better   Pertinent History of Current Problem (include personal factors and/or comorbidities that impact the POC) Pt is 89 year old male adm on 11/19/18 s/p fall at home resulting L hip intertrochanteric fracture. Pt underwent ORIF on 11/19/18. Pt with PMH including HTN, ca, CVA   Precautions/Limitations fall precautions   Weight-Bearing Status - LLE weight-bearing as tolerated   General Info Comments Activity: up with assist   Cognitive Status Examination   Orientation person;place   Level of Consciousness alert   Follows Commands and Answers Questions 75% of the time   Cognitive Comment Pt appearing slightly confused at times, needing repeated cueing or redirection.   Pain Assessment   Patient Currently in Pain No   Posture    Posture Forward head position;Protracted shoulders   Range of Motion (ROM)   ROM Comment B LE ROM WFL   Strength   Strength Comments R LE 3/5, L LE 3-/5   Bed Mobility   Bed Mobility Comments Mod assist of 2   Transfer Skills   Transfer Comments Min assist of 2   Gait   Gait Comments Min assist of 2 with FWW   Balance   Balance Comments Fair sitting, poor standing   Sensory Examination   Sensory Perception Comments Denies numbness/tingling   Clinical Impression   Criteria for Skilled  "Therapeutic Intervention yes, treatment indicated   PT Diagnosis Impaired functional independence   Influenced by the following impairments Decreased strength, decreased balance, decreased activity tolerancw   Functional limitations due to impairments Decreased safety with daily tasks   Clinical Presentation Stable/Uncomplicated   Clinical Presentation Rationale Current status, PMH   Clinical Decision Making (Complexity) Low complexity   Therapy Frequency` daily   Predicted Duration of Therapy Intervention (days/wks) 3 days   Anticipated Discharge Disposition Transitional Care Facility   Risk & Benefits of therapy have been explained Yes   Patient, Family & other staff in agreement with plan of care Yes   [facility] TM \"6 Clicks\"   2016, Trustees of [facility], under license to [employer]  All rights reserved.   6 Clicks Short Forms Basic Mobility Inpatient Short Form   [facility]  \"6 Clicks\" V.2 Basic Mobility Inpatient Short Form   1. Turning from your back to your side while in a flat bed without using bedrails? 2 - A Lot   2. Moving from lying on your back to sitting on the side of a flat bed without using bedrails? 2 - A Lot   3. Moving to and from a bed to a chair (including a wheelchair)? 2 - A Lot   4. Standing up from a chair using your arms (e.g., wheelchair, or bedside chair)? 2 - A Lot   5. To walk in hospital room? 2 - A Lot   6. Climbing 3-5 steps with a railing? 1 - Total   Basic Mobility Raw Score (Score out of 24.Lower scores equate to lower levels of function) 11   Total Evaluation Time   Total Evaluation Time (Minutes) 10[AD1.1]        Revision History        User Key Date/Time User Provider Type Action    > AD1.1 11/20/2018 11:41 AM Isaura Lynn, PT Physical Therapist Sign            Progress Notes by Arnulfo Ricardo PA-C at 11/20/2018  9:47 AM     Author:  Arnulfo Ricardo PA-C Service:  Orthopedics Author Type:  Physician Assistant    Filed:  " 11/20/2018  9:49 AM Date of Service:  11/20/2018  9:47 AM Creation Time:  11/20/2018  9:47 AM    Status:  Signed :  Arnulfo Ricardo PA-C (Physician Assistant)         [facility] Orthopedic Progress Note  Sourav Fine is a 89 year old male    Today's Date:11/20/2018  Admission Date: 11/19/2018  Abrasion of scalp, initial encounter [S00.01XA]  Closed nondisplaced intertrochanteric fracture of left femur, initial encounter (H) [S72.145A]  Syncope, unspecified syncope type [R55]  Acute renal failure superimposed on chronic kidney disease, unspecified CKD stage, unspecified acute renal failure type (H) [N17.9, N18.9]      POD # 1 Left Hip fracture with intramedullary nailing   HGB 11.4  AFVSS  Patient Seen.  Doing well.  Dressings are clean, dry, and intact.  Circulation, motor and sensory function, intact distally.      PLAN:  WBAT  PT/OT.  Posterior hip precautions.  MEDICINE:Please note lesion on left great toe nail.  Concern for possible melanoma??  Discharge plan: TCU when medically stable.      Temp:  [97  F (36.1  C)-98.3  F (36.8  C)] 98.1  F (36.7  C)  Pulse:  [65-78] 74  Heart Rate:  [62-84] 69  Resp:  [8-16] 14  BP: (109-165)/(59-85) 109/59  SpO2:  [95 %-99 %] 96 %      Results for orders placed or performed during the hospital encounter of 11/19/18 (from the past 24 hour(s))   Orthopedic Surgery IP Consult: Patient to be seen: Routine - within 24 hours; mechanical fall with minimally displaced intertrochanteric fracture of left hip; Consultant may enter orders: Yes    Narrative    Sheila King PA-C     11/19/2018  1:13 PM  [facility] Orthopedic Consultation    Sourav Fine MRN# [id number]   Age: 89 year old YOB: 1925     Date of Admission:  11/19/2018    Reason for consult: Left intertrochanteric femur fracture       Requesting physician: Jes Durham PAC       Level of consult: Consult, follow and place orders           Assessment and Plan:   Assessment:    Left intertrochanteric femur fracture      Plan:   ORIF of left intertrochanteric femur fracture today as OR time   permits  NPO  Bed rest  May require abdi - discuss with Sheila BRO  Pain medication as needed, avoid narcotics if possible  Hospitalist has optimized for surgery           Chief Complaint:   Left intertrochanteric femur fracture         History of Present Illness:   This patient is a 89 year old male who presents with the   following condition requiring a hospital admission:    Mr. Fine states that he got up to use the bathroom this AM at   about 6 am when he felt dizzy which resulted in a fall onto his   left side. He has not been able to ambulate since the fall. His   wife was able to creatively get him up in a rolling chair. He was   brought to [facility] ED via EMS. He has been NPO since last evening.   Takes ASA 81 mg daily.   Both his wife and himself live independently in a condo.           Past Medical History:     Past Medical History:   Diagnosis Date     Arthritis      Hypertension              Past Surgical History:     Past Surgical History:   Procedure Laterality Date     ORTHOPEDIC SURGERY               Social History:     Social History   Substance Use Topics     Smoking status: Former Smoker     Quit date: 1/1/1989     Smokeless tobacco: Never Used     Alcohol use Yes      Comment: a little wine daily             Family History:   No family history on file.          Immunizations:     VACCINE/DOSE   Diptheria   DPT   DTAP   HBIG   Hepatitis A   Hepatitis B   HIB   Influenza   Measles   Meningococcal   MMR   Mumps   Pneumococcal   Polio   Rubella   Small Pox   TDAP   Varicella   Zoster             Allergies:   No Known Allergies          Medications:     Current Facility-Administered Medications   Medication     acetaminophen (TYLENOL) Suppository 650 mg     acetaminophen (TYLENOL) tablet 975 mg     benztropine (COGENTIN) tablet 1-2 mg     bisacodyl (DULCOLAX) Suppository 10 mg     ceFAZolin  "(ANCEF) 1 g vial to attach to  ml bag for ADULT   or 50 ml bag for PEDS     ceFAZolin (ANCEF) intermittent infusion 2 g in 100 mL dextrose   PRE-MIX     dextrose 5% and 0.45% NaCl infusion     HYDROmorphone (PF) (DILAUDID) injection 0.3-0.5 mg     melatonin tablet 1 mg     naloxone (NARCAN) injection 0.1-0.4 mg     ondansetron (ZOFRAN-ODT) ODT tab 4 mg    Or     ondansetron (ZOFRAN) injection 4 mg     oxyCODONE IR (ROXICODONE) tablet 5-10 mg     polyethylene glycol (MIRALAX/GLYCOLAX) Packet 17 g     prochlorperazine (COMPAZINE) injection 5 mg    Or     prochlorperazine (COMPAZINE) tablet 5 mg    Or     prochlorperazine (COMPAZINE) Suppository 12.5 mg     senna-docusate (SENOKOT-S;PERICOLACE) 8.6-50 MG per tablet 1   tablet    Or     senna-docusate (SENOKOT-S;PERICOLACE) 8.6-50 MG per tablet 2   tablet             Review of Systems:   CV: NEGATIVE for chest pain, palpitations or peripheral edema  C: NEGATIVE for fever, chills, change in weight  E/M: NEGATIVE for ear, mouth and throat problems  R: NEGATIVE for significant cough or SOB          Physical Exam:   All vitals have been reviewed  Patient Vitals for the past 24 hrs:   BP Temp Temp src Pulse Heart Rate Resp SpO2 Height Weight   11/19/18 1035 134/75 98  F (36.7  C) Oral 65 - 16 96 % - -   11/19/18 1004 - - - - - 16 - - -   11/19/18 1000 125/80 - - - 65 16 96 % - -   11/19/18 0930 140/72 - - - 62 11 95 % - -   11/19/18 0900 - - - - 59 11 95 % - -   11/19/18 0748 169/82 98  F (36.7  C) Oral - 63 16 97 % 1.778 m   (5' 10\") 81.6 kg (180 lb)     No intake or output data in the 24 hours ending 11/19/18 1308    Skin intact over left hip with no abrasions or lacerasions   Minimal erythema of the surrounding skin.   Bilateral calves are soft, non-tender.  Bilateral lower extremity is NVI.  Sensation intact bilateral lower extremities  Active dorsi and plantar flexion bilaterally  +Dp pulse            Data:   All laboratory data reviewed  Results for orders placed " or performed during the hospital   encounter of 11/19/18   Head CT w/o contrast    Narrative    CT SCAN OF THE HEAD WITHOUT CONTRAST   11/19/2018 8:48 AM     HISTORY:  Head pain, fall.     TECHNIQUE:  Axial images of the head and coronal reformations   without  IV contrast material. Radiation dose for this scan was reduced   using  automated exposure control, adjustment of the mA and/or kV   according  to patient size, or iterative reconstruction technique.    COMPARISON: None.    FINDINGS:  Mild volume loss is present. Geographic   hypoattenuation and  volume loss within the left middle/inferior frontal gyrus is   present  consistent with old infarct. No evidence of acute ischemia,  hemorrhage, mass, mass effect, or hydrocephalus. The visualized  calvarium, skull base, paranasal sinuses, and extracranial soft  tissues are unremarkable. Bilateral lens replacements are   present.      Impression    IMPRESSION:  No acute intracranial abnormality. 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    PADDY LANDA MD   XR Pelvis w Hip Left 1 View    Narrative    PELVIS WITH LEFT HIP LATERAL TWO VIEWS  11/19/2018 8:35 AM     HISTORY: Left hip pain.     COMPARISON: None.      Impression    IMPRESSION:  Minimally displaced intertrochanteric fracture of   the  left hip. There are degenerative changes of both hips, left worse   than  right. Prostate seeds noted.    MALLORY BEAL MD   XR Chest 1 View    Narrative    CHEST ONE VIEW  11/19/2018 8:35 AM     HISTORY:  Trauma to left hip.     COMPARISON: 1/28/2006      Impression    IMPRESSION: Heart size upper normal but similar to prior.   Pulmonary  vascularity within normal limits. The lungs are clear. No   pneumothorax  or pleural effusion. There are lower left lateral rib fractures   of  uncertain chronicity.    MALLORY BEAL MD   CBC with platelets differential   Result Value Ref Range    WBC 9.5 4.0 - 11.0 10e9/L    RBC Count 4.14 (L) 4.4 - 5.9 10e12/L    Hemoglobin 12.8 (L) 13.3 - 17.7  g/dL    Hematocrit 40.0 40.0 - 53.0 %    MCV 97 78 - 100 fl    MCH 30.9 26.5 - 33.0 pg    MCHC 32.0 31.5 - 36.5 g/dL    RDW 14.0 10.0 - 15.0 %    Platelet Count 214 150 - 450 10e9/L    Diff Method Automated Method     % Neutrophils 67.5 %    % Lymphocytes 20.2 %    % Monocytes 9.4 %    % Eosinophils 2.5 %    % Basophils 0.2 %    % Immature Granulocytes 0.2 %    Absolute Neutrophil 6.4 1.6 - 8.3 10e9/L    Absolute Lymphocytes 1.9 0.8 - 5.3 10e9/L    Absolute Monocytes 0.9 0.0 - 1.3 10e9/L    Absolute Eosinophils 0.2 0.0 - 0.7 10e9/L    Absolute Basophils 0.0 0.0 - 0.2 10e9/L    Abs Immature Granulocytes 0.0 0 - 0.4 10e9/L   Basic metabolic panel   Result Value Ref Range    Sodium 145 (H) 133 - 144 mmol/L    Potassium 4.6 3.4 - 5.3 mmol/L    Chloride 113 (H) 94 - 109 mmol/L    Carbon Dioxide 26 20 - 32 mmol/L    Anion Gap 6 3 - 14 mmol/L    Glucose 111 (H) 70 - 99 mg/dL    Urea Nitrogen 41 (H) 7 - 30 mg/dL    Creatinine 2.15 (H) 0.66 - 1.25 mg/dL    GFR Estimate 29 (L) >60 mL/min/1.7m2    GFR Estimate If Black 35 (L) >60 mL/min/1.7m2    Calcium 8.0 (L) 8.5 - 10.1 mg/dL   Glucose by meter   Result Value Ref Range    Glucose 92 70 - 99 mg/dL   EKG 12 lead   Result Value Ref Range    Interpretation ECG Click View Image link to view waveform and   result         Attestation:  I have reviewed today's vital signs, notes, medications, labs and   imaging with Dr. Wang.  Amount of time performed on this consult: 25 minutes.    Sheila King PA-C        ECHO COMPLETE WITH OPTISON    Narrative    [id number]  [id number]  [id number]  [id number]^GRASAVANAH^ZAHIDA^YANETH TOSCANO           [facility]  Echocardiography Laboratory  [address] [place] [id number]        Name: NOEMI MORALES  MRN: [id number]  : 11/15/1925  Study Date: 2018 03:09 PM  Age: 89 yrs  Gender: Male  Patient Location: [facility]  Reason For Study: Syncope  Ordering Physician: ZAHIDA COPPOLA  Referring Physician: ANAT  ZAHIDA  Performed By: [facility] Clemencia Finney     BSA: 2.0 m2  Height: 70 in  Weight: 180 lb  HR: 63  BP: 134/75 mmHg  _____________________________________________________________________________  __        Procedure  Complete Portable Echo Adult. Contrast Optison.  _____________________________________________________________________________  __        Interpretation Summary     The visual ejection fraction is estimated at 60-65%.  Normal left ventricular wall motion  The right ventricle is normal in size and function.  There is no pericardial effusion.  The IVC is normal in size and reactivity with respiration, suggesting normal  central venous pressure.  No hemodynamically significant valvular abnormalities on 2D or color flow  imaging. The study was technically difficult. There is no comparison study  available.  _____________________________________________________________________________  __        Left Ventricle  The left ventricle is normal in size. Proximal septal thickening is noted.  Left ventricular hypertrophy is noted by two-dimensional echocardiography. The  visual ejection fraction is estimated at 60-65%. Left ventricular diastolic  function is indeterminate. Diastolic Doppler findings (E/E' ratio and/or other  parameters) suggest left ventricular filling pressures are indeterminate.  Normal left ventricular wall motion.     Right Ventricle  The right ventricle is normal in size and function.     Atria  The left atrium is not well visualized. Right atrium not well visualized.     Mitral Valve  The mitral valve leaflets appear thickened, but open well. There is trace  mitral regurgitation. There is no mitral valve stenosis.        Tricuspid Valve  The tricuspid valve is not well visualized. Normal IVC (1.5-2.5cm) with >50%  respiratory collapse; right atrial pressure is estimated at 5-10mmHg. The  right ventricular systolic pressure is approximated at 28.3 mmHg plus the  right atrial pressure.     Aortic  Valve  There is moderate trileaflet aortic sclerosis. There is trace aortic  regurgitation. No aortic stenosis is present.     Pulmonic Valve  The pulmonic valve is not well visualized. There is no pulmonic valvular  regurgitation. Normal pulmonic valve velocity.     Vessels  Borderline aortic root dilatation. Normal size ascending aorta. The IVC is  normal in size and reactivity with respiration, suggesting normal central  venous pressure.     Pericardium  There is no pericardial effusion.        Rhythm  Sinus rhythm was noted.  _____________________________________________________________________________  __  MMode/2D Measurements & Calculations  IVSd: 1.2 cm     LVIDd: 4.2 cm  LVIDs: 2.9 cm  LVPWd: 1.3 cm  FS: 30.8 %  LV mass(C)d: 187.8 grams  LV mass(C)dI: 94.1 grams/m2  Ao root diam: 3.9 cm  LA dimension: 3.1 cm  asc Aorta Diam: 3.2 cm  LA/Ao: 0.79  LVOT diam: 2.3 cm  LVOT area: 4.1 cm2  RWT: 0.64        Doppler Measurements & Calculations  MV E max alisha: 40.7 cm/sec  MV A max alisha: 86.3 cm/sec  MV E/A: 0.47  MV dec time: 0.23 sec  TR max alisha: 266.0 cm/sec  TR max P.3 mmHg  E/E' av.8  Lateral E/e': 9.2  Medial E/e': 10.4              _____________________________________________________________________________  __        Report approved by: Carmen Skinner 2018 04:06 PM      XR Surgery IVANIA L/T 5 Min Fluoro w Stills    Narrative    XR SURGERY IVANIA FLUORO LESS THAN 5 MIN W STILLS 2018 4:40 PM     COMPARISON: Radiographs earlier on the same day.    HISTORY: ORIF Left Femur    NUMBER OF IMAGES ACQUIRED: 4    VIEWS: 4    FLUOROSCOPY TIME: .7      Impression    IMPRESSION: Intraoperative imaging during LEFT femur open reduction  and internal fixation. Hardware appears intact.    ORAL SNYDER MD   Hemoglobin   Result Value Ref Range    Hemoglobin 11.4 (L) 13.3 - 17.7 g/dL   Creatinine   Result Value Ref Range    Creatinine 2.00 (H) 0.66 - 1.25 mg/dL    GFR Estimate 31 (L) >60 mL/min/1.7m2     GFR Estimate If Black 38 (L) >60 mL/min/1.7m2   Glucose   Result Value Ref Range    Glucose 133 (H) 70 - 99 mg/dL         Arnulfo Ricardo[DW1.1]       Revision History        User Key Date/Time User Provider Type Action    > DW1.1 11/20/2018  9:49 AM Arnulfo Ricardo PA-C Physician Assistant Sign                  Procedure Notes     No notes of this type exist for this encounter.         Progress Notes - Therapies (Notes from 11/20/18 through 11/23/18)      Progress Notes by Isaura Lynn PT at 11/20/2018 11:41 AM     Author:  Isaura Lynn PT Service:  (none) Author Type:  Physical Therapist    Filed:  11/20/2018 11:41 AM Date of Service:  11/20/2018 11:41 AM Creation Time:  11/20/2018 11:41 AM    Status:  Signed :  Isaura Lynn PT (Physical Therapist)            11/20/18 1126   Quick Adds   Type of Visit Initial PT Evaluation   Living Environment   Lives With spouse   Living Arrangements apartment   Living Environment Comment Pt lives with spouse in 3rd floor apartment with elevator access   Self-Care   Dominant Hand right   Usual Activity Tolerance moderate   Current Activity Tolerance fair   Regular Exercise no   Equipment Currently Used at Home none   Activity/Exercise/Self-Care Comment Pt reports he is independent without use of assistive device though it has been recommended to him to use a cane or walker and when he goes to the [facility] he uses a wheelchair.   Functional Level Prior   Ambulation 0-->independent   Transferring 0-->independent   Fall history within last six months yes   Prior Functional Level Comment Pt denying other falls besides fall leading to this hospitalization   General Information   Onset of Illness/Injury or Date of Surgery - Date 11/19/18   Referring Physician Mingo Wang MD   Patient/Family Goals Statement To get better   Pertinent History of Current Problem (include personal factors and/or comorbidities that impact the POC) Pt is 89 year old  "male adm on 11/19/18 s/p fall at home resulting L hip intertrochanteric fracture. Pt underwent ORIF on 11/19/18. Pt with PMH including HTN, ca, CVA   Precautions/Limitations fall precautions   Weight-Bearing Status - LLE weight-bearing as tolerated   General Info Comments Activity: up with assist   Cognitive Status Examination   Orientation person;place   Level of Consciousness alert   Follows Commands and Answers Questions 75% of the time   Cognitive Comment Pt appearing slightly confused at times, needing repeated cueing or redirection.   Pain Assessment   Patient Currently in Pain No   Posture    Posture Forward head position;Protracted shoulders   Range of Motion (ROM)   ROM Comment B LE ROM WFL   Strength   Strength Comments R LE 3/5, L LE 3-/5   Bed Mobility   Bed Mobility Comments Mod assist of 2   Transfer Skills   Transfer Comments Min assist of 2   Gait   Gait Comments Min assist of 2 with FWW   Balance   Balance Comments Fair sitting, poor standing   Sensory Examination   Sensory Perception Comments Denies numbness/tingling   Clinical Impression   Criteria for Skilled Therapeutic Intervention yes, treatment indicated   PT Diagnosis Impaired functional independence   Influenced by the following impairments Decreased strength, decreased balance, decreased activity tolerancw   Functional limitations due to impairments Decreased safety with daily tasks   Clinical Presentation Stable/Uncomplicated   Clinical Presentation Rationale Current status, PMH   Clinical Decision Making (Complexity) Low complexity   Therapy Frequency` daily   Predicted Duration of Therapy Intervention (days/wks) 3 days   Anticipated Discharge Disposition Transitional Care Facility   Risk & Benefits of therapy have been explained Yes   Patient, Family & other staff in agreement with plan of care Yes   [facility] AM-PAC TM \"6 Clicks\"   2016, Trustees of [facility], under license to [employer].  All rights reserved.   6 " "Clicks Short Forms Basic Mobility Inpatient Short Form   [facility] AM-PAC  \"6 Clicks\" V.2 Basic Mobility Inpatient Short Form   1. Turning from your back to your side while in a flat bed without using bedrails? 2 - A Lot   2. Moving from lying on your back to sitting on the side of a flat bed without using bedrails? 2 - A Lot   3. Moving to and from a bed to a chair (including a wheelchair)? 2 - A Lot   4. Standing up from a chair using your arms (e.g., wheelchair, or bedside chair)? 2 - A Lot   5. To walk in hospital room? 2 - A Lot   6. Climbing 3-5 steps with a railing? 1 - Total   Basic Mobility Raw Score (Score out of 24.Lower scores equate to lower levels of function) 11   Total Evaluation Time   Total Evaluation Time (Minutes) 10[AD1.1]        Revision History        User Key Date/Time User Provider Type Action    > AD1.1 11/20/2018 11:41 AM Isaura Lynn, PT Physical Therapist Sign            "

## 2018-11-19 NOTE — PROGRESS NOTES
RECEIVING UNIT ED HANDOFF REVIEW    ED Nurse Handoff Report was reviewed by: Sheila Cross on November 19, 2018 at 9:07 AM

## 2018-11-19 NOTE — IP AVS SNAPSHOT
"[place] [address] [facility]: [phone]                                              INTERAGENCY TRANSFER FORM - PHYSICIAN ORDERS   2018                    Hospital Admission Date: 2018  NOEMI MORALES   : 11/15/1925  Sex: Male        Attending Provider: Mingo Wang MD     Allergies:  No Known Allergies    Infection:  None   Service:  HOSPITALIST    Ht:  1.778 m (5' 10\")   Wt:  81.6 kg (180 lb)   Admission Wt:  81.6 kg (180 lb)    BMI:  25.83 kg/m 2   BSA:  2.01 m 2            Patient PCP Information     Provider PCP Type    Nick Finney MD General      ED Clinical Impression     Diagnosis Description Comment Added By Time Added    Closed nondisplaced intertrochanteric fracture of left femur, initial encounter (H) [S72.145A] Closed nondisplaced intertrochanteric fracture of left femur, initial encounter (H) [S72.145A]  David Denton DO 2018  8:34 AM    Abrasion of scalp, initial encounter [S00.01XA] Abrasion of scalp, initial encounter [S00.01XA]  David Denton DO 2018  9:21 AM    Syncope, unspecified syncope type [R55] Syncope, unspecified syncope type [R55]  David Denton DO 2018  9:21 AM    Acute renal failure superimposed on chronic kidney disease, unspecified CKD stage, unspecified acute renal failure type (H) [N17.9, N18.9] Acute renal failure superimposed on chronic kidney disease, unspecified CKD stage, unspecified acute renal failure type (H) [N17.9, N18.9]  David Denton DO 2018  9:41 AM      Hospital Problems as of 2018              Priority Class Noted POA    Intertrochanteric fracture (H) Medium  2018 Yes    Fracture, intertrochanteric, left femur (H) Medium  2018 Yes      Non-Hospital Problems as of 2018              Priority Class Noted    Cervicalgia Medium  2006    Other nonallopathic lesion of cervical region Medium  2006    Essential hypertension " Medium  1/26/2011    Arthritis of knee Medium  6/27/2011    Neuropathy Medium  10/25/2011    Erectile dysfunction Medium  8/13/2012    Malignant neoplasm of skin Medium  5/20/2013    Low vitamin B12 level Medium  3/18/2014    ILIR (obstructive sleep apnea) Medium  5/5/2016    Chronic midline low back pain without sciatica Medium  6/7/2017    CVA (cerebral vascular accident) (H) Medium  8/29/2017    Bilateral nonexudative age-related macular degeneration Medium  10/30/2018    Malignant neoplasm of kidney excluding renal pelvis (H) Medium  11/19/2018    Malignant neoplasm of prostate (H) Medium  11/19/2018      Code Status History     Date Active Date Inactive Code Status Order ID Comments User Context    11/19/2018 10:30 AM 11/19/2018  6:48 PM DNR/DNI [id number]  Jes Durham PA-C Inpatient         Medication Review      START taking        Dose / Directions Comments    acetaminophen 325 MG tablet   Commonly known as:  TYLENOL        Dose:  650 mg   Take 2 tablets (650 mg) by mouth every 6 hours as needed for mild pain   Quantity:  40 tablet   Refills:  0        senna-docusate 8.6-50 MG per tablet   Commonly known as:  SENOKOT-S;PERICOLACE        Dose:  2 tablet   Take 2 tablets by mouth 2 times daily   Quantity:  30 tablet   Refills:  0        tamsulosin 0.4 MG capsule   Commonly known as:  FLOMAX   Used for:  Urinary retention        Dose:  0.4 mg   Start taking on:  11/24/2018   Take 1 capsule (0.4 mg) by mouth daily   Quantity:  60 capsule   Refills:  0        traMADol 50 MG tablet   Commonly known as:  ULTRAM        Dose:  50 mg   Take 1 tablet (50 mg) by mouth every 6 hours as needed for moderate pain   Quantity:  30 tablet   Refills:  0          CONTINUE these medications which may have CHANGED, or have new prescriptions. If we are uncertain of the size of tablets/capsules you have at home, strength may be listed as something that might have changed.        Dose / Directions Comments     amLODIPine 2.5 MG tablet   Commonly known as:  NORVASC   This may have changed:    - medication strength  - how much to take   Used for:  Benign essential hypertension        Dose:  2.5 mg   Take 1 tablet (2.5 mg) by mouth daily   Refills:  0    Hold if SBP<100       aspirin 325 MG EC tablet   Commonly known as:  ASA   This may have changed:    - medication strength  - how much to take        Dose:  325 mg   Take 1 tablet (325 mg) by mouth daily   Quantity:  45 tablet   Refills:  0          CONTINUE these medications which have NOT CHANGED        Dose / Directions Comments    cyanocobalamin 1000 MCG tablet   Commonly known as:  vitamin  B-12        Dose:  1000 mcg   Take 1,000 mcg by mouth daily   Refills:  0        gabapentin 300 MG capsule   Commonly known as:  NEURONTIN        Dose:  1500 mg   Take 1,500 mg by mouth At Bedtime   Refills:  0        loperamide 2 MG capsule   Commonly known as:  IMODIUM        Dose:  2 mg   Take 2 mg by mouth 2 times daily as needed for diarrhea   Refills:  0        PRESERVISION AREDS Tabs        Dose:  1 tablet   Take 1 tablet by mouth 2 times daily   Refills:  0                Summary of Visit     Reason for your hospital stay       Left intertrochanteric femur fracture open reduction and internal fixation             After Care     Activity - Up with assistive device           Additional Discharge Instructions       Routine care of Valencia catheter .  Valencia catheter to gravity.  Voiding trial in 4-5 days, if retains, replace a catheter and needs referral to urology.       Advance Diet as Tolerated       Follow this diet upon discharge: Orders Placed This Encounter      Advance Diet as Tolerated: Regular Diet Adult       Advance Diet as Tolerated       Follow this diet upon discharge: Orders Placed This Encounter         Advance Diet as Tolerated: Regular Diet Adult       Fall precautions           General info for SNF       Length of Stay Estimate: Short Term Care: Estimated # of  Days <30  Condition at Discharge: Improving  Level of care:skilled   Rehabilitation Potential: Good  Admission H&P remains valid and up-to-date: Yes  Recent Chemotherapy: N/A  Use Nursing Home Standing Orders: Yes       Mantoux instructions       Give two-step Mantoux (PPD) Per Facility Policy Yes       Weight bearing status       WBAT       Wound care       Site:   Left hip x2  Instructions:  Leave dressing intact if Aquacel and remove at POD #7-10, remove staples POD #14  Daily dressing changes with gauze and tape             Referrals     Occupational Therapy Adult Consult       Evaluate and treat as clinically indicated.    Reason:  Left intertrochanteric femur fracture open reduction and internal fixation       Physical Therapy Adult Consult       Evaluate and treat as clinically indicated.    Reason:  Left intertrochanteric femur fracture open reduction and internal fixation             Follow-Up Appointment Instructions     Future Labs/Procedures    Follow Up and recommended labs and tests     Comments:    Follow up with Dr. Wang in 2-3 weeks.  No follow up labs or test are needed. Call for appointment [phone]      Follow-Up Appointment Instructions     Follow Up and recommended labs and tests       Follow up with Dr. Wang in 2-3 weeks.  No follow up labs or test are needed. Call for appointment [phone]             Statement of Approval     Ordered          11/23/18 1125  I have reviewed and agree with all the recommendations and orders detailed in this document.  EFFECTIVE NOW     Approved and electronically signed by:  Hansel Limon MD

## 2018-11-19 NOTE — CONSULTS
[facility] Orthopedic Consultation    Sourav Fine MRN# [id number]   Age: 89 year old YOB: 1925     Date of Admission:  11/19/2018    Reason for consult: Left intertrochanteric femur fracture       Requesting physician: Jes DRAKE       Level of consult: Consult, follow and place orders           Assessment and Plan:   Assessment:   Left intertrochanteric femur fracture      Plan:   ORIF of left intertrochanteric femur fracture today as OR time permits  NPO  Bed rest  May require abdi - discuss with Sheila BRO  Pain medication as needed, avoid narcotics if possible  Hospitalist has optimized for surgery           Chief Complaint:   Left intertrochanteric femur fracture         History of Present Illness:   This patient is a 89 year old male who presents with the following condition requiring a hospital admission:    Mr. Fine states that he got up to use the bathroom this AM at about 6 am when he felt dizzy which resulted in a fall onto his left side. He has not been able to ambulate since the fall. His wife was able to creatively get him up in a rolling chair. He was brought to [facility] ED via EMS. He has been NPO since last evening. Takes ASA 81 mg daily.   Both his wife and himself live independently in a condo.           Past Medical History:     Past Medical History:   Diagnosis Date     Arthritis      Hypertension              Past Surgical History:     Past Surgical History:   Procedure Laterality Date     ORTHOPEDIC SURGERY               Social History:     Social History   Substance Use Topics     Smoking status: Former Smoker     Quit date: 1/1/1989     Smokeless tobacco: Never Used     Alcohol use Yes      Comment: a little wine daily             Family History:   No family history on file.          Immunizations:     VACCINE/DOSE   Diptheria   DPT   DTAP   HBIG   Hepatitis A   Hepatitis B   HIB   Influenza   Measles   Meningococcal   MMR   Mumps   Pneumococcal   Polio   Rubella  "  Small Pox   TDAP   Varicella   Zoster             Allergies:   No Known Allergies          Medications:     Current Facility-Administered Medications   Medication     acetaminophen (TYLENOL) Suppository 650 mg     acetaminophen (TYLENOL) tablet 975 mg     benztropine (COGENTIN) tablet 1-2 mg     bisacodyl (DULCOLAX) Suppository 10 mg     ceFAZolin (ANCEF) 1 g vial to attach to  ml bag for ADULT or 50 ml bag for PEDS     ceFAZolin (ANCEF) intermittent infusion 2 g in 100 mL dextrose PRE-MIX     dextrose 5% and 0.45% NaCl infusion     HYDROmorphone (PF) (DILAUDID) injection 0.3-0.5 mg     melatonin tablet 1 mg     naloxone (NARCAN) injection 0.1-0.4 mg     ondansetron (ZOFRAN-ODT) ODT tab 4 mg    Or     ondansetron (ZOFRAN) injection 4 mg     oxyCODONE IR (ROXICODONE) tablet 5-10 mg     polyethylene glycol (MIRALAX/GLYCOLAX) Packet 17 g     prochlorperazine (COMPAZINE) injection 5 mg    Or     prochlorperazine (COMPAZINE) tablet 5 mg    Or     prochlorperazine (COMPAZINE) Suppository 12.5 mg     senna-docusate (SENOKOT-S;PERICOLACE) 8.6-50 MG per tablet 1 tablet    Or     senna-docusate (SENOKOT-S;PERICOLACE) 8.6-50 MG per tablet 2 tablet             Review of Systems:   CV: NEGATIVE for chest pain, palpitations or peripheral edema  C: NEGATIVE for fever, chills, change in weight  E/M: NEGATIVE for ear, mouth and throat problems  R: NEGATIVE for significant cough or SOB          Physical Exam:   All vitals have been reviewed  Patient Vitals for the past 24 hrs:   BP Temp Temp src Pulse Heart Rate Resp SpO2 Height Weight   11/19/18 1035 134/75 98  F (36.7  C) Oral 65 - 16 96 % - -   11/19/18 1004 - - - - - 16 - - -   11/19/18 1000 125/80 - - - 65 16 96 % - -   11/19/18 0930 140/72 - - - 62 11 95 % - -   11/19/18 0900 - - - - 59 11 95 % - -   11/19/18 0748 169/82 98  F (36.7  C) Oral - 63 16 97 % 1.778 m (5' 10\") 81.6 kg (180 lb)     No intake or output data in the 24 hours ending 11/19/18 1308    Skin intact " over left hip with no abrasions or lacerasions   Minimal erythema of the surrounding skin.   Bilateral calves are soft, non-tender.  Bilateral lower extremity is NVI.  Sensation intact bilateral lower extremities  Active dorsi and plantar flexion bilaterally  +Dp pulse            Data:   All laboratory data reviewed  Results for orders placed or performed during the hospital encounter of 11/19/18   Head CT w/o contrast    Narrative    CT SCAN OF THE HEAD WITHOUT CONTRAST   11/19/2018 8:48 AM     HISTORY:  Head pain, fall.     TECHNIQUE:  Axial images of the head and coronal reformations without  IV contrast material. Radiation dose for this scan was reduced using  automated exposure control, adjustment of the mA and/or kV according  to patient size, or iterative reconstruction technique.    COMPARISON: None.    FINDINGS:  Mild volume loss is present. Geographic hypoattenuation and  volume loss within the left middle/inferior frontal gyrus is present  consistent with old infarct. No evidence of acute ischemia,  hemorrhage, mass, mass effect, or hydrocephalus. The visualized  calvarium, skull base, paranasal sinuses, and extracranial soft  tissues are unremarkable. Bilateral lens replacements are present.      Impression    IMPRESSION:  No acute intracranial abnormality. 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    PADDY LANDA MD   XR Pelvis w Hip Left 1 View    Narrative    PELVIS WITH LEFT HIP LATERAL TWO VIEWS  11/19/2018 8:35 AM     HISTORY: Left hip pain.     COMPARISON: None.      Impression    IMPRESSION:  Minimally displaced intertrochanteric fracture of the  left hip. There are degenerative changes of both hips, left worse than  right. Prostate seeds noted.    MALLORY BEAL MD   XR Chest 1 View    Narrative    CHEST ONE VIEW  11/19/2018 8:35 AM     HISTORY:  Trauma to left hip.     COMPARISON: 1/28/2006      Impression    IMPRESSION: Heart size upper normal but similar to prior. Pulmonary  vascularity within normal  limits. The lungs are clear. No pneumothorax  or pleural effusion. There are lower left lateral rib fractures of  uncertain chronicity.    MALLORY BEAL MD   CBC with platelets differential   Result Value Ref Range    WBC 9.5 4.0 - 11.0 10e9/L    RBC Count 4.14 (L) 4.4 - 5.9 10e12/L    Hemoglobin 12.8 (L) 13.3 - 17.7 g/dL    Hematocrit 40.0 40.0 - 53.0 %    MCV 97 78 - 100 fl    MCH 30.9 26.5 - 33.0 pg    MCHC 32.0 31.5 - 36.5 g/dL    RDW 14.0 10.0 - 15.0 %    Platelet Count 214 150 - 450 10e9/L    Diff Method Automated Method     % Neutrophils 67.5 %    % Lymphocytes 20.2 %    % Monocytes 9.4 %    % Eosinophils 2.5 %    % Basophils 0.2 %    % Immature Granulocytes 0.2 %    Absolute Neutrophil 6.4 1.6 - 8.3 10e9/L    Absolute Lymphocytes 1.9 0.8 - 5.3 10e9/L    Absolute Monocytes 0.9 0.0 - 1.3 10e9/L    Absolute Eosinophils 0.2 0.0 - 0.7 10e9/L    Absolute Basophils 0.0 0.0 - 0.2 10e9/L    Abs Immature Granulocytes 0.0 0 - 0.4 10e9/L   Basic metabolic panel   Result Value Ref Range    Sodium 145 (H) 133 - 144 mmol/L    Potassium 4.6 3.4 - 5.3 mmol/L    Chloride 113 (H) 94 - 109 mmol/L    Carbon Dioxide 26 20 - 32 mmol/L    Anion Gap 6 3 - 14 mmol/L    Glucose 111 (H) 70 - 99 mg/dL    Urea Nitrogen 41 (H) 7 - 30 mg/dL    Creatinine 2.15 (H) 0.66 - 1.25 mg/dL    GFR Estimate 29 (L) >60 mL/min/1.7m2    GFR Estimate If Black 35 (L) >60 mL/min/1.7m2    Calcium 8.0 (L) 8.5 - 10.1 mg/dL   Glucose by meter   Result Value Ref Range    Glucose 92 70 - 99 mg/dL   EKG 12 lead   Result Value Ref Range    Interpretation ECG Click View Image link to view waveform and result         Attestation:  I have reviewed today's vital signs, notes, medications, labs and imaging with Dr. Wang.  Amount of time performed on this consult: 25 minutes.    Sheila King PA-C

## 2018-11-20 ENCOUNTER — APPOINTMENT (OUTPATIENT)
Dept: GENERAL RADIOLOGY | Facility: CLINIC | Age: 83
DRG: 480 | End: 2018-11-20
Attending: INTERNAL MEDICINE
Payer: MEDICARE

## 2018-11-20 ENCOUNTER — APPOINTMENT (OUTPATIENT)
Dept: ULTRASOUND IMAGING | Facility: CLINIC | Age: 83
DRG: 480 | End: 2018-11-20
Attending: HOSPITALIST
Payer: MEDICARE

## 2018-11-20 ENCOUNTER — RESULTS ONLY (OUTPATIENT)
Facility: CLINIC | Age: 83
End: 2018-11-20

## 2018-11-20 ENCOUNTER — APPOINTMENT (OUTPATIENT)
Dept: PHYSICAL THERAPY | Facility: CLINIC | Age: 83
DRG: 480 | End: 2018-11-20
Attending: PHYSICIAN ASSISTANT
Payer: MEDICARE

## 2018-11-20 LAB
CREAT SERPL-MCNC: 2 MG/DL (ref 0.66–1.25)
GFR SERPL CREATININE-BSD FRML MDRD: 31 ML/MIN/1.7M2
GLUCOSE SERPL-MCNC: 133 MG/DL (ref 70–99)
HGB BLD-MCNC: 11.4 G/DL (ref 13.3–17.7)
INTERPRETATION ECG - MUSE: NORMAL

## 2018-11-20 PROCEDURE — 85018 HEMOGLOBIN: CPT | Performed by: ORTHOPAEDIC SURGERY

## 2018-11-20 PROCEDURE — 40000193 ZZH STATISTIC PT WARD VISIT

## 2018-11-20 PROCEDURE — 82565 ASSAY OF CREATININE: CPT | Performed by: ORTHOPAEDIC SURGERY

## 2018-11-20 PROCEDURE — 93005 ELECTROCARDIOGRAM TRACING: CPT

## 2018-11-20 PROCEDURE — A9270 NON-COVERED ITEM OR SERVICE: HCPCS | Performed by: PHYSICIAN ASSISTANT

## 2018-11-20 PROCEDURE — 25000132 ZZH RX MED GY IP 250 OP 250 PS 637: Performed by: ORTHOPAEDIC SURGERY

## 2018-11-20 PROCEDURE — 25000125 ZZHC RX 250: Performed by: HOSPITALIST

## 2018-11-20 PROCEDURE — 71046 X-RAY EXAM CHEST 2 VIEWS: CPT

## 2018-11-20 PROCEDURE — 25000132 ZZH RX MED GY IP 250 OP 250 PS 637: Performed by: PHYSICIAN ASSISTANT

## 2018-11-20 PROCEDURE — 82947 ASSAY GLUCOSE BLOOD QUANT: CPT | Performed by: ORTHOPAEDIC SURGERY

## 2018-11-20 PROCEDURE — 99232 SBSQ HOSP IP/OBS MODERATE 35: CPT | Performed by: HOSPITALIST

## 2018-11-20 PROCEDURE — 97530 THERAPEUTIC ACTIVITIES: CPT | Mod: GP

## 2018-11-20 PROCEDURE — 93010 ELECTROCARDIOGRAM REPORT: CPT | Performed by: INTERNAL MEDICINE

## 2018-11-20 PROCEDURE — 40000275 ZZH STATISTIC RCP TIME EA 10 MIN

## 2018-11-20 PROCEDURE — 36415 COLL VENOUS BLD VENIPUNCTURE: CPT | Performed by: ORTHOPAEDIC SURGERY

## 2018-11-20 PROCEDURE — 93880 EXTRACRANIAL BILAT STUDY: CPT

## 2018-11-20 PROCEDURE — 25000128 H RX IP 250 OP 636: Performed by: ORTHOPAEDIC SURGERY

## 2018-11-20 PROCEDURE — 25000128 H RX IP 250 OP 636: Performed by: HOSPITALIST

## 2018-11-20 PROCEDURE — 12000007 ZZH R&B INTERMEDIATE

## 2018-11-20 PROCEDURE — 97161 PT EVAL LOW COMPLEX 20 MIN: CPT | Mod: GP

## 2018-11-20 PROCEDURE — A9270 NON-COVERED ITEM OR SERVICE: HCPCS | Performed by: ORTHOPAEDIC SURGERY

## 2018-11-20 RX ORDER — TRAMADOL HYDROCHLORIDE 50 MG/1
50 TABLET ORAL EVERY 4 HOURS PRN
Status: DISCONTINUED | OUTPATIENT
Start: 2018-11-20 | End: 2018-11-23 | Stop reason: HOSPADM

## 2018-11-20 RX ORDER — ACETAMINOPHEN 325 MG/1
650 TABLET ORAL EVERY 6 HOURS PRN
Qty: 40 TABLET | Refills: 0 | Status: SHIPPED | OUTPATIENT
Start: 2018-11-20 | End: 2018-11-29

## 2018-11-20 RX ORDER — SODIUM CHLORIDE 9 MG/ML
INJECTION, SOLUTION INTRAVENOUS CONTINUOUS
Status: DISCONTINUED | OUTPATIENT
Start: 2018-11-20 | End: 2018-11-20

## 2018-11-20 RX ORDER — AMOXICILLIN 250 MG
2 CAPSULE ORAL 2 TIMES DAILY
Qty: 30 TABLET | Refills: 0 | Status: SHIPPED | OUTPATIENT
Start: 2018-11-20 | End: 2018-12-12

## 2018-11-20 RX ORDER — TRAMADOL HYDROCHLORIDE 50 MG/1
50 TABLET ORAL EVERY 6 HOURS PRN
Qty: 30 TABLET | Refills: 0 | Status: SHIPPED | OUTPATIENT
Start: 2018-11-20 | End: 2018-11-29

## 2018-11-20 RX ADMIN — ACETAMINOPHEN 975 MG: 325 TABLET, FILM COATED ORAL at 06:13

## 2018-11-20 RX ADMIN — ENOXAPARIN SODIUM 30 MG: 30 INJECTION SUBCUTANEOUS at 14:15

## 2018-11-20 RX ADMIN — OXYCODONE HYDROCHLORIDE 5 MG: 5 TABLET ORAL at 10:03

## 2018-11-20 RX ADMIN — RANITIDINE 150 MG: 150 TABLET ORAL at 22:03

## 2018-11-20 RX ADMIN — ACETAMINOPHEN 975 MG: 325 TABLET, FILM COATED ORAL at 14:15

## 2018-11-20 RX ADMIN — SODIUM CHLORIDE: 9 INJECTION, SOLUTION INTRAVENOUS at 08:40

## 2018-11-20 RX ADMIN — CEFAZOLIN SODIUM 2 G: 2 INJECTION, SOLUTION INTRAVENOUS at 03:06

## 2018-11-20 RX ADMIN — LIDOCAINE HYDROCHLORIDE 10 ML: 20 JELLY TOPICAL at 23:00

## 2018-11-20 RX ADMIN — ACETAMINOPHEN 975 MG: 325 TABLET, FILM COATED ORAL at 22:04

## 2018-11-20 RX ADMIN — GABAPENTIN 1500 MG: 400 CAPSULE ORAL at 22:03

## 2018-11-20 RX ADMIN — SENNOSIDES AND DOCUSATE SODIUM 2 TABLET: 8.6; 5 TABLET ORAL at 08:40

## 2018-11-20 RX ADMIN — ONDANSETRON 4 MG: 2 INJECTION INTRAMUSCULAR; INTRAVENOUS at 10:03

## 2018-11-20 RX ADMIN — SODIUM CHLORIDE: 9 INJECTION, SOLUTION INTRAVENOUS at 18:21

## 2018-11-20 RX ADMIN — SENNOSIDES AND DOCUSATE SODIUM 2 TABLET: 8.6; 5 TABLET ORAL at 22:04

## 2018-11-20 RX ADMIN — ONDANSETRON 4 MG: 2 INJECTION INTRAMUSCULAR; INTRAVENOUS at 03:06

## 2018-11-20 ASSESSMENT — ACTIVITIES OF DAILY LIVING (ADL)
ADLS_ACUITY_SCORE: 10
ADLS_ACUITY_SCORE: 9

## 2018-11-20 NOTE — ANESTHESIA POSTPROCEDURE EVALUATION
Patient: Sourav Fine    Procedure(s):  OPEN REDUCTION INTERNAL FIXATION LEFT HIP NAILING.    Diagnosis:INTERTROCHANTERIC HIP FRACTURE.  Diagnosis Additional Information: No value filed.    Anesthesia Type:  General, ETT    Note:  Anesthesia Post Evaluation    Patient location during evaluation: PACU  Patient participation: Able to fully participate in evaluation  Level of consciousness: awake and alert  Pain management: adequate  Airway patency: patent  Cardiovascular status: acceptable  Respiratory status: acceptable and unassisted  Hydration status: acceptable  PONV: none             Last vitals:  Vitals:    11/19/18 1800 11/19/18 1816 11/19/18 1840   BP: 145/72  140/73   Pulse:   66   Resp: 10  10   Temp: 36.8  C (98.2  F)  36.4  C (97.5  F)   SpO2: 95% 98% 96%         Electronically Signed By: Jhonathan Marques MD  November 19, 2018  6:46 PM

## 2018-11-20 NOTE — OP NOTE
Procedure Date: 11/19/2018      This consult was asked for by Dr. Jes Durham.      PREOPERATIVE DIAGNOSIS:  Left intertrochanteric femur fracture.      HISTORY OF PRESENT ILLNESS:  Mr. Fine is a very pleasant 89-year-old gentleman who was living at home with his wife.  At 6:00 a.m. he got of this morning, went to the bathroom.  He became dizzy and unsteady, causing him to fall.  He bumped his head and landed on his left hip.  He denies any loss of consciousness.  He was taken to [facility] and found to have a left intertrochanteric femur fracture and a left head laceration, which was where the bleeding was controlled and the patient was admitted for definitive care.      PAST MEDICAL AND SURGICAL HISTORY:  Significant for arthritis, malignant neoplasm of the prostate, bilateral not excessive age-related macular degeneration, malignant neoplasm of the kidney excluding the renal pelvis, hypertension, cervicalgia, neuropathy, erectile dysfunction,  cervical lesion, obstructive sleep apnea, malignant neoplasm of the skin, low vitamin B12 level, CVA, and chronic midline low back pain.  He has had previous orthopedic surgery.      MEDICATIONS ON ADMISSION:  Norvasc, aspirin, Colestid, vitamin B12, Neurontin, PreserVision.        ALLERGIES:  NO KNOWN DRUG ALLERGIES.      FAMILY HISTORY:  Noncontributory.      SOCIAL HISTORY:  , lives with his wife and ambulates independently.      REVIEW OF SYSTEMS:  A 10-point review of systems is positive for left hip pain in a left posterior occipital wound.      PHYSICAL EXAMINATION:  On physical examination, lying in his hospital bed in no apparent distress.  Alert and oriented.  Very conversive, very pleasant.  He is afebrile.  Vital signs are stable.  He has an abrasion on the left posterior skull, but otherwise no obvious other head trauma.  Cranial nerves are grossly intact.  Right and left upper extremities are within normal limits.  Skin is clean,  dry, and intact.  Palpable radial pulse.  Radial, ulnar, and median nerve sensation and function intact bilaterally.  Right and left lower extremity skin is clean, dry, and intact.  Palpable dorsalis pedis pulses.  EHL, FHL, tibial, gastrocsoleus all fire.  Superficial deep peroneus, saphenous, tibial and sural nerves and sensation is intact bilaterally.  He is tender to palpation around his left hip and left leg is somewhat shortened and externally rotated.  The right lower extremity is no tenderness to palpation or crepitus.      IMAGING STUDIES:  X-rays reviewed.  AP pelvis and left lateral hip shows a left intertrochanteric femur fracture.      LABORATORY DATA:  On admission:  Sodium 145, potassium 4.6, creatinine 2.15.  White blood cell count 9.5, hemoglobin 12.8.      IMPRESSION/REPORT/PLAN:  I had a long discussion with Mr. Fine regarding his left intertrochanteric femur fracture.  I think he would benefit from internal fixation with intramedullary hip screw for improved pain management and increased ambulation.  He and his wife are happy with this plan of care.  We will make arrangements for surgery as soon as he is medically stable.         NAVNEET TERRELL MD             D: 2018   T: 2018   MT: ALLYSON      Name:     NOEMI FINE   MRN:      [id number]        Account:        [id number]   :      11/15/1925           Procedure Date: 2018      Document: [id number]

## 2018-11-20 NOTE — PROGRESS NOTES
[facility]    Hospitalist Progress Note      Assessment & Plan   Sourav Fine is a 89 year old male who was admitted on 11/19/2018.  Past history of CVA, HTN, ILIR, CKD who presents with syncope with associated left hip fracture s/p ORIF 11/19.      Syncope  Etiology unclear, highest suspicion for hypovolemia given prodrome and elevated Na at admission.  Denied cardiorespiratory symptoms with non-ischemic EKG (trops not trended).  TTE 11/19 with EF 60-65% without WMA or significant valvular disease.  Head CT unremarkable for acute pathology, but does show known old left frontal infarct.   - tele negative for arrhythmia, will continue   - syncopal event today when up with PT (orthostatics not taken)  - check orthostatics qshift  -  ml/hr  - carotid US     Left intertrochanteric hip fracture s/p ORIF 11/19  Due to fall.  Procedure uncomplicated.    - post-op management per Ortho    Acute blood loss anemia  - hgb 12.8 > 11.4 post-op; monitor     HTN  Placed on Norvasc 5 mg daily 2 weeks ago, but stopped on own due to dizziness.  - pressures currently low-normal  - PRN Hydralazine available for SBP >180    ILIR  Pt declines CPAP at this time, available PRN      CKD IV  History of right radical nephrectomy  Baseline creatinine 2.1-2.2. Creatinine on admission 2.15.   - Cr stable 11/20      CVA  Left frontal lobe infarction in 5/2018. Head CT on admission showing an old left frontal infarct. No residual deficits.      Peripheral neuropathy   Vitamin B12 deficiency  - continue PTA gabapentin     Chronic diarrhea  Seen by GI in the outpatient setting. Hold antidiarrheal medication while on narcotics       DVT Prophylaxis: Enoxaparin (Lovenox) SQ  Code Status: Prior    Disposition: Expected discharge in 2 days once syncope work-up complete, cleared by Ortho.    Brett Bryant    Interval History   Pain is well controlled.  Does not recall syncopal event with PT.  He does not feel he lost consciousness.   Denies any chest pain/pressure, palpitations. No other complaints.    -Data reviewed today: I reviewed all new labs and imaging results over the last 24 hours. I personally reviewed no images or EKG's today.    Physical Exam   Temp: 98  F (36.7  C) Temp src: Oral BP: 107/51 Pulse: 74 Heart Rate: 66 Resp: 14 SpO2: 90 % O2 Device: None (Room air) Oxygen Delivery: 2 LPM  Vitals:    11/19/18 0748   Weight: 81.6 kg (180 lb)     Vital Signs with Ranges  Temp:  [97  F (36.1  C)-98.3  F (36.8  C)] 98  F (36.7  C)  Pulse:  [66-78] 74  Heart Rate:  [62-84] 66  Resp:  [8-16] 14  BP: (107-165)/(51-85) 107/51  SpO2:  [90 %-99 %] 90 %  I/O last 3 completed shifts:  In: 960 [P.O.:160; I.V.:800]  Out: 1300 [Urine:1200; Blood:100]    Constitutional: Well developed, well nourished male in no acute distress  Respiratory: Lungs clear to ausculation bilaterally without crackles or wheezes, no tachypnea  Cardiovascular: regular rate and rhythm, normal S1/S2 without murmur, rubs or gallops  GI: abdomen soft, normal bowel sounds  Skin/Integumen:    Other:  alert and appropriate, cranial nerves II-XII intact, mild difficulty with finger-nose testing, 5/5 strength upper extremities    Medications     sodium chloride 100 mL/hr at 11/20/18 0840       acetaminophen  975 mg Oral Q8H     enoxaparin  30 mg Subcutaneous Q24H     gabapentin  1,500 mg Oral At Bedtime     ondansetron  4 mg Intravenous Q6H     ranitidine  150 mg Oral At Bedtime     senna-docusate  1 tablet Oral BID    Or     senna-docusate  2 tablet Oral BID     sodium chloride (PF)  3 mL Intracatheter Q8H       Data     Recent Labs  Lab 11/20/18  0634 11/19/18  0746   WBC  --  9.5   HGB 11.4* 12.8*   MCV  --  97   PLT  --  214   NA  --  145*   POTASSIUM  --  4.6   CHLORIDE  --  113*   CO2  --  26   BUN  --  41*   CR 2.00* 2.15*   ANIONGAP  --  6   AGNIESZKA  --  8.0*   * 111*       Recent Results (from the past 24 hour(s))   XR Surgery IVANIA L/T 5 Min Fluoro w Meghan    Narrative     XR SURGERY IVANIA FLUORO LESS THAN 5 MIN W STILLS 11/19/2018 4:40 PM     COMPARISON: Radiographs earlier on the same day.    HISTORY: ORIF Left Femur    NUMBER OF IMAGES ACQUIRED: 4    VIEWS: 4    FLUOROSCOPY TIME: .7      Impression    IMPRESSION: Intraoperative imaging during LEFT femur open reduction  and internal fixation. Hardware appears intact.    ORAL SNYDER MD

## 2018-11-20 NOTE — PLAN OF CARE
Problem: Surgery Nonspecified (Adult)  Goal: Signs and Symptoms of Listed Potential Problems Will be Absent, Minimized or Managed (Surgery Nonspecified)  Signs and symptoms of listed potential problems will be absent, minimized or managed by discharge/transition of care (reference Surgery Nonspecified (Adult) CPG).  Outcome: No Change  Pt arrived on floor at 1830. Pt A/OX4, lethargic. VSS, O2 3L. Denies pain. CMS intact. Dressing c/d/i. Valencia patent. Laceration to head open to air, c/d/i.

## 2018-11-20 NOTE — PLAN OF CARE
Problem: Surgery Nonspecified (Adult)  Goal: Signs and Symptoms of Listed Potential Problems Will be Absent, Minimized or Managed (Surgery Nonspecified)  Signs and symptoms of listed potential problems will be absent, minimized or managed by discharge/transition of care (reference Surgery Nonspecified (Adult) CPG).   Outcome: Improving  Pt is alert, sometimes disoriented to time and place. VSS on 2 L of oxygen, denies pain. CMS intact and drsg C/D/I. Denied to dangle. IVF. Valencia catheter discontinued and is due to void. Will continue to monitor.

## 2018-11-20 NOTE — PROGRESS NOTES
[facility] [place] [facility] Progress Note  Sourav Fine is a 89 year old male    Today's Date:11/20/2018  Admission Date: 11/19/2018  Abrasion of scalp, initial encounter [S00.01XA]  Closed nondisplaced intertrochanteric fracture of left femur, initial encounter (H) [S72.145A]  Syncope, unspecified syncope type [R55]  Acute renal failure superimposed on chronic kidney disease, unspecified CKD stage, unspecified acute renal failure type (H) [N17.9, N18.9]      POD # 1 Left Hip fracture with intramedullary nailing   HGB 11.4  AFVSS  Patient Seen.  Doing well.  Dressings are clean, dry, and intact.  Circulation, motor and sensory function, intact distally.      PLAN:  WBAT  PT/OT.  Posterior hip precautions.  MEDICINE:Please note lesion on left great toe nail.  Concern for possible melanoma??  Discharge plan: TCU when medically stable.      Temp:  [97  F (36.1  C)-98.3  F (36.8  C)] 98.1  F (36.7  C)  Pulse:  [65-78] 74  Heart Rate:  [62-84] 69  Resp:  [8-16] 14  BP: (109-165)/(59-85) 109/59  SpO2:  [95 %-99 %] 96 %      Results for orders placed or performed during the hospital encounter of 11/19/18 (from the past 24 hour(s))   Orthopedic Surgery IP Consult: Patient to be seen: Routine - within 24 hours; mechanical fall with minimally displaced intertrochanteric fracture of left hip; Consultant may enter orders: Yes    Narrative    Sheila King PA-C     11/19/2018  1:13 PM  [facility] [place] [facility] Consultation    Sourav Fine MRN# [id number]   Age: 89 year old YOB: 1925     Date of Admission:  11/19/2018    Reason for consult: Left intertrochanteric femur fracture       Requesting physician: Jes DRAKE       Level of consult: Consult, follow and place orders           Assessment and Plan:   Assessment:   Left intertrochanteric femur fracture      Plan:   ORIF of left intertrochanteric femur fracture today as OR time   permits  NPO  Bed rest  May require abdi - discuss with Sheila  RN  Pain medication as needed, avoid narcotics if possible  Hospitalist has optimized for surgery           Chief Complaint:   Left intertrochanteric femur fracture         History of Present Illness:   This patient is a 89 year old male who presents with the   following condition requiring a hospital admission:    Mr. Fine states that he got up to use the bathroom this AM at   about 6 am when he felt dizzy which resulted in a fall onto his   left side. He has not been able to ambulate since the fall. His   wife was able to creatively get him up in a rolling chair. He was   brought to [facility] ED via EMS. He has been NPO since last evening.   Takes ASA 81 mg daily.   Both his wife and himself live independently in a condo.           Past Medical History:     Past Medical History:   Diagnosis Date     Arthritis      Hypertension              Past Surgical History:     Past Surgical History:   Procedure Laterality Date     ORTHOPEDIC SURGERY               Social History:     Social History   Substance Use Topics     Smoking status: Former Smoker     Quit date: 1/1/1989     Smokeless tobacco: Never Used     Alcohol use Yes      Comment: a little wine daily             Family History:   No family history on file.          Immunizations:     VACCINE/DOSE   Diptheria   DPT   DTAP   HBIG   Hepatitis A   Hepatitis B   HIB   Influenza   Measles   Meningococcal   MMR   Mumps   Pneumococcal   Polio   Rubella   Small Pox   TDAP   Varicella   Zoster             Allergies:   No Known Allergies          Medications:     Current Facility-Administered Medications   Medication     acetaminophen (TYLENOL) Suppository 650 mg     acetaminophen (TYLENOL) tablet 975 mg     benztropine (COGENTIN) tablet 1-2 mg     bisacodyl (DULCOLAX) Suppository 10 mg     ceFAZolin (ANCEF) 1 g vial to attach to  ml bag for ADULT   or 50 ml bag for PEDS     ceFAZolin (ANCEF) intermittent infusion 2 g in 100 mL dextrose   PRE-MIX     dextrose 5% and  "0.45% NaCl infusion     HYDROmorphone (PF) (DILAUDID) injection 0.3-0.5 mg     melatonin tablet 1 mg     naloxone (NARCAN) injection 0.1-0.4 mg     ondansetron (ZOFRAN-ODT) ODT tab 4 mg    Or     ondansetron (ZOFRAN) injection 4 mg     oxyCODONE IR (ROXICODONE) tablet 5-10 mg     polyethylene glycol (MIRALAX/GLYCOLAX) Packet 17 g     prochlorperazine (COMPAZINE) injection 5 mg    Or     prochlorperazine (COMPAZINE) tablet 5 mg    Or     prochlorperazine (COMPAZINE) Suppository 12.5 mg     senna-docusate (SENOKOT-S;PERICOLACE) 8.6-50 MG per tablet 1   tablet    Or     senna-docusate (SENOKOT-S;PERICOLACE) 8.6-50 MG per tablet 2   tablet             Review of Systems:   CV: NEGATIVE for chest pain, palpitations or peripheral edema  C: NEGATIVE for fever, chills, change in weight  E/M: NEGATIVE for ear, mouth and throat problems  R: NEGATIVE for significant cough or SOB          Physical Exam:   All vitals have been reviewed  Patient Vitals for the past 24 hrs:   BP Temp Temp src Pulse Heart Rate Resp SpO2 Height Weight   11/19/18 1035 134/75 98  F (36.7  C) Oral 65 - 16 96 % - -   11/19/18 1004 - - - - - 16 - - -   11/19/18 1000 125/80 - - - 65 16 96 % - -   11/19/18 0930 140/72 - - - 62 11 95 % - -   11/19/18 0900 - - - - 59 11 95 % - -   11/19/18 0748 169/82 98  F (36.7  C) Oral - 63 16 97 % 1.778 m   (5' 10\") 81.6 kg (180 lb)     No intake or output data in the 24 hours ending 11/19/18 1308    Skin intact over left hip with no abrasions or lacerasions   Minimal erythema of the surrounding skin.   Bilateral calves are soft, non-tender.  Bilateral lower extremity is NVI.  Sensation intact bilateral lower extremities  Active dorsi and plantar flexion bilaterally  +Dp pulse            Data:   All laboratory data reviewed  Results for orders placed or performed during the hospital   encounter of 11/19/18   Head CT w/o contrast    Narrative    CT SCAN OF THE HEAD WITHOUT CONTRAST   11/19/2018 8:48 AM     HISTORY:  Head " pain, fall.     TECHNIQUE:  Axial images of the head and coronal reformations   without  IV contrast material. Radiation dose for this scan was reduced   using  automated exposure control, adjustment of the mA and/or kV   according  to patient size, or iterative reconstruction technique.    COMPARISON: None.    FINDINGS:  Mild volume loss is present. Geographic   hypoattenuation and  volume loss within the left middle/inferior frontal gyrus is   present  consistent with old infarct. No evidence of acute ischemia,  hemorrhage, mass, mass effect, or hydrocephalus. The visualized  calvarium, skull base, paranasal sinuses, and extracranial soft  tissues are unremarkable. Bilateral lens replacements are   present.      Impression    IMPRESSION:  No acute intracranial abnormality. 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    PADDY LANDA MD   XR Pelvis w Hip Left 1 View    Narrative    PELVIS WITH LEFT HIP LATERAL TWO VIEWS  11/19/2018 8:35 AM     HISTORY: Left hip pain.     COMPARISON: None.      Impression    IMPRESSION:  Minimally displaced intertrochanteric fracture of   the  left hip. There are degenerative changes of both hips, left worse   than  right. Prostate seeds noted.    MALLORY BEAL MD   XR Chest 1 View    Narrative    CHEST ONE VIEW  11/19/2018 8:35 AM     HISTORY:  Trauma to left hip.     COMPARISON: 1/28/2006      Impression    IMPRESSION: Heart size upper normal but similar to prior.   Pulmonary  vascularity within normal limits. The lungs are clear. No   pneumothorax  or pleural effusion. There are lower left lateral rib fractures   of  uncertain chronicity.    MALLORY BEAL MD   CBC with platelets differential   Result Value Ref Range    WBC 9.5 4.0 - 11.0 10e9/L    RBC Count 4.14 (L) 4.4 - 5.9 10e12/L    Hemoglobin 12.8 (L) 13.3 - 17.7 g/dL    Hematocrit 40.0 40.0 - 53.0 %    MCV 97 78 - 100 fl    MCH 30.9 26.5 - 33.0 pg    MCHC 32.0 31.5 - 36.5 g/dL    RDW 14.0 10.0 - 15.0 %    Platelet Count 214 150 -  450 10e9/L    Diff Method Automated Method     % Neutrophils 67.5 %    % Lymphocytes 20.2 %    % Monocytes 9.4 %    % Eosinophils 2.5 %    % Basophils 0.2 %    % Immature Granulocytes 0.2 %    Absolute Neutrophil 6.4 1.6 - 8.3 10e9/L    Absolute Lymphocytes 1.9 0.8 - 5.3 10e9/L    Absolute Monocytes 0.9 0.0 - 1.3 10e9/L    Absolute Eosinophils 0.2 0.0 - 0.7 10e9/L    Absolute Basophils 0.0 0.0 - 0.2 10e9/L    Abs Immature Granulocytes 0.0 0 - 0.4 10e9/L   Basic metabolic panel   Result Value Ref Range    Sodium 145 (H) 133 - 144 mmol/L    Potassium 4.6 3.4 - 5.3 mmol/L    Chloride 113 (H) 94 - 109 mmol/L    Carbon Dioxide 26 20 - 32 mmol/L    Anion Gap 6 3 - 14 mmol/L    Glucose 111 (H) 70 - 99 mg/dL    Urea Nitrogen 41 (H) 7 - 30 mg/dL    Creatinine 2.15 (H) 0.66 - 1.25 mg/dL    GFR Estimate 29 (L) >60 mL/min/1.7m2    GFR Estimate If Black 35 (L) >60 mL/min/1.7m2    Calcium 8.0 (L) 8.5 - 10.1 mg/dL   Glucose by meter   Result Value Ref Range    Glucose 92 70 - 99 mg/dL   EKG 12 lead   Result Value Ref Range    Interpretation ECG Click View Image link to view waveform and   result         Attestation:  I have reviewed today's vital signs, notes, medications, labs and   imaging with Dr. Wang.  Amount of time performed on this consult: 25 minutes.    Sheila King PA-C        ECHO COMPLETE WITH OPTISON    Narrative    [id number]  [id number]  [id number]  [id number]^ANAT^ZAHIDA^YANETH TOSCANO           [facility]  Echocardiography Laboratory  [address] [place] [address]        Name: NOEMI MORALES  MRN: [id number]  : 11/15/1925  Study Date: 2018 03:09 PM  Age: 89 yrs  Gender: Male  Patient Location: [facility]  Reason For Study: Syncope  Ordering Physician: ZAHIDA COPPOLA  Referring Physician: ZAHIDA COPPOLA  Performed By: RDCS Clemencia Finney     BSA: 2.0 m2  Height: 70 in  Weight: 180 lb  HR: 63  BP: 134/75  mmHg  _____________________________________________________________________________  __        Procedure  Complete Portable Echo Adult. Contrast Optison.  _____________________________________________________________________________  __        Interpretation Summary     The visual ejection fraction is estimated at 60-65%.  Normal left ventricular wall motion  The right ventricle is normal in size and function.  There is no pericardial effusion.  The IVC is normal in size and reactivity with respiration, suggesting normal  central venous pressure.  No hemodynamically significant valvular abnormalities on 2D or color flow  imaging. The study was technically difficult. There is no comparison study  available.  _____________________________________________________________________________  __        Left Ventricle  The left ventricle is normal in size. Proximal septal thickening is noted.  Left ventricular hypertrophy is noted by two-dimensional echocardiography. The  visual ejection fraction is estimated at 60-65%. Left ventricular diastolic  function is indeterminate. Diastolic Doppler findings (E/E' ratio and/or other  parameters) suggest left ventricular filling pressures are indeterminate.  Normal left ventricular wall motion.     Right Ventricle  The right ventricle is normal in size and function.     Atria  The left atrium is not well visualized. Right atrium not well visualized.     Mitral Valve  The mitral valve leaflets appear thickened, but open well. There is trace  mitral regurgitation. There is no mitral valve stenosis.        Tricuspid Valve  The tricuspid valve is not well visualized. Normal IVC (1.5-2.5cm) with >50%  respiratory collapse; right atrial pressure is estimated at 5-10mmHg. The  right ventricular systolic pressure is approximated at 28.3 mmHg plus the  right atrial pressure.     Aortic Valve  There is moderate trileaflet aortic sclerosis. There is trace aortic  regurgitation. No aortic  stenosis is present.     Pulmonic Valve  The pulmonic valve is not well visualized. There is no pulmonic valvular  regurgitation. Normal pulmonic valve velocity.     Vessels  Borderline aortic root dilatation. Normal size ascending aorta. The IVC is  normal in size and reactivity with respiration, suggesting normal central  venous pressure.     Pericardium  There is no pericardial effusion.        Rhythm  Sinus rhythm was noted.  _____________________________________________________________________________  __  MMode/2D Measurements & Calculations  IVSd: 1.2 cm     LVIDd: 4.2 cm  LVIDs: 2.9 cm  LVPWd: 1.3 cm  FS: 30.8 %  LV mass(C)d: 187.8 grams  LV mass(C)dI: 94.1 grams/m2  Ao root diam: 3.9 cm  LA dimension: 3.1 cm  asc Aorta Diam: 3.2 cm  LA/Ao: 0.79  LVOT diam: 2.3 cm  LVOT area: 4.1 cm2  RWT: 0.64        Doppler Measurements & Calculations  MV E max alisha: 40.7 cm/sec  MV A max alisha: 86.3 cm/sec  MV E/A: 0.47  MV dec time: 0.23 sec  TR max alisha: 266.0 cm/sec  TR max P.3 mmHg  E/E' av.8  Lateral E/e': 9.2  Medial E/e': 10.4              _____________________________________________________________________________  __        Report approved by: Carmen Skinner 2018 04:06 PM      XR Surgery IVANIA L/T 5 Min Fluoro w Stills    Narrative    XR SURGERY IVANIA FLUORO LESS THAN 5 MIN W STILLS 2018 4:40 PM     COMPARISON: Radiographs earlier on the same day.    HISTORY: ORIF Left Femur    NUMBER OF IMAGES ACQUIRED: 4    VIEWS: 4    FLUOROSCOPY TIME: .7      Impression    IMPRESSION: Intraoperative imaging during LEFT femur open reduction  and internal fixation. Hardware appears intact.    ORAL SNYDER MD   Hemoglobin   Result Value Ref Range    Hemoglobin 11.4 (L) 13.3 - 17.7 g/dL   Creatinine   Result Value Ref Range    Creatinine 2.00 (H) 0.66 - 1.25 mg/dL    GFR Estimate 31 (L) >60 mL/min/1.7m2    GFR Estimate If Black 38 (L) >60 mL/min/1.7m2   Glucose   Result Value Ref Range    Glucose 133 (H) 70 -  99 mg/dL         Arnulfo Ricardo

## 2018-11-20 NOTE — PLAN OF CARE
Problem: Surgery Nonspecified (Adult)  Goal: Signs and Symptoms of Listed Potential Problems Will be Absent, Minimized or Managed (Surgery Nonspecified)  Signs and symptoms of listed potential problems will be absent, minimized or managed by discharge/transition of care (reference Surgery Nonspecified (Adult) CPG).   Outcome: No Change  Pt A/Ox3, forgetful at times. VSS. Up 2 assist w/ walker. Reports pain 1/10 using scheduled tylenol and oxycodone with relief. Regular diet tolerated. CMS intact. Dressing c/d/i. Valencia removed at 0700, bladder scanned at 1430 for 132cc, encourage fluids and new orders placed after Dr. Bryant updated.

## 2018-11-20 NOTE — PLAN OF CARE
Problem: Patient Care Overview  Goal: Plan of Care/Patient Progress Review  Discharge Planner PT   Patient plan for discharge: None stated at this time  Current status: Pt is 89 year old male adm on 11/19/18 s/p fall at home resulting in L hip intertrochanteric fracture, went to OR 11/19/18 for ORIF. At baseline, pt lives with spouse in apartment with elevator access, does not use assistive device though does have a history of falls. PT orders received, chart reviewed, evaluation completed and treatment initiated. Pt mod assist of 2 for bed mobility, min assist of 2 for sit to stand transfers, able to participate in pre-gait activities at EOB with FWW and min assist of 2. Pt transfer bed to wheelchair with FWW and min assist of 2, however becoming unresponsive after transfer and dependently transferred back to bed where pt became more responsive once supine. RN aware.  Barriers to return to prior living situation: Current level of assist, decreased activity tolerance, decreased strength, decreased balance, fall risk  Recommendations for discharge: TCU  Rationale for recommendations: Anticipate need for continued PT intervention at TCU to maximize overall function, safety, and mobility prior to return home.       Entered by: Isaura Lynn 11/20/2018 11:50 AM

## 2018-11-20 NOTE — OP NOTE
Procedure Date: 11/19/2018      PREOPERATIVE DIAGNOSIS:  Left intertrochanteric femur fracture.      POSTOPERATIVE DIAGNOSIS:  Left intertrochanteric femur fracture.      PROCEDURE:  Internal fixation of a left intertrochanteric femur fracture.      SURGEON:  Mingo Wang MD      FIRST ASSISTANT:  Doretha Hernandez PA-C      INDICATIONS FOR THE SURGERY:  Mr. Fine is a very pleasant 89-year-old gentleman who fell in his bathroom earlier today, suffering a left intertrochanteric femur fracture.  He was taken to [facility], where he was diagnosed with this fracture and admitted for definitive care.  We had a long discussion of treatment options.  Given his age, activity level, and nature of this injury, I think he would benefit from internal fixation.  He and his wife understand this and wished to proceed with surgery.      NARRATIVE EVENTS:  After proper identification of the patient to be operated on, Mr. Fine was taken to the operating room where he underwent a general anesthetic, placed supine on the fracture table.  The left hip was prepped and draped in the usual sterile manner.  After appropriate surgical pause confirming the patient to be operated on and after the patient's hip fracture had been reduced under biplanar fluoroscopy we approached his left hip through a lateral incision centered in line with the femur, just proximal to greater trochanter.  Skin and soft tissue were resected down to the tensor fascia.  The fascia was then split in line with the fibers and in line with the femur, allowing us to access down onto the tip of the greater trochanter.  We placed our initial entry pin, which was placed in the tip of the greater trochanter in AP view, in line with the femoral shaft on the lateral view of the femur.  We then overreamed this with the initial entry reamer and then passed our guidewire down the femur.  We measured the femur and it measured to fit a size 400 mm to the  Synthes trochanteric femoral nail with a 130 lag screw angle.  We over reamed the guidewire to 13 mm, placed an 11 mm diameter x 400 mm in length trochanteric femoral nail with a 130 leg angle.  We passed the sheryl into position.  We made a small nick incision laterally along the femur.  We placed our guidewire for our lag screw, such that it was centered in the femoral head in both AP and lateral views of the femur.  We measured and placed a 95 mm in length lag screw into position, which was solidly into position in the subchondral bone of the femoral head.  We then locked this with a compression screw and compressed the fracture.  At this point, we removed the aiming arm, confirmed that we had an anatomic reduction, and appropriate hardware placement.  We thoroughly irrigated the wounds, closed in layers with absorbable sutures, and the patient was placed in a well-padded postoperative dressing, taken to the recovery room in stable condition.  He tolerated the procedure well without difficulty.         NAVNEET TERRELL MD             D: 2018   T: 2018   MT: ALLYSON      Name:     NOEMI MORALES   MRN:      [id number]        Account:        [id number]   :      11/15/1925           Procedure Date: 2018      Document: [id number]

## 2018-11-20 NOTE — PROGRESS NOTES
11/20/18 1126   Quick Adds   Type of Visit Initial PT Evaluation   Living Environment   Lives With spouse   Living Arrangements apartment   Living Environment Comment Pt lives with spouse in 3rd floor apartment with elevator access   Self-Care   Dominant Hand right   Usual Activity Tolerance moderate   Current Activity Tolerance fair   Regular Exercise no   Equipment Currently Used at Home none   Activity/Exercise/Self-Care Comment Pt reports he is independent without use of assistive device though it has been recommended to him to use a cane or walker and [facility]   Functional Level Prior   Ambulation 0-->independent   Transferring 0-->independent   Fall history within last six months yes   Prior Functional Level Comment Pt denying other falls besides fall leading to this hospitalization   General Information   Onset of Illness/Injury or Date of Surgery - Date 11/19/18   Referring Physician Mingo Wang MD   Patient/Family Goals Statement To get better   Pertinent History of Current Problem (include personal factors and/or comorbidities that impact the POC) Pt is 89 year old male adm on 11/19/18 s/p fall at home resulting L hip intertrochanteric fracture. Pt underwent ORIF on 11/19/18. Pt with PMH including HTN, ca, CVA   Precautions/Limitations fall precautions   Weight-Bearing Status - LLE weight-bearing as tolerated   General Info Comments Activity: up with assist   Cognitive Status Examination   Orientation person;place   Level of Consciousness alert   Follows Commands and Answers Questions 75% of the time   Cognitive Comment Pt appearing slightly confused at times, needing repeated cueing or redirection.   Pain Assessment   Patient Currently in Pain No   Posture    Posture Forward head position;Protracted shoulders   Range of Motion (ROM)   ROM Comment B LE ROM WFL   Strength   Strength Comments R LE 3/5, L LE 3-/5   Bed Mobility   Bed Mobility  "Comments Mod assist of 2   Transfer Skills   Transfer Comments Min assist of 2   Gait   Gait Comments Min assist of 2 with FWW   Balance   Balance Comments Fair sitting, poor standing   Sensory Examination   Sensory Perception Comments Denies numbness/tingling   Clinical Impression   Criteria for Skilled Therapeutic Intervention yes, treatment indicated   PT Diagnosis Impaired functional independence   Influenced by the following impairments Decreased strength, decreased balance, decreased activity tolerancw   Functional limitations due to impairments Decreased safety with daily tasks   Clinical Presentation Stable/Uncomplicated   Clinical Presentation Rationale Current status, [facility]   Clinical Decision Making (Complexity) Low complexity   Therapy Frequency` daily   Predicted Duration of Therapy Intervention (days/wks) 3 days   Anticipated Discharge Disposition Transitional Care Facility   Risk & Benefits of therapy have been explained Yes   Patient, Family & other staff in agreement with plan of care Yes   [facility] [employer] TM \"6 Clicks\"   2016, Trustees of [facility], under license to [employer].  All rights reserved.   6 Clicks Short Forms Basic Mobility Inpatient Short Form   [facility] AM-PAC  \"6 Clicks\" V.2 Basic Mobility Inpatient Short Form   1. Turning from your back to your side while in a flat bed without using bedrails? 2 - A Lot   2. Moving from lying on your back to sitting on the side of a flat bed without using bedrails? 2 - A Lot   3. Moving to and from a bed to a chair (including a wheelchair)? 2 - A Lot   4. Standing up from a chair using your arms (e.g., wheelchair, or bedside chair)? 2 - A Lot   5. To walk in hospital room? 2 - A Lot   6. Climbing 3-5 steps with a railing? 1 - Total   Basic Mobility Raw Score (Score out of 24.Lower scores equate to lower levels of function) 11   Total Evaluation Time   Total Evaluation Time (Minutes) 10     "

## 2018-11-21 ENCOUNTER — APPOINTMENT (OUTPATIENT)
Dept: PHYSICAL THERAPY | Facility: CLINIC | Age: 83
DRG: 480 | End: 2018-11-21
Payer: MEDICARE

## 2018-11-21 LAB
ANION GAP SERPL CALCULATED.3IONS-SCNC: 7 MMOL/L (ref 3–14)
BUN SERPL-MCNC: 44 MG/DL (ref 7–30)
CALCIUM SERPL-MCNC: 7.2 MG/DL (ref 8.5–10.1)
CHLORIDE SERPL-SCNC: 114 MMOL/L (ref 94–109)
CO2 SERPL-SCNC: 23 MMOL/L (ref 20–32)
CREAT SERPL-MCNC: 2.38 MG/DL (ref 0.66–1.25)
GFR SERPL CREATININE-BSD FRML MDRD: 26 ML/MIN/1.7M2
GLUCOSE SERPL-MCNC: 121 MG/DL (ref 70–99)
HGB BLD-MCNC: 10.4 G/DL (ref 13.3–17.7)
POTASSIUM SERPL-SCNC: 4.5 MMOL/L (ref 3.4–5.3)
SODIUM SERPL-SCNC: 144 MMOL/L (ref 133–144)

## 2018-11-21 PROCEDURE — 25000132 ZZH RX MED GY IP 250 OP 250 PS 637: Performed by: HOSPITALIST

## 2018-11-21 PROCEDURE — 25000132 ZZH RX MED GY IP 250 OP 250 PS 637: Performed by: PHYSICIAN ASSISTANT

## 2018-11-21 PROCEDURE — A9270 NON-COVERED ITEM OR SERVICE: HCPCS | Performed by: PHYSICIAN ASSISTANT

## 2018-11-21 PROCEDURE — A9270 NON-COVERED ITEM OR SERVICE: HCPCS | Performed by: ORTHOPAEDIC SURGERY

## 2018-11-21 PROCEDURE — 80048 BASIC METABOLIC PNL TOTAL CA: CPT | Performed by: ORTHOPAEDIC SURGERY

## 2018-11-21 PROCEDURE — 99233 SBSQ HOSP IP/OBS HIGH 50: CPT | Performed by: HOSPITALIST

## 2018-11-21 PROCEDURE — 40000193 ZZH STATISTIC PT WARD VISIT: Performed by: PHYSICAL THERAPIST

## 2018-11-21 PROCEDURE — 25000132 ZZH RX MED GY IP 250 OP 250 PS 637: Performed by: ORTHOPAEDIC SURGERY

## 2018-11-21 PROCEDURE — 25000128 H RX IP 250 OP 636: Performed by: INTERNAL MEDICINE

## 2018-11-21 PROCEDURE — 85018 HEMOGLOBIN: CPT | Performed by: ORTHOPAEDIC SURGERY

## 2018-11-21 PROCEDURE — 12000007 ZZH R&B INTERMEDIATE

## 2018-11-21 PROCEDURE — 25000128 H RX IP 250 OP 636: Performed by: ORTHOPAEDIC SURGERY

## 2018-11-21 PROCEDURE — 97110 THERAPEUTIC EXERCISES: CPT | Mod: GP | Performed by: PHYSICAL THERAPIST

## 2018-11-21 PROCEDURE — 99207 ZZC CDG-MDM COMPONENT: MEETS MODERATE - UP CODED: CPT | Performed by: HOSPITALIST

## 2018-11-21 PROCEDURE — 36415 COLL VENOUS BLD VENIPUNCTURE: CPT | Performed by: ORTHOPAEDIC SURGERY

## 2018-11-21 RX ORDER — FUROSEMIDE 10 MG/ML
40 INJECTION INTRAMUSCULAR; INTRAVENOUS ONCE
Status: COMPLETED | OUTPATIENT
Start: 2018-11-21 | End: 2018-11-21

## 2018-11-21 RX ORDER — TAMSULOSIN HYDROCHLORIDE 0.4 MG/1
0.4 CAPSULE ORAL DAILY
Status: DISCONTINUED | OUTPATIENT
Start: 2018-11-21 | End: 2018-11-23 | Stop reason: HOSPADM

## 2018-11-21 RX ADMIN — ENOXAPARIN SODIUM 30 MG: 30 INJECTION SUBCUTANEOUS at 14:18

## 2018-11-21 RX ADMIN — GABAPENTIN 1500 MG: 400 CAPSULE ORAL at 21:46

## 2018-11-21 RX ADMIN — ACETAMINOPHEN 975 MG: 325 TABLET, FILM COATED ORAL at 14:17

## 2018-11-21 RX ADMIN — FUROSEMIDE 40 MG: 10 INJECTION, SOLUTION INTRAVENOUS at 01:35

## 2018-11-21 RX ADMIN — RANITIDINE 150 MG: 150 TABLET ORAL at 21:46

## 2018-11-21 RX ADMIN — TAMSULOSIN HYDROCHLORIDE 0.4 MG: 0.4 CAPSULE ORAL at 16:51

## 2018-11-21 RX ADMIN — ACETAMINOPHEN 975 MG: 325 TABLET, FILM COATED ORAL at 05:40

## 2018-11-21 RX ADMIN — OXYCODONE HYDROCHLORIDE 5 MG: 5 TABLET ORAL at 00:10

## 2018-11-21 RX ADMIN — ACETAMINOPHEN 975 MG: 325 TABLET, FILM COATED ORAL at 21:46

## 2018-11-21 RX ADMIN — SENNOSIDES AND DOCUSATE SODIUM 2 TABLET: 8.6; 5 TABLET ORAL at 21:46

## 2018-11-21 ASSESSMENT — ACTIVITIES OF DAILY LIVING (ADL)
ADLS_ACUITY_SCORE: 10

## 2018-11-21 NOTE — PLAN OF CARE
Problem: Patient Care Overview  Goal: Plan of Care/Patient Progress Review  Discharge Planner OT   Patient plan for discharge: Unknown.  Current status: Order rec'd. Patient has been evaluated by PT who recommends TCU.  Barriers to return to prior living situation: Defer to PT.  Recommendations for discharge: Defer to PT.  Rationale for recommendations: OT deferring eval to TCU at this time. Discharge planned as early as tomorrow.       Entered by: Karin Grider 11/21/2018 11:33 AM

## 2018-11-21 NOTE — PROGRESS NOTES
Orthopedic Surgery  Sourav Fine  2018  Admit Date:  2018  POD # 2  S/P Left intertrochanteric femur fracture ORIF    Patient resting comfortably in bed.  Sleeping soundly.  Pain controlled.  Tolerating oral intake.    Denies nausea or vomiting  Denies chest pain or shortness of breath  No events overnight.     Alert and orient to person, place, and time.  Vital Sign Ranges  Temperature Temp  Av.1  F (36.7  C)  Min: 97.2  F (36.2  C)  Max: 98.7  F (37.1  C)   Blood pressure Systolic (24hrs), Av , Min:113 , Max:134        Diastolic (24hrs), Av, Min:54, Max:64      Pulse Pulse  Av  Min: 76  Max: 76   Respirations Resp  Av  Min: 14  Max: 24   Pulse oximetry SpO2  Av.6 %  Min: 78 %  Max: 95 %       Dressing is clean, dry, and intact.   Minimal erythema of the surrounding skin.   Bilateral calves are soft, non-tender.  Bilateral lower extremity is NVI.  Sensation intact bilateral lower extremities  Active dorsi and plantar flexion bilaterally  +Dp pulse    Labs:  Recent Labs   Lab Test  18   0736  18   0746   POTASSIUM  4.5  4.6     Recent Labs   Lab Test  18   0736  18   0634  18   0746   HGB  10.4*  11.4*  12.8*     No results for input(s): INR in the last 71059 hours.  Recent Labs   Lab Test  18   0746   PLT  214       A/P  1. Plan   Continue Lovenox for DVT prophylaxis.  ASA on discharge   Mobilize with PT/OT    WBAT.     Continue current pain regiment.   Leave dressing intact    2. Disposition   Anticipate d/c to TCU based on medical clearance and progress with PT.    Sheila King PA-C

## 2018-11-21 NOTE — CONSULTS
Consult Date:  11/19/2018      Revised      This consult was asked for by Dr. Jes Durham.         PREOPERATIVE DIAGNOSIS:  Left intertrochanteric femur fracture.         HISTORY OF PRESENT ILLNESS:  Mr. Fine is a very pleasant 89-year-old gentleman who was living at home with his wife.  At 6:00 a.m. he got of this morning, went to the bathroom.  He became dizzy and unsteady, causing him to fall.  He bumped his head and landed on his left hip.  He denies any loss of consciousness.  He was taken to [facility] and found to have a left intertrochanteric femur fracture and a left head laceration, which was where the bleeding was controlled and the patient was admitted for definitive care.         PAST MEDICAL AND SURGICAL HISTORY:  Significant for arthritis, malignant neoplasm of the prostate, bilateral not excessive age-related macular degeneration, malignant neoplasm of the kidney excluding the renal pelvis, hypertension, cervicalgia, neuropathy, erectile dysfunction,  cervical lesion, obstructive sleep apnea, malignant neoplasm of the skin, low vitamin B12 level, CVA, and chronic midline low back pain.  He has had previous orthopedic surgery.         MEDICATIONS ON ADMISSION:  Norvasc, aspirin, Colestid, vitamin B12, Neurontin, PreserVision.           ALLERGIES:  NO KNOWN DRUG ALLERGIES.         FAMILY HISTORY:  Noncontributory.         SOCIAL HISTORY:  , lives with his wife and ambulates independently.         REVIEW OF SYSTEMS:  A 10-point review of systems is positive for left hip pain in a left posterior occipital wound.         PHYSICAL EXAMINATION:  On physical examination, lying in his hospital bed in no apparent distress.  Alert and oriented.  Very conversive, very pleasant.  He is afebrile.  Vital signs are stable.  He has an abrasion on the left posterior skull, but otherwise no obvious other head trauma.  Cranial nerves are grossly intact.  Right and left upper extremities are  within normal limits.  Skin is clean, dry, and intact.  Palpable radial pulse.  Radial, ulnar, and median nerve sensation and function intact bilaterally.  Right and left lower extremity skin is clean, dry, and intact.  Palpable dorsalis pedis pulses.  EHL, FHL, tibial, gastrocsoleus all fire.  Superficial deep peroneus, saphenous, tibial and sural nerves and sensation is intact bilaterally.  He is tender to palpation around his left hip and left leg is somewhat shortened and externally rotated.  The right lower extremity is no tenderness to palpation or crepitus.         IMAGING STUDIES:  X-rays reviewed.  AP pelvis and left lateral hip shows a left intertrochanteric femur fracture.         LABORATORY DATA:  On admission:  Sodium 145, potassium 4.6, creatinine 2.15.  White blood cell count 9.5, hemoglobin 12.8.         IMPRESSION/REPORT/PLAN:  I had a long discussion with Mr. Fine regarding his left intertrochanteric femur fracture.  I think he would benefit from internal fixation with intramedullary hip screw for improved pain management and increased ambulation.  He and his wife are happy with this plan of care.  We will make arrangements for surgery as soon as he is medically stable.      Revised work type lg 18         NAVNEET TERRELL MD             D: 2018   T: 2018   MT: ALLYSON      Name:     NOEMI FINE   MRN:      [id number]        Account:       [id number]   :      11/15/1925           Consult Date:  2018      Document: [id number]       cc: ZAHIDA COPPOLA PA-C

## 2018-11-21 NOTE — PLAN OF CARE
Problem: Patient Care Overview  Goal: Plan of Care/Patient Progress Review  Discharge Planner PT   Patient plan for discharge: None stated.   Current status: Patient sleeping soundly upon arrival of therapist, patient participated in supine strengthening/ROM exercises with continuous cues to keep eyes open and participate. Deferred OOB mobility due to lethargy.   Barriers to return to prior living situation: Limited tolerance to OOB mobility, fatigue, Pain, Level of assist  Recommendations for discharge: TCU  Rationale for recommendations: Pt is below baseline in regards to mobility and will benefit from continued skilled PT intervention at TCU in order to increase independence and safety with mobility.        Entered by: Lucia Gabriel 11/21/2018 11:25 AM

## 2018-11-21 NOTE — PROVIDER NOTIFICATION
Brief update    Lasix 40 IV x1 for increased hypoxia.    Assess for infectious S/S.    Jules Aguilar MD  12:46 AM

## 2018-11-21 NOTE — PLAN OF CARE
Problem: Patient Care Overview  Goal: Plan of Care/Patient Progress Review  Outcome: Improving  Disoriented to place and situation. Tele NSR with BB VSS ex 2L NC, weaned from 3L overnight. LS clear. Hospitalist notified on deo shift for change in O2 status, pt diuresed with 40 IV Lasix, output totaled 1550 straight cathed overnight. Pt reports increase in ease of breathing,  lung sounds improved from fine crackles to clear bilaterally. Abd distended, nontender. L hip dressing CDI. IV SL. Oxy given for pain x1 with relief. Ambulated A2 gb/walker, not OOB this shift. Tolerating regular diet. Pt DTV, being straight cathed since Valencia out yesterday. No BM this shift. Discharge pending to TCU on 11/22. Continue to monitor.

## 2018-11-21 NOTE — PLAN OF CARE
Problem: Patient Care Overview  Goal: Plan of Care/Patient Progress Review  Outcome: Improving  Patient up with assist of 2 and walker.  Pain managed with scheduled Tylenol.  VSS on 1.5-2L NC.  Alert to self and time.  Dressing changed, CDI.  CMS intact.  DTV; patient attempting to void prior to straight cath.  Good PO intake.  IS encouraged.  Up in chair in afternoon.  Tele NSR with BBB.  Plan to discharge to TCU.

## 2018-11-21 NOTE — PROGRESS NOTES
[facility]    Hospitalist Progress Note      Assessment & Plan   Sourav Fine is a 89 year old male who was admitted on 11/19/2018.  Past history of CVA, HTN, ILIR, CKD who presents with syncope with associated left hip fracture s/p ORIF 11/19.      ADDENDUM:  Notified of urinary retention requiring straight cath x3 and now again with PVR of 300.  Will place abdi and start flomax.  Will need to watch pressures carefully as BP soft and presented with syncope.    Syncope  Etiology unclear, highest suspicion for hypovolemia given prodrome and elevated Na at admission.  Also with recurrent event when got up with PT 11/20 (orthostatics unfortunately not checked).  Denied cardiorespiratory symptoms with non-ischemic EKG (trops not trended).  TTE 11/19 with EF 60-65% without WMA or significant valvular disease.  Head CT unremarkable for acute pathology, but does show known old left frontal infarct.  Neuro exam non-focal.  Carotid US 11/21 negative for stenosis.  - tele negative for arrhythmia, continue   - orthostatics currently negative, will stop monitoring  - had been on NS but received lasix overnight due to dyspnea/hypoxia    Acute hypoxic resp failure due to volume overload  Hypoxic overnight 11/20-11/21 after initiation of IVF.  CXR with pulmonary edema and mild pleural effusions.  Received lasix 40 mg IV x1 with good response.  - oxygen requirements improved, wean as able   - I/O's appear equal past 24 hours  - hold further lasix or diuretics (may need additional dose as mild crackles but will hold for now given bump in Cr)     Left intertrochanteric hip fracture s/p ORIF 11/19  Due to fall.  Procedure uncomplicated.    - post-op management per Ortho    Acute blood loss anemia  - hgb 12.8 > 11.4 > 10.4 post-op; monitor     HTN  Placed on Norvasc 5 mg daily 2 weeks ago, but stopped on own due to dizziness.  - pressures currently low-normal; holding amlodipine  - PRN Hydralazine available for SBP  >180    ILIR  Pt declines CPAP at this time, available PRN      CKD IV  History of right radical nephrectomy  Baseline creatinine 2.1-2.2. Creatinine on admission 2.15.   - Cr up slightly 11/21 at 2.38; due to receiving lasix overnight  - repeat BMP in AM     CVA  Left frontal lobe infarction in 5/2018. Head CT on admission showing an old left frontal infarct. No residual deficits.      Peripheral neuropathy   Vitamin B12 deficiency  - continue PTA gabapentin     Chronic diarrhea  Seen by GI in the outpatient setting. Hold antidiarrheal medication while on narcotics.      DVT Prophylaxis: Enoxaparin (Lovenox) SQ  Code Status: Prior    Disposition: Expected discharge in 1-2 days once weaned from oxygen, hgb stable, cleared by Ortho.    Brett Bryant    Interval History   Denies any lightheadedness.  No palpitations, dyspnea, cough.  Pain is well controlled, no other complaints.    -Data reviewed today: I reviewed all new labs and imaging results over the last 24 hours. I personally reviewed no images or EKG's today.    Physical Exam   Temp: 97.2  F (36.2  C) Temp src: Oral BP: 119/59 Pulse: 76 Heart Rate: 90 Resp: 18 SpO2: 92 % O2 Device: Nasal cannula Oxygen Delivery: 1 LPM  Vitals:    11/19/18 0748   Weight: 81.6 kg (180 lb)     Vital Signs with Ranges  Temp:  [97.2  F (36.2  C)-98.7  F (37.1  C)] 97.2  F (36.2  C)  Pulse:  [76] 76  Heart Rate:  [85-95] 90  Resp:  [14-24] 18  BP: (113-134)/(54-64) 119/59  SpO2:  [78 %-95 %] 92 %  I/O last 3 completed shifts:  In: 1970 [P.O.:1170; I.V.:800]  Out: 1900 [Urine:1900]    Constitutional: Well developed, well nourished male in no acute distress  Respiratory: Mild bibasilar crackles, no wheezing or tachypnea  Cardiovascular: regular rate and rhythm, normal S1/S2 without murmur, rubs or gallops  GI: abdomen soft, normal bowel sounds  Lymph: 1+ ankle edema   Other:  alert and appropriate, cranial nerves grossly intact    Medications       acetaminophen  975 mg Oral Q8H      enoxaparin  30 mg Subcutaneous Q24H     gabapentin  1,500 mg Oral At Bedtime     ranitidine  150 mg Oral At Bedtime     senna-docusate  1 tablet Oral BID    Or     senna-docusate  2 tablet Oral BID     sodium chloride (PF)  3 mL Intracatheter Q8H       Data     Recent Labs  Lab 11/21/18  0736 11/20/18  0634 11/19/18  0746   WBC  --   --  9.5   HGB 10.4* 11.4* 12.8*   MCV  --   --  97   PLT  --   --  214     --  145*   POTASSIUM 4.5  --  4.6   CHLORIDE 114*  --  113*   CO2 23  --  26   BUN 44*  --  41*   CR 2.38* 2.00* 2.15*   ANIONGAP 7  --  6   AGNIESZKA 7.2*  --  8.0*   * 133* 111*       Recent Results (from the past 24 hour(s))   US Carotid Bilateral    Narrative    BILATERAL CAROTID ULTRASOUND   11/20/2018 4:11 PM     HISTORY:  Syncope.     COMPARISON: None.    RIGHT CAROTID FINDINGS:  Minimal plaque  Right ICA PSV:  105  cm/sec.  Right ICA EDV:  26 cm/sec.  Right ICA/CCA PSV Ratio:  1.0    These indicate less than 50% diameter stenosis of the right ICA.    Right Vertebral: Antegrade flow.   Right ECA: Antegrade flow.     LEFT CAROTID FINDINGS:  No plaque.  Left ICA PSV:  123  cm/sec.  Left ICA EDV:  34 cm/sec.  Left ICA/CCA PSV Ratio:  1.1    These indicate less than 50% diameter stenosis of the left ICA.    Left Vertebral: Antegrade flow.   Left ECA: Antegrade flow.     Causes of Decreased Accuracy:   None.       Impression    IMPRESSION:    1. Less than 50% diameter stenosis of the right ICA relative to the  distal ICA diameter.  2. Less than 50% diameter stenosis of the left ICA relative to the  distal ICA diameter.    SANTOSH ALICEA MD   XR Chest 2 Views    Narrative    XR CHEST 2 VW  11/20/2018 11:51 PM     HISTORY: Hypoxia.    COMPARISON: 11/19/2018.    FINDINGS: The heart is enlarged. There is pulmonary vascular  congestion. No pulmonary edema. There are small bilateral pleural  effusions. Mild left basilar infiltrate. No pneumothorax. Degenerative  disease in the spine.      Impression     IMPRESSION: Pulmonary vascular congestion. Small bilateral pleural  effusions.    GERSON JOHNSTON MD

## 2018-11-21 NOTE — PLAN OF CARE
Problem: Patient Care Overview  Goal: Plan of Care/Patient Progress Review  Outcome: Declining  Patient need to be back on Oxygen 3 LNC.  O2 sats at 2200 were 78%.  O2 sats were 91% at 3 pm he was on roomair at 91%. Lung sounds are diminished and no crackles. Sent for a xray of his lungs,  12 lead xray done.  St cath for 300 ml.  Patient bladder scan 221. He had  A smear of stool. Bowel sounds hyperactive. Discontinued IVF. A&Ox3

## 2018-11-21 NOTE — PROGRESS NOTES
NICK  D: Attempted to meet with pt to discuss discharge planning. Pt was sleepy and requested SW call his wife, Opal. NICK attempted to call Opal at [phone] but phone continued to ring, no voicemail. Consult pending conversation with Opal.   P: Will continue to follow and support safe discharge plan    Kyara Madsen MSW, LGSW

## 2018-11-22 ENCOUNTER — APPOINTMENT (OUTPATIENT)
Dept: PHYSICAL THERAPY | Facility: CLINIC | Age: 83
DRG: 480 | End: 2018-11-22
Payer: MEDICARE

## 2018-11-22 LAB
ANION GAP SERPL CALCULATED.3IONS-SCNC: 5 MMOL/L (ref 3–14)
BUN SERPL-MCNC: 44 MG/DL (ref 7–30)
CALCIUM SERPL-MCNC: 8 MG/DL (ref 8.5–10.1)
CHLORIDE SERPL-SCNC: 114 MMOL/L (ref 94–109)
CO2 SERPL-SCNC: 24 MMOL/L (ref 20–32)
CREAT SERPL-MCNC: 2.21 MG/DL (ref 0.66–1.25)
GFR SERPL CREATININE-BSD FRML MDRD: 28 ML/MIN/1.7M2
GLUCOSE SERPL-MCNC: 115 MG/DL (ref 70–99)
HGB BLD-MCNC: 10.3 G/DL (ref 13.3–17.7)
PLATELET # BLD AUTO: 172 10E9/L (ref 150–450)
POTASSIUM SERPL-SCNC: 4.5 MMOL/L (ref 3.4–5.3)
SODIUM SERPL-SCNC: 143 MMOL/L (ref 133–144)

## 2018-11-22 PROCEDURE — 85018 HEMOGLOBIN: CPT | Performed by: ORTHOPAEDIC SURGERY

## 2018-11-22 PROCEDURE — A9270 NON-COVERED ITEM OR SERVICE: HCPCS | Performed by: PHYSICIAN ASSISTANT

## 2018-11-22 PROCEDURE — 97110 THERAPEUTIC EXERCISES: CPT | Mod: GP

## 2018-11-22 PROCEDURE — 25000132 ZZH RX MED GY IP 250 OP 250 PS 637: Performed by: ORTHOPAEDIC SURGERY

## 2018-11-22 PROCEDURE — 25000132 ZZH RX MED GY IP 250 OP 250 PS 637: Performed by: PHYSICIAN ASSISTANT

## 2018-11-22 PROCEDURE — 85049 AUTOMATED PLATELET COUNT: CPT | Performed by: ORTHOPAEDIC SURGERY

## 2018-11-22 PROCEDURE — 12000007 ZZH R&B INTERMEDIATE

## 2018-11-22 PROCEDURE — 25000132 ZZH RX MED GY IP 250 OP 250 PS 637: Performed by: HOSPITALIST

## 2018-11-22 PROCEDURE — 99232 SBSQ HOSP IP/OBS MODERATE 35: CPT | Performed by: HOSPITALIST

## 2018-11-22 PROCEDURE — 36415 COLL VENOUS BLD VENIPUNCTURE: CPT | Performed by: ORTHOPAEDIC SURGERY

## 2018-11-22 PROCEDURE — 97530 THERAPEUTIC ACTIVITIES: CPT | Mod: GP

## 2018-11-22 PROCEDURE — 40000193 ZZH STATISTIC PT WARD VISIT

## 2018-11-22 PROCEDURE — 25000128 H RX IP 250 OP 636: Performed by: ORTHOPAEDIC SURGERY

## 2018-11-22 PROCEDURE — 80048 BASIC METABOLIC PNL TOTAL CA: CPT | Performed by: ORTHOPAEDIC SURGERY

## 2018-11-22 PROCEDURE — A9270 NON-COVERED ITEM OR SERVICE: HCPCS | Performed by: ORTHOPAEDIC SURGERY

## 2018-11-22 RX ADMIN — ACETAMINOPHEN 650 MG: 325 TABLET, FILM COATED ORAL at 22:51

## 2018-11-22 RX ADMIN — ACETAMINOPHEN 975 MG: 325 TABLET, FILM COATED ORAL at 06:22

## 2018-11-22 RX ADMIN — TAMSULOSIN HYDROCHLORIDE 0.4 MG: 0.4 CAPSULE ORAL at 08:43

## 2018-11-22 RX ADMIN — GABAPENTIN 1500 MG: 400 CAPSULE ORAL at 21:11

## 2018-11-22 RX ADMIN — SENNOSIDES AND DOCUSATE SODIUM 2 TABLET: 8.6; 5 TABLET ORAL at 08:43

## 2018-11-22 RX ADMIN — RANITIDINE 150 MG: 150 TABLET ORAL at 21:12

## 2018-11-22 RX ADMIN — SENNOSIDES AND DOCUSATE SODIUM 2 TABLET: 8.6; 5 TABLET ORAL at 21:11

## 2018-11-22 RX ADMIN — ENOXAPARIN SODIUM 30 MG: 30 INJECTION SUBCUTANEOUS at 14:27

## 2018-11-22 RX ADMIN — ACETAMINOPHEN 975 MG: 325 TABLET, FILM COATED ORAL at 14:27

## 2018-11-22 ASSESSMENT — ACTIVITIES OF DAILY LIVING (ADL)
ADLS_ACUITY_SCORE: 10

## 2018-11-22 NOTE — PLAN OF CARE
Problem: Patient Care Overview  Goal: Plan of Care/Patient Progress Review  Discharge Planner PT   Patient plan for discharge: None stated.   Current status: Max-A supine>sit, performed very slowly and difficulty to scoot to EOB due to increased pain. Sits EOB x 3 min to calm pain, requires UE support and SBA for balance. CGA sit<>stand from elevated bed. Mod-A to amb a few feet and transfer to bedside chair, B knees mildly buckly due to pain. Pt up in bedside chair at end of tx. Unable to tolerate functional gait. Good LE exercise performance.  Barriers to return to prior living situation: Limited tolerance to OOB mobility, fatigue, Pain, Level of assist  Recommendations for discharge: TCU. Per chart discharge tomorrow.  Rationale for recommendations: Pt is below baseline in regards to mobility and will benefit from continued skilled PT intervention at TCU in order to increase independence and safety with mobility.        Entered by: Nader Bautista 11/22/2018 2:29 PM

## 2018-11-22 NOTE — PLAN OF CARE
Problem: Patient Care Overview  Goal: Plan of Care/Patient Progress Review  Outcome: Improving  Pt is AOx2, not to place and situation, CMS intact, VSS on RA, dressing CDI, abdi patent, adequate output, pain managed with tylenol scheduled. Assist x2, no dizziness overnight. Tele SR with BBB, pending TCU discharge.

## 2018-11-22 NOTE — PROGRESS NOTES
"Orthopedic Surgery  11/22/2018  POD#: 3    S: Patient voices no complaints today.     O: Blood pressure 129/63, pulse 80, temperature 98  F (36.7  C), temperature source Oral, resp. rate 18, height 1.778 m (5' 10\"), weight 81.6 kg (180 lb), SpO2 90 %.  Lab Results   Component Value Date    HGB 10.3 11/22/2018     Lab Results   Component Value Date    INR 0.98 01/26/2006        I/O last 3 completed shifts:  In: 1050 [P.O.:1050]  Out: 1300 [Urine:1300]    Neurovascularly intact.  Calves are negative bilaterally, both soft and nontender.  The wound is C/D/I.  The wound looks good with minimal erythema of the surrounding skin.    A: Mr. Fine is doing well status post Procedure(s):  ORIF L intertrochanteric femur fracture    P:   1. Mobilize and continue physical therapy. WBAT.  2. Anticoagulation - ASA at discharge  3. Pain management - controlled  4. Anticipate discharge to TCU when placement found      Doretha Hernandez PA-C  O: [phone]  "

## 2018-11-22 NOTE — PLAN OF CARE
Problem: Patient Care Overview  Goal: Plan of Care/Patient Progress Review  Outcome: Improving  VSS, CMS intact. Up with 2 and walker. Good pain controlled with tylenol. Dressing c/d/i. Alert and orient x 3-4 with intermittent confusion. Valencia patent and draining clear yellow urine. Discharge to TCU tomorrow.

## 2018-11-22 NOTE — PROGRESS NOTES
[facility]  Hospitalist Progress Note    When I evaluated patient: 11/22/2018    Assessment & Plan   Sourav Fine is a 89 year old male who was admitted on 11/19/2018. Past with history of CVA, HTN, ILIR, CKD who presented with syncope with associated left hip fracture s/p ORIF 11/19.    Syncope  Etiology unclear, highest suspicion for hypovolemia given prodrome and hypernatremia at admission.  Also with recurrent event when got up with PT 11/20 (orthostatics unfortunately not checked).  Denied cardiorespiratory symptoms with non-ischemic EKG (trops not trended).  TTE 11/19 with EF 60-65% without WMA or significant valvular disease.  Head CT unremarkable for acute pathology, but does show known old left frontal infarct.  Neuro exam non-focal.  Carotid US 11/21 negative for stenosis.  - Tele negative for arrhythmia, will keep in tele while here.  - Orthostatic negative. Was on IVF, discontinue due to hypoxia  - had been on NS but discontinued due to dyspnea/hypoxia    Acute hypoxic resp failure due to volume overload  Hypoxic overnight 11/20-11/21 after initiation of IVF. CXR with pulmonary edema and mild pleural effusions. Received lasix 40 mg IV x1 with good response.  - oxygen requirements improved, weaned off supplemental O2  - I/O's Neg 250 ml in past 24 hours  - hold further diuresis since weaned off oxygen, intravascularly dry when he presented, and poor oral intake.  -Continue to encourage incentive spirometry, discussed with nursing staff to remind him frequently.     Left intertrochanteric hip fracture s/p ORIF 11/19  Due to fall. Procedure uncomplicated.    - post-op management per Ortho    Acute blood loss anemia  - hgb 12.8 > 11.4 > 10.4>10.3    -Hb stable.     HTN  Placed on Norvasc 5 mg daily 2 weeks ago, but stopped on own due to dizziness.  - pressures currently low-normal; holding amlodipine  - PRN Hydralazine available for SBP >180    ILIR  Pt declines CPAP at this time, available  PRN      CKD IV  History of right radical nephrectomy  Baseline creatinine 2.1-2.2. Creatinine on admission 2.15.   - Cr up slightly 11/21 at 2.38; and is 2.21 which is near his baseline today.  -Light increase in creatinine could also be due to urinary retention.  Now has Valencia catheter.     Urinary retention:  Post op developed urinary retention.  Unable to obtain any symptoms consistent with BPH but likely has it given his age.  -Valencia catheter was placed 11/21, Flomax started  -Voiding trial at TCU, if retention, urology referral.    CVA  Left frontal lobe infarction in 5/2018. Head CT on admission showing an old left frontal infarct. No residual deficits.      Peripheral neuropathy   Vitamin B12 deficiency  - continue PTA gabapentin     Chronic diarrhea  Seen by GI in the outpatient setting. Hold antidiarrheal medication while on narcotics.    DVT Prophylaxis: Enoxaparin (Lovenox) SQ  Code Status: Prior    Disposition: Expected discharge: possibly tomorrow, discussed with patient, RN and SW, unable to reach his wife.      Hansel Limon MD  Hospitalist    Interval History    Patient is lying in bed comfortable, Hip is sore. Denies dyspnea. On room air currently. No chest pain or dizziness.      -Data reviewed today: I reviewed all new labs and imaging results over the last 24 hours. I personally reviewed no images or EKG's today.    Physical Exam   Temp: 97.9  F (36.6  C) Temp src: Oral BP: 135/64 Pulse: 80 Heart Rate: 86 Resp: 18 SpO2: 91 % O2 Device: None (Room air) Oxygen Delivery: 1 LPM  Vitals:    11/19/18 0748   Weight: 81.6 kg (180 lb)     Vital Signs with Ranges  Temp:  [97.7  F (36.5  C)-98.3  F (36.8  C)] 97.9  F (36.6  C)  Pulse:  [80] 80  Heart Rate:  [82-86] 86  Resp:  [18] 18  BP: (123-143)/(58-65) 135/64  SpO2:  [90 %-94 %] 91 %  I/O last 3 completed shifts:  In: 1050 [P.O.:1050]  Out: 1300 [Urine:1300]    Constitutional: Well developed, well nourished male in no acute distress  HEENT: MONIQUE  EOMI  Respiratory: Mild bibasilar crackles, no wheezing or tachypnea  Cardiovascular: Regular S1S2, no tachycardia  GI: abdomen soft, normal bowel sounds  Lymph: 1+ ankle edema   Other:  alert and appropriate, cranial nerves grossly intact.    Medications       enoxaparin  30 mg Subcutaneous Q24H     gabapentin  1,500 mg Oral At Bedtime     ranitidine  150 mg Oral At Bedtime     senna-docusate  1 tablet Oral BID    Or     senna-docusate  2 tablet Oral BID     sodium chloride (PF)  3 mL Intracatheter Q8H     tamsulosin  0.4 mg Oral Daily       Data     Recent Labs  Lab 11/22/18  0652 11/21/18  0736 11/20/18  0634 11/19/18  0746   WBC  --   --   --  9.5   HGB 10.3* 10.4* 11.4* 12.8*   MCV  --   --   --  97     --   --  214    144  --  145*   POTASSIUM 4.5 4.5  --  4.6   CHLORIDE 114* 114*  --  113*   CO2 24 23  --  26   BUN 44* 44*  --  41*   CR 2.21* 2.38* 2.00* 2.15*   ANIONGAP 5 7  --  6   AGNIESZKA 8.0* 7.2*  --  8.0*   * 121* 133* 111*       No results found for this or any previous visit (from the past 24 hour(s)).

## 2018-11-22 NOTE — PROGRESS NOTES
SW:  D:  Received a call back from [facility].  They can accept patient tomorrow.  P:  Will continue to follow.

## 2018-11-22 NOTE — PLAN OF CARE
Problem: Patient Care Overview  Goal: Plan of Care/Patient Progress Review  Outcome: Improving  A&O x 3, baseline dementia.  VSS, RA.  CMS intact.  Dressing changed.  Pain managed with schedule tylenol.  Up with 1 to 2 assist.  Tele SR with BBB. Valencia patent.  Possible discharge to TCU tomorrow, waiting for placement.  Pt will go with Valencia catheter.  Continue to monitor.

## 2018-11-22 NOTE — CONSULTS
Care Transition Initial Assessment -   Reason For Consult: discharge planning  Met with: Patient    Active Problems:    Intertrochanteric fracture (H)    Fracture, intertrochanteric, left femur (H)       DATA  Lives With: spouse  Living Arrangements: apartment     Identified issues/concerns regarding health management:       Per care transitions consult for discharge planning and tcu placement on discharge.  Patient was admitted on 11-19-18 after a fall resulting in a hip fracture.  The tentative date of discharge is 11-23-18.  Reviewed chart and spoke with patient regarding discharge plans.  Per patient report, patient lives with his wife in a third floor apartment with an elevator.  Patient uses no assistive devices at baseline.  Reviewed the therapy discharge recommendations of tcu placement on discharge and patient is in agreement.  Patient states he would like to go to a facility close to home and he is asking for referrals to be sent to [facility] or [facility].  Referrals sent, via discharge on the double, to check bed availability.  Patient is also asking that we contact his son Papo, [phone], once we have an update.    ASSESSMENT  Cognitive Status:  awake and alert  Concerns to be addressed: discharge planning and tcu placement on discharge.     PLAN  Financial costs for the patient includes N/A.  Patient given options and choices for discharge TCU choices.  Patient/family is agreeable to the plan?  Yes  Patient Goals and Preferences: TCU placement on discharge.  Patient anticipates discharging to:  TCU      Will confirm a bed, continue to follow, and assist with a safe discharge plan.    JEANNETTE Alvarenga, [facility]  [phone]

## 2018-11-23 VITALS
RESPIRATION RATE: 16 BRPM | BODY MASS INDEX: 25.77 KG/M2 | HEART RATE: 83 BPM | HEIGHT: 70 IN | OXYGEN SATURATION: 91 % | TEMPERATURE: 99.1 F | WEIGHT: 180 LBS | SYSTOLIC BLOOD PRESSURE: 160 MMHG | DIASTOLIC BLOOD PRESSURE: 72 MMHG

## 2018-11-23 LAB
CREAT SERPL-MCNC: 2.2 MG/DL (ref 0.66–1.25)
GFR SERPL CREATININE-BSD FRML MDRD: 28 ML/MIN/1.7M2

## 2018-11-23 PROCEDURE — 25000132 ZZH RX MED GY IP 250 OP 250 PS 637: Performed by: PHYSICIAN ASSISTANT

## 2018-11-23 PROCEDURE — 36415 COLL VENOUS BLD VENIPUNCTURE: CPT | Performed by: ORTHOPAEDIC SURGERY

## 2018-11-23 PROCEDURE — 25000132 ZZH RX MED GY IP 250 OP 250 PS 637: Performed by: HOSPITALIST

## 2018-11-23 PROCEDURE — 25000132 ZZH RX MED GY IP 250 OP 250 PS 637: Performed by: ORTHOPAEDIC SURGERY

## 2018-11-23 PROCEDURE — A9270 NON-COVERED ITEM OR SERVICE: HCPCS | Performed by: ORTHOPAEDIC SURGERY

## 2018-11-23 PROCEDURE — A9270 NON-COVERED ITEM OR SERVICE: HCPCS | Performed by: PHYSICIAN ASSISTANT

## 2018-11-23 PROCEDURE — 25000128 H RX IP 250 OP 636: Performed by: ORTHOPAEDIC SURGERY

## 2018-11-23 PROCEDURE — 25000131 ZZH RX MED GY IP 250 OP 636 PS 637: Performed by: ORTHOPAEDIC SURGERY

## 2018-11-23 PROCEDURE — 82565 ASSAY OF CREATININE: CPT | Performed by: ORTHOPAEDIC SURGERY

## 2018-11-23 PROCEDURE — 99239 HOSP IP/OBS DSCHRG MGMT >30: CPT | Performed by: HOSPITALIST

## 2018-11-23 RX ORDER — TAMSULOSIN HYDROCHLORIDE 0.4 MG/1
0.4 CAPSULE ORAL DAILY
Qty: 60 CAPSULE | DISCHARGE
Start: 2018-11-24 | End: 2018-12-19

## 2018-11-23 RX ORDER — AMLODIPINE BESYLATE 2.5 MG/1
2.5 TABLET ORAL DAILY
DISCHARGE
Start: 2018-11-23

## 2018-11-23 RX ADMIN — SENNOSIDES AND DOCUSATE SODIUM 2 TABLET: 8.6; 5 TABLET ORAL at 08:23

## 2018-11-23 RX ADMIN — TAMSULOSIN HYDROCHLORIDE 0.4 MG: 0.4 CAPSULE ORAL at 08:23

## 2018-11-23 RX ADMIN — ACETAMINOPHEN 650 MG: 325 TABLET, FILM COATED ORAL at 08:23

## 2018-11-23 RX ADMIN — TRAMADOL HYDROCHLORIDE 50 MG: 50 TABLET, COATED ORAL at 10:58

## 2018-11-23 RX ADMIN — ONDANSETRON 4 MG: 4 TABLET, ORALLY DISINTEGRATING ORAL at 14:03

## 2018-11-23 RX ADMIN — ENOXAPARIN SODIUM 30 MG: 30 INJECTION SUBCUTANEOUS at 14:03

## 2018-11-23 ASSESSMENT — ACTIVITIES OF DAILY LIVING (ADL)
ADLS_ACUITY_SCORE: 10

## 2018-11-23 NOTE — PROGRESS NOTES
NICK  D: Received call from [facility] that pt can be accepted for admission. Met with pt to see which TCU he'd like to go to. Pt prefers [facility]. Called and updated [facility] that pt would like to go there. Transport aide will get pt at 1400. NICK paged MD for orders.   P: Will continue to follow and support a safe discharge plan    Kyara Madsen MSW, LGSW     ADDENDUM @ 0466: Faxed PAS, orders to [facility].    PAS-RR    D: Per DHS regulation, [facility] completed and submitted PAS-RR to [place] [employer] Direct Connect via the Senior LinkAge Line.  PAS-RR confirmation # is : [id number]    I: [facility] spoke with pt and they are aware a PAS-RR has been submitted.  NICK reviewed with pt that they may be contacted for a follow up appointment within 10 days of hospital discharge if their SNF stay is < 30 days.  Contact information for [facility] LinkAge Line was also provided.    A: Pt verbalized understanding.    P: Further questions may be directed to [facility] LinkAge Line at #[phone], option #4 for PAS-RR staff.

## 2018-11-23 NOTE — PLAN OF CARE
Problem: Patient Care Overview  Goal: Plan of Care/Patient Progress Review  Outcome: No Change  Shift [id number]: Pt A/O to self. Up w/ 2 A GB, walker. Tylenol given for low grade fever. Denies pain. VSS on RA. IV SL. Tolerating reg diet. CMS intact. Valencia remain for retention, OP adequate. Dressing CDI. Tele is SR w/ BBB. Plan is to discharge tomorrow to TCU.

## 2018-11-23 NOTE — DISCHARGE SUMMARY
[facility]    Discharge Summary  Hospitalist    Date of Admission:  11/19/2018  Date of Discharge:  11/23/2018  Discharging Provider: Hansel Limon MD  Date of Service (when I saw the patient): 11/23/18    Discharge Diagnoses     Syncope, suspect orthostatic hypotension.     Acute hypoxic resp failure due to atelectasis and volume overload    Left intertrochanteric hip fracture s/p ORIF 11/19    Acute blood loss anemia    Essential HTN    ILIR    CKD stage 4    H/o radical nephrectomy    H/o CVA    Peripheral neuropathy    Vit B12 deficiency       History of Present Illness   Sourav Fine is a 89 year old male who was admitted on 11/19/2018. Past with history of CVA, HTN, ILIR, CKD who presented with syncope with associated left hip fracture s/p ORIF 11/19.    Hospital Course   Sourav Fine was admitted on 11/19/2018.  The following problems were addressed during his hospitalization:     Syncope  Etiology unclear, highest suspicion for hypovolemia given prodrome and hypernatremia at admission. He was on Amlodipine and BP as well was soft, so could be orthostatic hypotension as well. Also with recurrent event when got up with PT 11/20 (orthostatics unfortunately not checked).  Denied cardiorespiratory symptoms with non-ischemic EKG (trops not trended).  TTE 11/19 with EF 60-65% without WMA or significant valvular disease.  Head CT unremarkable for acute pathology, but does show known old left frontal infarct.  Neuro exam non-focal.  Carotid US 11/21 negative for stenosis.  Tele negative for arrhythmia. Symptoms reported not consistent with seizure. Sustained Lt hip fracture which was treated with ORIF, going to TCU, continue to monitor. Fall risk. His PTA amlodipine was decreased to half at discharge.      Acute hypoxic resp failure due to volume overload and atelectasis.   Hypoxic overnight 11/20-11/21 after initiation of IVF. CXR with pulmonary edema and mild pleural effusions. Received lasix  40 mg IV x1 with good response.  - oxygen requirements improved, weaned off supplemental O2  - I/O's even to slightly negative.   - Diuretic not continued since weaned off oxygen, he was intravascularly dry when he presented, and given poor oral intake.  - Has temp of 100.2, suspect atelectasis, patient is forgetful, Continue to encourage incentive spirometry at TCU as well after discharge, discussed with nursing staff to remind him frequently.      Left intertrochanteric hip fracture s/p ORIF 11/19  Due to fall. Procedure uncomplicated.    - post-op managed per Ortho  - pain well controlled, and doing well with therapy and discharging to rehab. (TCU)     Acute blood loss anemia  -hgb 12.8 > 11.4 > 10.4>10.3    -Hb stable.      HTN  Placed on Norvasc 5 mg daily 2 weeks ago, but stopped on own due to dizziness.  - Amlodipine resumed at lower dose 2.5 mg daily.     ILIR  Pt declines CPAP at this time, available PRN       CKD IV  History of right radical nephrectomy  Baseline creatinine 2.1-2.2. Creatinine on admission 2.15.   -Cr up slightly 11/21 at 2.38; and is 2.21>> 2.20 which is near his baseline today.  -Light increase in creatinine could also be due to urinary retention.  Now has Valencia catheter.      Urinary retention:  Post op developed urinary retention.  Unable to obtain any symptoms consistent with BPH but likely has it given his age.  -Valencia catheter was placed 11/21, Flomax started  -Voiding trial at TCU, if retention, needs urology referral.     CVA  Left frontal lobe infarction in 5/2018. Head CT on admission showing an old left frontal infarct. No residual deficits.       Peripheral neuropathy   Vitamin B12 deficiency  - Continue PTA gabapentin     Chronic diarrhea  Seen by GI in the outpatient setting. Hold antidiarrheal medication while on narcotics.    Hansel Limon MD  Hospitalist    Significant Results and Procedures   Multiple XRays, CT head, US carotids reports attached  2D ECHO  ORIF for Lt  hip fracture.     Pending Results   These results will be followed up by none  Unresulted Labs Ordered in the Past 30 Days of this Admission     No orders found from 9/20/2018 to 11/20/2018.          Code Status   DNR / DNI       Primary Care Physician   Nick Finney    Constitutional:  Well developed, well nourished male in no acute distress  HEENT: PERRLA EOMI  Respiratory: equal air entry, lungs clear  Cardiovascular: Regular S1S2, no tachycardia  GI: abdomen soft, normal bowel sounds  Lymph: 1+ ankle edema unchanged  : Valencia catheter in place.   Other: alert and confused, but calm and appropriate, cranial nerves grossly intact.       Discharge Disposition   Discharged to TCU  Condition at discharge: Stable    Consultations This Hospital Stay   PHYSICAL THERAPY ADULT IP CONSULT  OCCUPATIONAL THERAPY ADULT IP CONSULT  SOCIAL WORK IP CONSULT  ORTHOPEDIC SURGERY IP CONSULT  PHARMACY IP CONSULT  OCCUPATIONAL THERAPY ADULT IP CONSULT  PHYSICAL THERAPY ADULT IP CONSULT  PHYSICAL THERAPY ADULT IP CONSULT  OCCUPATIONAL THERAPY ADULT IP CONSULT  CARE TRANSITION RN/SW IP CONSULT    Time Spent on this Encounter   IHansel, personally saw the patient today and spent greater than 30 minutes discharging this patient.    Discharge Orders     General info for SNF   Length of Stay Estimate: Short Term Care: Estimated # of Days <30  Condition at Discharge: Improving  Level of care:skilled   Rehabilitation Potential: Good  Admission H&P remains valid and up-to-date: Yes  Recent Chemotherapy: N/A  Use Nursing Home Standing Orders: Yes     Mantoux instructions   Give two-step Mantoux (PPD) Per Facility Policy Yes     Reason for your hospital stay   Left intertrochanteric femur fracture open reduction and internal fixation     Wound care   Site:   Left hip x2  Instructions:  Leave dressing intact if Aquacel and remove at POD #7-10, remove staples POD #14  Daily dressing changes with gauze and tape     Follow Up and  recommended labs and tests   Follow up with Dr. Wang in 2-3 weeks.  No follow up labs or test are needed. Call for appointment [phone]     Activity - Up with assistive device     Weight bearing status   WBAT     Additional Discharge Instructions   Routine care of Valencia catheter .  Valencia catheter to gravity.  Voiding trial in 4-5 days, if retains, replace a catheter and needs referral to urology.     Physical Therapy Adult Consult   Evaluate and treat as clinically indicated.    Reason:  Left intertrochanteric femur fracture open reduction and internal fixation     Occupational Therapy Adult Consult   Evaluate and treat as clinically indicated.    Reason:  Left intertrochanteric femur fracture open reduction and internal fixation     Fall precautions     Advance Diet as Tolerated   Follow this diet upon discharge: Orders Placed This Encounter     Advance Diet as Tolerated: Regular Diet Adult     Advance Diet as Tolerated   Follow this diet upon discharge: Orders Placed This Encounter       Advance Diet as Tolerated: Regular Diet Adult       Discharge Medications   Current Discharge Medication List      START taking these medications    Details   acetaminophen (TYLENOL) 325 MG tablet Take 2 tablets (650 mg) by mouth every 6 hours as needed for mild pain  Qty: 40 tablet, Refills: 0    Associated Diagnoses: Closed nondisplaced intertrochanteric fracture of left femur, initial encounter (H)      senna-docusate (SENOKOT-S;PERICOLACE) 8.6-50 MG per tablet Take 2 tablets by mouth 2 times daily  Qty: 30 tablet, Refills: 0    Associated Diagnoses: Closed nondisplaced intertrochanteric fracture of left femur, initial encounter (H)      tamsulosin (FLOMAX) 0.4 MG capsule Take 1 capsule (0.4 mg) by mouth daily  Qty: 60 capsule    Associated Diagnoses: Urinary retention      traMADol (ULTRAM) 50 MG tablet Take 1 tablet (50 mg) by mouth every 6 hours as needed for moderate pain  Qty: 30 tablet, Refills: 0    Associated  Diagnoses: Closed nondisplaced intertrochanteric fracture of left femur, initial encounter (H)         CONTINUE these medications which have CHANGED    Details   amLODIPine (NORVASC) 2.5 MG tablet Take 1 tablet (2.5 mg) by mouth daily    Comments: Hold if SBP<100  Associated Diagnoses: Benign essential hypertension      aspirin 325 MG EC tablet Take 1 tablet (325 mg) by mouth daily  Qty: 45 tablet, Refills: 0    Associated Diagnoses: Closed nondisplaced intertrochanteric fracture of left femur, initial encounter (H)         CONTINUE these medications which have NOT CHANGED    Details   cyanocobalamin (VITAMIN  B-12) 1000 MCG tablet Take 1,000 mcg by mouth daily       gabapentin (NEURONTIN) 300 MG capsule Take 1,500 mg by mouth At Bedtime       loperamide (IMODIUM) 2 MG capsule Take 2 mg by mouth 2 times daily as needed for diarrhea      Multiple Vitamins-Minerals (PRESERVISION AREDS) TABS Take 1 tablet by mouth 2 times daily            Allergies   No Known Allergies  Data   Most Recent 3 CBC's:  Recent Labs   Lab Test  11/22/18   0652  11/21/18   0736  11/20/18   0634  11/19/18   0746   WBC   --    --    --   9.5   HGB  10.3*  10.4*  11.4*  12.8*   MCV   --    --    --   97   PLT  172   --    --   214      Most Recent 3 BMP's:  Recent Labs   Lab Test  11/23/18   0551  11/22/18 0652  11/21/18 0736  11/20/18 0634  11/19/18   0746   NA   --   143  144   --   145*   POTASSIUM   --   4.5  4.5   --   4.6   CHLORIDE   --   114*  114*   --   113*   CO2   --   24 23   --   26   BUN   --   44*  44*   --   41*   CR  2.20*  2.21*  2.38*  2.00*  2.15*   ANIONGAP   --   5  7   --   6   AGNIESZKA   --   8.0*  7.2*   --   8.0*   GLC   --   115*  121*  133*  111*     Most Recent 2 LFT's:No lab results found.  Most Recent INR's and Anticoagulation Dosing History:  Anticoagulation Dose History     Recent Dosing and Labs Latest Ref Rng & Units 1/26/2006    INR 0.86 - 1.14 0.98        Most Recent 3 Troponin's:No lab results  found.  Most Recent Cholesterol Panel:No lab results found.  Most Recent 6 Bacteria Isolates From Any Culture (See EPIC Reports for Culture Details):No lab results found.  Most Recent TSH, T4 and A1c Labs:No lab results found.  Results for orders placed or performed during the hospital encounter of 11/19/18   Head CT w/o contrast    Narrative    CT SCAN OF THE HEAD WITHOUT CONTRAST   11/19/2018 8:48 AM     HISTORY:  Head pain, fall.     TECHNIQUE:  Axial images of the head and coronal reformations without  IV contrast material. Radiation dose for this scan was reduced using  automated exposure control, adjustment of the mA and/or kV according  to patient size, or iterative reconstruction technique.    COMPARISON: None.    FINDINGS:  Mild volume loss is present. Geographic hypoattenuation and  volume loss within the left middle/inferior frontal gyrus is present  consistent with old infarct. No evidence of acute ischemia,  hemorrhage, mass, mass effect, or hydrocephalus. The visualized  calvarium, skull base, paranasal sinuses, and extracranial soft  tissues are unremarkable. Bilateral lens replacements are present.      Impression    IMPRESSION:  No acute intracranial abnormality. 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    PADDY LANDA MD   XR Pelvis w Hip Left 1 View    Narrative    PELVIS WITH LEFT HIP LATERAL TWO VIEWS  11/19/2018 8:35 AM     HISTORY: Left hip pain.     COMPARISON: None.      Impression    IMPRESSION:  Minimally displaced intertrochanteric fracture of the  left hip. There are degenerative changes of both hips, left worse than  right. Prostate seeds noted.    MALLORY BEAL MD   XR Chest 1 View    Narrative    CHEST ONE VIEW  11/19/2018 8:35 AM     HISTORY:  Trauma to left hip.     COMPARISON: 1/28/2006      Impression    IMPRESSION: Heart size upper normal but similar to prior. Pulmonary  vascularity within normal limits. The lungs are clear. No pneumothorax  or pleural effusion. There are lower left  lateral rib fractures of  uncertain chronicity.    MALLORY BEAL MD   XR Surgery IVANIA L/T 5 Min Fluoro w Stills    Narrative    XR SURGERY IVANIA FLUORO LESS THAN 5 MIN W STILLS 11/19/2018 4:40 PM     COMPARISON: Radiographs earlier on the same day.    HISTORY: ORIF Left Femur    NUMBER OF IMAGES ACQUIRED: 4    VIEWS: 4    FLUOROSCOPY TIME: .7      Impression    IMPRESSION: Intraoperative imaging during LEFT femur open reduction  and internal fixation. Hardware appears intact.    ORAL SNYDER MD   US Carotid Bilateral    Narrative    BILATERAL CAROTID ULTRASOUND   11/20/2018 4:11 PM     HISTORY:  Syncope.     COMPARISON: None.    RIGHT CAROTID FINDINGS:  Minimal plaque  Right ICA PSV:  105  cm/sec.  Right ICA EDV:  26 cm/sec.  Right ICA/CCA PSV Ratio:  1.0    These indicate less than 50% diameter stenosis of the right ICA.    Right Vertebral: Antegrade flow.   Right ECA: Antegrade flow.     LEFT CAROTID FINDINGS:  No plaque.  Left ICA PSV:  123  cm/sec.  Left ICA EDV:  34 cm/sec.  Left ICA/CCA PSV Ratio:  1.1    These indicate less than 50% diameter stenosis of the left ICA.    Left Vertebral: Antegrade flow.   Left ECA: Antegrade flow.     Causes of Decreased Accuracy:   None.       Impression    IMPRESSION:    1. Less than 50% diameter stenosis of the right ICA relative to the  distal ICA diameter.  2. Less than 50% diameter stenosis of the left ICA relative to the  distal ICA diameter.    SANTOSH ALICEA MD   XR Chest 2 Views    Narrative    XR CHEST 2 VW  11/20/2018 11:51 PM     HISTORY: Hypoxia.    COMPARISON: 11/19/2018.    FINDINGS: The heart is enlarged. There is pulmonary vascular  congestion. No pulmonary edema. There are small bilateral pleural  effusions. Mild left basilar infiltrate. No pneumothorax. Degenerative  disease in the spine.      Impression    IMPRESSION: Pulmonary vascular congestion. Small bilateral pleural  effusions.    GERSON JOHNSTON MD

## 2018-11-23 NOTE — PLAN OF CARE
Problem: Patient Care Overview  Goal: Plan of Care/Patient Progress Review  Outcome: No Change  Pt A&Ox2-3, VSS, CMS intact, pt up with assist of 1-2 to chair with walker, tolerating regular diet, tylenol and tramadol for pain management, abdi cath patent, dressing changed, incision C,D,I with staples.  Pt discharged to [facility] via wheelchair at 1415 today. Son accompanied patient.

## 2018-11-23 NOTE — PLAN OF CARE
Problem: Patient Care Overview  Goal: Plan of Care/Patient Progress Review  Outcome: Improving  Pt is AOX1, only to self, pleasantly confused. CMS intact, VSS, new dressing applied, pt pulled dressing off by self. Tele - SR with BBB. Valencia patent. Adequate output. Pending discharge to TCU.

## 2018-11-23 NOTE — PLAN OF CARE
Problem: Patient Care Overview  Goal: Plan of Care/Patient Progress Review  Physical Therapy Discharge Summary    Reason for therapy discharge:    Discharged to transitional care facility.    Progress towards therapy goal(s). See [facility] on [facility] Plan [facility] health record for goal details.  Goals not met.  Barriers to achieving goals:   discharge from facility.    Therapy recommendation(s):    Continued therapy is recommended.  Rationale/Recommendations:  Pt would benefit from continued skilled PT services to progress functional strength, balance, activity tolerance, safety and IND with functional mobility prior to returning home.

## 2018-11-24 ENCOUNTER — HOSPITAL ENCOUNTER (EMERGENCY)
Facility: CLINIC | Age: 83
Discharge: NURSING, REHAB OR LONG-TERM CARE | End: 2018-11-24
Attending: EMERGENCY MEDICINE | Admitting: EMERGENCY MEDICINE
Payer: MEDICARE

## 2018-11-24 ENCOUNTER — APPOINTMENT (OUTPATIENT)
Dept: GENERAL RADIOLOGY | Facility: CLINIC | Age: 83
End: 2018-11-24
Attending: EMERGENCY MEDICINE
Payer: MEDICARE

## 2018-11-24 VITALS
RESPIRATION RATE: 12 BRPM | HEIGHT: 70 IN | HEART RATE: 71 BPM | DIASTOLIC BLOOD PRESSURE: 65 MMHG | SYSTOLIC BLOOD PRESSURE: 137 MMHG | OXYGEN SATURATION: 98 % | WEIGHT: 180 LBS | TEMPERATURE: 97.9 F | BODY MASS INDEX: 25.77 KG/M2

## 2018-11-24 DIAGNOSIS — R09.02 HYPOXIA: ICD-10-CM

## 2018-11-24 LAB
BASOPHILS # BLD AUTO: 0 10E9/L (ref 0–0.2)
BASOPHILS NFR BLD AUTO: 0.2 %
DIFFERENTIAL METHOD BLD: ABNORMAL
EOSINOPHIL # BLD AUTO: 0.2 10E9/L (ref 0–0.7)
EOSINOPHIL NFR BLD AUTO: 2 %
ERYTHROCYTE [DISTWIDTH] IN BLOOD BY AUTOMATED COUNT: 14.4 % (ref 10–15)
HCT VFR BLD AUTO: 29.8 % (ref 40–53)
HGB BLD-MCNC: 9.8 G/DL (ref 13.3–17.7)
IMM GRANULOCYTES # BLD: 0.1 10E9/L (ref 0–0.4)
IMM GRANULOCYTES NFR BLD: 0.9 %
INTERPRETATION ECG - MUSE: NORMAL
LYMPHOCYTES # BLD AUTO: 1.1 10E9/L (ref 0.8–5.3)
LYMPHOCYTES NFR BLD AUTO: 8.9 %
MCH RBC QN AUTO: 31.6 PG (ref 26.5–33)
MCHC RBC AUTO-ENTMCNC: 32.9 G/DL (ref 31.5–36.5)
MCV RBC AUTO: 96 FL (ref 78–100)
MONOCYTES # BLD AUTO: 1.8 10E9/L (ref 0–1.3)
MONOCYTES NFR BLD AUTO: 14.4 %
NEUTROPHILS # BLD AUTO: 9 10E9/L (ref 1.6–8.3)
NEUTROPHILS NFR BLD AUTO: 73.6 %
NRBC # BLD AUTO: 0 10*3/UL
NRBC BLD AUTO-RTO: 0 /100
PLATELET # BLD AUTO: 239 10E9/L (ref 150–450)
RBC # BLD AUTO: 3.1 10E12/L (ref 4.4–5.9)
WBC # BLD AUTO: 12.2 10E9/L (ref 4–11)

## 2018-11-24 PROCEDURE — 99285 EMERGENCY DEPT VISIT HI MDM: CPT | Mod: 25

## 2018-11-24 PROCEDURE — 71046 X-RAY EXAM CHEST 2 VIEWS: CPT

## 2018-11-24 PROCEDURE — 93005 ELECTROCARDIOGRAM TRACING: CPT

## 2018-11-24 PROCEDURE — 85025 COMPLETE CBC W/AUTO DIFF WBC: CPT | Performed by: EMERGENCY MEDICINE

## 2018-11-24 ASSESSMENT — ENCOUNTER SYMPTOMS
ACTIVITY CHANGE: 1
SHORTNESS OF BREATH: 0

## 2018-11-24 NOTE — ED NOTES
Bed: ED19  Expected date:   Expected time:   Means of arrival:   Comments:  Michaela 2 Low sats 89 male

## 2018-11-24 NOTE — ED AVS SNAPSHOT
Emergency Department    [address] [place] [address]    Phone:  [phone]    Fax:  [phone]                                       Sourav Fine   MRN: [id number]    Department:   Emergency Department   Date of Visit:  11/24/2018           Patient Information     Date Of Birth          11/15/1925        Your diagnoses for this visit were:     Hypoxia due to sleep apnea       You were seen by Gume Muse MD.      Follow-up Information     Follow up with Nick Finney MD.    Specialty:  Family Practice    Contact information:    [facility]  [address] [place] [address]  [phone]          Discharge Instructions       Oxygen 2-4L by nasal canula to keep oxygen sats >92%    24 Hour Appointment Hotline       To make an appointment at any [facility], call [phone] ([phone]). If you don't have a family doctor or clinic, we will help you find one. [facility] are conveniently located to serve the needs of you and your family.             Review of your medicines      Our records show that you are taking the medicines listed below. If these are incorrect, please call your family doctor or clinic.        Dose / Directions Last dose taken    acetaminophen 325 MG tablet   Commonly known as:  TYLENOL   Dose:  650 mg   Quantity:  40 tablet        Take 2 tablets (650 mg) by mouth every 6 hours as needed for mild pain   Refills:  0        amLODIPine 2.5 MG tablet   Commonly known as:  NORVASC   Dose:  2.5 mg        Take 1 tablet (2.5 mg) by mouth daily   Refills:  0        aspirin 325 MG EC tablet   Commonly known as:  ASA   Dose:  325 mg   Quantity:  45 tablet        Take 1 tablet (325 mg) by mouth daily   Refills:  0        cyanocobalamin 1000 MCG tablet   Commonly known as:  vitamin  B-12   Dose:  1000 mcg        Take 1,000 mcg by mouth daily   Refills:  0        gabapentin 300 MG capsule   Commonly known as:  NEURONTIN   Dose:  1500 mg        Take 1,500 mg  by mouth At Bedtime   Refills:  0        loperamide 2 MG capsule   Commonly known as:  IMODIUM   Dose:  2 mg        Take 2 mg by mouth 2 times daily as needed for diarrhea   Refills:  0        PRESERVISION AREDS Tabs   Dose:  1 tablet        Take 1 tablet by mouth 2 times daily   Refills:  0        senna-docusate 8.6-50 MG per tablet   Commonly known as:  SENOKOT-S;PERICOLACE   Dose:  2 tablet   Quantity:  30 tablet        Take 2 tablets by mouth 2 times daily   Refills:  0        tamsulosin 0.4 MG capsule   Commonly known as:  FLOMAX   Dose:  0.4 mg   Quantity:  60 capsule        Take 1 capsule (0.4 mg) by mouth daily   Refills:  0        traMADol 50 MG tablet   Commonly known as:  ULTRAM   Dose:  50 mg   Quantity:  30 tablet        Take 1 tablet (50 mg) by mouth every 6 hours as needed for moderate pain   Refills:  0                Procedures and tests performed during your visit     CBC with platelets + differential    EKG 12 lead    Peripheral IV: Standard    Review medications with patient    XR Chest 2 Views      Orders Needing Specimen Collection     None      Pending Results     Date and Time Order Name Status Description    11/24/2018 0856 XR Chest 2 Views Preliminary             Pending Culture Results     No orders found from 11/22/2018 to 11/25/2018.            Pending Results Instructions     If you had any lab results that were not finalized at the time of your Discharge, you can call the ED Lab Result RN at [phone]. You will be contacted by this team for any positive Lab results or changes in treatment. The nurses are available 7 days a week from 10A to 6:30P.  You can leave a message 24 hours per day and they will return your call.        Test Results From Your Hospital Stay        11/24/2018  9:31 AM      Narrative     CHEST TWO VIEWS  11/24/2018 9:23 AM     COMPARISON: Two view chest x-ray 11/20/2018    HISTORY: Shortness of breath.        Impression     IMPRESSION: Interstitial prominence and  pulmonary vascular congestion  have improved from the comparison study. Tiny bilateral pleural  effusions again noted. There are no airspace opacities to suggest  pneumonia. Heart size remains minimally enlarged. There is no  pneumothorax.         11/24/2018  9:22 AM      Component Results     Component Value Ref Range & Units Status    WBC 12.2 (H) 4.0 - 11.0 10e9/L Final    RBC Count 3.10 (L) 4.4 - 5.9 10e12/L Final    Hemoglobin 9.8 (L) 13.3 - 17.7 g/dL Final    Hematocrit 29.8 (L) 40.0 - 53.0 % Final    MCV 96 78 - 100 fl Final    MCH 31.6 26.5 - 33.0 pg Final    MCHC 32.9 31.5 - 36.5 g/dL Final    RDW 14.4 10.0 - 15.0 % Final    Platelet Count 239 150 - 450 10e9/L Final    Diff Method Automated Method  Final    % Neutrophils 73.6 % Final    % Lymphocytes 8.9 % Final    % Monocytes 14.4 % Final    % Eosinophils 2.0 % Final    % Basophils 0.2 % Final    % Immature Granulocytes 0.9 % Final    Nucleated RBCs 0 0 /100 Final    Absolute Neutrophil 9.0 (H) 1.6 - 8.3 10e9/L Final    Absolute Lymphocytes 1.1 0.8 - 5.3 10e9/L Final    Absolute Monocytes 1.8 (H) 0.0 - 1.3 10e9/L Final    Absolute Eosinophils 0.2 0.0 - 0.7 10e9/L Final    Absolute Basophils 0.0 0.0 - 0.2 10e9/L Final    Abs Immature Granulocytes 0.1 0 - 0.4 10e9/L Final    Absolute Nucleated RBC 0.0  Final                Clinical Quality Measure: Blood Pressure Screening     Your blood pressure was checked while you were in the emergency department today. The last reading we obtained was  BP: 140/66 . Please read the guidelines below about what these numbers mean and what you should do about them.  If your systolic blood pressure (the top number) is less than 120 and your diastolic blood pressure (the bottom number) is less than 80, then your blood pressure is normal. There is nothing more that you need to do about it.  If your systolic blood pressure (the top number) is 120-139 or your diastolic blood pressure (the bottom number) is 80-89, your blood  "pressure may be higher than it should be. You should have your blood pressure rechecked within a year by a primary care provider.  If your systolic blood pressure (the top number) is 140 or greater or your diastolic blood pressure (the bottom number) is 90 or greater, you may have high blood pressure. High blood pressure is treatable, but if left untreated over time it can put you at risk for heart attack, stroke, or kidney failure. You should have your blood pressure rechecked by a primary care provider within the next 4 weeks.  If your provider in the emergency department today gave you specific instructions to follow-up with your doctor or provider even sooner than that, you should follow that instruction and not wait for up to 4 weeks for your follow-up visit.        Thank you for choosing [facility]       Thank you for choosing [facility] for your care. Our goal is always to provide you with excellent care. Hearing back from our patients is one way we can continue to improve our services. Please take a few minutes to complete the written survey that you may receive in the mail after you visit with us. Thank you!        [employer] Information     [employer] lets you send messages to your doctor, view your test results, renew your prescriptions, schedule appointments and more. To sign up, go to [email or web address] . Click on \"Log in\" on the left side of the screen, which will take you to the Welcome page. Then click on \"Sign up Now\" on the right side of the page.     You will be asked to enter the access code listed below, as well as some personal information. Please follow the directions to create your username and password.     Your access code is: [email or web address]  Expires: 2019 10:32 AM     Your access code will  in 90 days. If you need help or a new code, please call your [facility] clinic or [phone].        Care EveryWhere ID     This is your Care EveryWhere ID. This could be used by other organizations " to access your [place] medical records  [id number]        Equal Access to Services     MERYL GUARDADO : Tisha Pickens, addy rosen, stephen calhoun. So [facility] [phone].    ATENCIÓN: Si habla español, tiene a brown disposición servicios gratuitos de asistencia lingüística. Llame al [phone].    We comply with applicable federal civil rights laws and [place] laws. We do not discriminate on the basis of race, color, national origin, age, disability, sex, sexual orientation, or gender identity.            After Visit Summary       This is your record. Keep this with you and show to your community pharmacist(s) and doctor(s) at your next visit.

## 2018-11-24 NOTE — ED PROVIDER NOTES
History     Chief Complaint:  Generalized weakness    HPI   Sourav Fine is a 89 year old male with a history of hypertension and stroke who presents with Generalized weakness. This morning at [facility] the patient felt weak and lost control of stool. The patients oxygen saturation levels also dropped into the 60s and 70s according to a nurse at the facility. EMS says the patients stats were around 90-92 and that the patient denied shortness of breath or pain. Upon arrival the patient appears sleepy but arousable to voice.    Allergies:  The patient has no known drug allergies.    Medications:    Norvasc  Aspirin  Neurontin  Imodium  Pericolace  Flomax  Ultram    Past Medical History:    Arthritis  Hypertension  Non allopathic lesion of cervical region  Cervicalgia   Essential hypertension  Neuropathy  Erectile dysfunction  Malignant neoplasm of skin  ILIR  CVA  Low back pain  Left femur fracture  Malignant neoplasm of prostate  Malignant neoplasm of kidney    Past Surgical History:    Orthopedic surgery    Family History:    No past pertinent family history.    Social History:  Marital status:   Smoker: former  Smokeless: Never  Alcohol: yes little bit of wine daily    Review of Systems   Constitutional: Positive for activity change.   Respiratory: Negative for shortness of breath.    Cardiovascular: Negative for chest pain.   All other systems reviewed and are negative.    Physical Exam     Patient Vitals for the past 24 hrs:   BP Temp Temp src Pulse Resp SpO2 Height Weight   11/24/18 1045 - - - - - 98 % - -   11/24/18 1030 153/74 - - - - 99 % - -   11/24/18 1015 - - - - - 90 % - -   11/24/18 1000 155/67 - - - - 98 % - -   11/24/18 0945 - - - - - 98 % - -   11/24/18 0930 140/66 - - - - - - -   11/24/18 0915 124/69 - - - - - - -   11/24/18 0900 138/68 - - - - 99 % - -   11/24/18 0853 - - - - - 98 % - -   11/24/18 0852 131/67 - - - - - - -   11/24/18 0845 - - - - - 94 % - -   11/24/18 0840 117/58 97.9  F  "(36.6  C) Oral 71 20 (!) 86 % 1.778 m (5' 10\") 81.6 kg (180 lb)     Physical Exam  Constitutional: The patient is oriented to person, place, and time. Sleepy but arousable to voice.  HENT:   Head: Old abrasion on occiput  Right Ear: Normal  Left Ear: Normal  Nose: Nose normal.   Mouth/Throat: Oropharynx is clear and moist. No erythema or exudate.   Eyes: Conjunctivae and EOM are normal. Pupils are equal, round, and reactive to light. No discharge  Neck: Normal range of motion. Neck supple.   Cardiovascular: Normal rate, regular rhythm, no murmur gallops or rubs. Intact distal pulses.    Pulmonary/Chest: CTA bilaterally. No wheezes rale or rhonchi.  Abdominal: Soft. Non tender.  No masses   Musculoskeletal: No edema. No bony deformity. Normal range of motion  Lymphadenopathy:     The patient has no cervical adenopathy.   Neurological: The patient is alert and oriented to person, place, and time. The patient has normal strength and normal reflexes. No cranial nerve deficit. Coordination normal.  Skin: Skin is warm and dry. No rash noted. The patient is not diaphoretic. Old abrasion on occiput. Old abrasions on both knees.  Psychiatric: The patient has a normal mood and affect.    Emergency Department Course   ECG:  Time: 0837  Vent. Rate 75 bpm. KY interval 166. QRS duration 140. QT/QTc 430/480. P-R-T axis 74 -66 62. Normal sinus rhythm. Right bundle branch block. Left anterior fascicular block. ** bifascicular block **. Abnormal ECG Read time: 0857    Imaging:  Radiographic findings were communicated with the patient who voiced understanding of the findings.  XR Chest 2 views:   Interstitial prominence and pulmonary vascular congestion  have improved from the comparison study. Tiny bilateral pleural  effusions again noted. There are no airspace opacities to suggest  pneumonia. Heart size remains minimally enlarged. There is no  pneumothorax. As per radiology.     Laboratory:  CBC: WBC: 12.2 (H), HGB: 9.8 (L), PLT: " 239    Emergency Department Course:  Nursing notes and vitals reviewed. (0834) I performed an exam of the patient as documented above.     IV inserted. Blood drawn. This was sent to the lab for further testing, results above.    The patient was sent for a chest XR while in the emergency department, findings above.     (0904) I rechecked the patient and discussed the results of his workup thus far.     Findings and plan explained to the Patient. Patient discharged home with instructions regarding supportive care, medications, and reasons to return. The importance of close follow-up was reviewed.    I personally reviewed the laboratory results with the Patient and answered all related questions prior to discharge.    Impression & Plan           Medical Decision Making:  Sourav Fine is a 89 year old male who presents from his TCU where he was admitted yesterday after being in the hospital for a hip fracture because of a hypoxia assessment this morning. The patient himself does not have significant complaints. He is somewhat asleep which I think is likely secondary to his pain medications. O2 stats for us are in the low 90s when he is awake and conversant when he falls asleep he does drop into the 80s but this pops up into the mid to high 90s with just 2-4 liters by nasal canula. In review of his chart he does have obstructive sleep apnea which I suspect is likely the cause of his hypoxia. There are no signs of worsening heart failure or pneumonia on his chest ex ray. His anemia is stable and he is afebrile. He does have slight elevation in his white count which is very nonspecific. At this point I feel he can be safely returned to the TCU. I did order oxygen of about 2-4 liters to be used to keep O2 stats greater than 92 percent. The patient should contact their primary care physician for CPAP orders as needed.    Diagnosis:    ICD-10-CM    1. Hypoxia R09.02     due to sleep apnea       Disposition:  discharged to  home    Scribe Disclosure:  I, Mannie Mcfarland, am serving as a scribe on 11/24/2018 at 8:34 AM to personally document services performed by No att. providers found based on my observations and the provider's statements to me.     Mannie Mcfarland  11/24/2018    EMERGENCY DEPARTMENT       Gume Muse MD  11/24/18 6035

## 2018-11-24 NOTE — ED NOTES
"Call placed to Yulia nurse at [facility]. She updated RN on situation at nursing home, stating \"he said he didn't feel like himself. He appeared lethargic and his O2 sats were 78% for almost 30min. He had a bowel movement on the floor in front of me when I first walked in this morning. I called our attending who wanted him sent back to the ER.\" Writer updated her with his hx of sleep apnea and refusal of offered CPAP at discharge. She is requesting an order that he be moved to a room closer to the nurses station. Writer spoke with MD who said that would be possible. Writer suggested Yulia speak to their on-call attending in case the tramadol was contributing to the behaviors, perhaps they could change to a different pain medication. She verbalized that would be feasible.     Yulia called back a few minutes later and stated per their charge nurse they were going to refuse to take him back \"we don't have the staff to deal with him. He'll need to find different placement.\" ER MD updated. ER Charge RN updated and will call facility as he is medically cleared per ER MD.   "

## 2018-11-24 NOTE — ED NOTES
Charge RN Anh called Carley nurse at [facility] who informed her that Yulia (nurse at [facility]) told her Sourav was a 1:1 at [facility]. This was never the case and was never indicated in the conversation the writer had with Yulia. Pt has had no behaviors here, sitting calmly in bed watching tv. His O2 sats are 99% currently on 2L O2 NC. [facility] is agreeable to taking him back on their unit as he is not and never has required 1:1 attention by nursing staff at [facility]. She has no questions regarding the given report.

## 2018-11-24 NOTE — ED AVS SNAPSHOT
Emergency Department    [address] [place] [address]    Phone:  [phone]    Fax:  [phone]                                       Sourav Fine   MRN: [id number]    Department:   Emergency Department   Date of Visit:  11/24/2018           After Visit Summary Signature Page     I have received my discharge instructions, and my questions have been answered. I have discussed any challenges I see with this plan with the nurse or doctor.    ..........................................................................................................................................  Patient/Patient Representative Signature      ..........................................................................................................................................  Patient Representative Print Name and Relationship to Patient    ..................................................               ................................................  Date                                   Time    ..........................................................................................................................................  Reviewed by Signature/Title    ...................................................              ..............................................  Date                                               Time          22EPIC Rev 08/18

## 2018-11-24 NOTE — ED NOTES
Family arrived and have been updated on status and POC. They will bring his home CPAP to [facility]. [facility] has arrived to transport pt back. VSS. He appears tired but arouses to voice and answers questions appropriately.

## 2018-11-24 NOTE — ED NOTES
Called to talk with the charge nurse at [facility].  Informed her that I talked with Dr. Muse about the pt.  Dr. Muse said the pt was medically cleared to go back to the facility and that he would write an order for oxygen.  I informed the charge nurse of this and she agreed to take the pt back.  The pt has not been a 1:1 pt to nurse care here.  He has been fine on oxygen.  He has sleep apnea and does fine with that as long as he is on oxygen when sleeping per Dr. Muse.

## 2018-11-24 NOTE — ED NOTES
"Writer is unable to reach wife, phone number listed in chart is inaccurate. Message plays \"phone number is disconnected\" when called.   "

## 2018-11-26 ENCOUNTER — NURSING HOME VISIT (OUTPATIENT)
Dept: GERIATRICS | Facility: CLINIC | Age: 83
End: 2018-11-26
Payer: COMMERCIAL

## 2018-11-26 VITALS
BODY MASS INDEX: 25.78 KG/M2 | WEIGHT: 180.1 LBS | TEMPERATURE: 98 F | RESPIRATION RATE: 22 BRPM | SYSTOLIC BLOOD PRESSURE: 142 MMHG | HEART RATE: 72 BPM | DIASTOLIC BLOOD PRESSURE: 71 MMHG | OXYGEN SATURATION: 97 % | HEIGHT: 70 IN

## 2018-11-26 VITALS
RESPIRATION RATE: 18 BRPM | OXYGEN SATURATION: 97 % | BODY MASS INDEX: 25.24 KG/M2 | DIASTOLIC BLOOD PRESSURE: 69 MMHG | SYSTOLIC BLOOD PRESSURE: 161 MMHG | TEMPERATURE: 98.3 F | HEART RATE: 74 BPM | HEIGHT: 70 IN | WEIGHT: 176.3 LBS

## 2018-11-26 DIAGNOSIS — Z71.89 ADVANCED DIRECTIVES, COUNSELING/DISCUSSION: ICD-10-CM

## 2018-11-26 DIAGNOSIS — M62.81 MUSCLE WEAKNESS (GENERALIZED): ICD-10-CM

## 2018-11-26 DIAGNOSIS — D62 ANEMIA DUE TO BLOOD LOSS, ACUTE: ICD-10-CM

## 2018-11-26 DIAGNOSIS — G47.33 OSA (OBSTRUCTIVE SLEEP APNEA): ICD-10-CM

## 2018-11-26 DIAGNOSIS — G62.9 PERIPHERAL POLYNEUROPATHY: ICD-10-CM

## 2018-11-26 DIAGNOSIS — R33.9 URINARY RETENTION WITH INCOMPLETE BLADDER EMPTYING: ICD-10-CM

## 2018-11-26 DIAGNOSIS — R55 SYNCOPE, UNSPECIFIED SYNCOPE TYPE: Primary | ICD-10-CM

## 2018-11-26 DIAGNOSIS — Z90.5 HISTORY OF NEPHRECTOMY: ICD-10-CM

## 2018-11-26 DIAGNOSIS — R79.89 LOW VITAMIN B12 LEVEL: ICD-10-CM

## 2018-11-26 DIAGNOSIS — S72.145S CLOSED NONDISPLACED INTERTROCHANTERIC FRACTURE OF LEFT FEMUR, SEQUELA: ICD-10-CM

## 2018-11-26 DIAGNOSIS — N18.4 CHRONIC KIDNEY DISEASE, STAGE 4 (SEVERE) (H): ICD-10-CM

## 2018-11-26 DIAGNOSIS — I10 ESSENTIAL HYPERTENSION: ICD-10-CM

## 2018-11-26 DIAGNOSIS — Z86.73 HISTORY OF CVA (CEREBROVASCULAR ACCIDENT): ICD-10-CM

## 2018-11-26 PROCEDURE — 99310 SBSQ NF CARE HIGH MDM 45: CPT | Performed by: NURSE PRACTITIONER

## 2018-11-26 NOTE — PROGRESS NOTES
[facility]  PRIMARY CARE PROVIDER AND CLINIC:  [facility] [address] / [place] [address]  Chief Complaint   Patient presents with     Hospital F/U     [place] [facility] Record Number:  [id number]  Place of Service where encounter took place:  [facility] [[id number]]    HPI:    Sourav Fine is a 89 year old  (11/15/1925),admitted to the above facility from  [facility].  Hospital stay 11/19/2018 through 11/23/2018, and [facility] ED 11/24/2018.  Admitted to this facility for  rehab, medical management and nursing care.  HPI information obtained from: facility chart records, facility staff, patient report and [facility] chart review.     Hospital Course  Sourav Fine was admitted on 11/19/2018.  The following problems were addressed during his hospitalization:  Syncope  Etiology unclear, highest suspicion for hypovolemia given prodrome and hypernatremia at admission. He was on Amlodipine and BP as well was soft, so could be orthostatic hypotension as well. Also with recurrent event when got up with PT 11/20 (orthostatics unfortunately not checked).  Denied cardiorespiratory symptoms with non-ischemic EKG (trops not trended).  TTE 11/19 with EF 60-65% without WMA or significant valvular disease.  Head CT unremarkable for acute pathology, but does show known old left frontal infarct.  Neuro exam non-focal.  Carotid US 11/21 negative for stenosis.  Tele negative for arrhythmia. Symptoms reported not consistent with seizure. Sustained Lt hip fracture which was treated with ORIF, going to TCU, continue to monitor. Fall risk. His PTA amlodipine was decreased to half at discharge.   Acute hypoxic resp failure due to volume overload and atelectasis.   Hypoxic overnight 11/20-11/21 after initiation of IVF. CXR with pulmonary edema and mild pleural effusions. Received lasix 40 mg IV x1 with good response.  - oxygen requirements improved,  weaned off supplemental O2  - I/O's even to slightly negative.   - Diuretic not continued since weaned off oxygen, he was intravascularly dry when he presented, and given poor oral intake.  - Has temp of 100.2, suspect atelectasis, patient is forgetful, Continue to encourage incentive spirometry at TCU as well after discharge, discussed with nursing staff to remind him frequently.  Left intertrochanteric hip fracture s/p ORIF 11/19  Due to fall. Procedure uncomplicated.    - post-op managed per Ortho  - pain well controlled, and doing well with therapy and discharging to rehab. (TCU)  Acute blood loss anemia  -hgb 12.8 > 11.4 > 10.4>10.3    -Hb stable.  HTN  Placed on Norvasc 5 mg daily 2 weeks ago, but stopped on own due to dizziness.  - Amlodipine resumed at lower dose 2.5 mg daily.  ILIR  Pt declines CPAP at this time, available PRN   CKD IV  History of right radical nephrectomy  Baseline creatinine 2.1-2.2. Creatinine on admission 2.15.   -Cr up slightly 11/21 at 2.38; and is 2.21>> 2.20 which is near his baseline today.  -Light increase in creatinine could also be due to urinary retention.  Now has Valencia catheter.  Urinary retention:  Post op developed urinary retention.  Unable to obtain any symptoms consistent with BPH but likely has it given his age.  -Valencia catheter was placed 11/21, Flomax started  -Voiding trial at TCU, if retention, needs urology referral.  CVA  Left frontal lobe infarction in 5/2018. Head CT on admission showing an old left frontal infarct. No residual deficits.   Peripheral neuropathy   Vitamin B12 deficiency  - Continue PTA gabapentin  Chronic diarrhea  Seen by GI in the outpatient setting. Hold antidiarrheal medication while on narcotics.    Current issues are:      Syncope, unspecified syncope type  Essential hypertension  History of CVA (cerebrovascular accident)  Now at TCU for therapies. Since admission denies dizziness or syncope. BP Range: 127-142/64-74, HR Range: 72-92 bpm, Wt  Range:  180lbs 11/23 - 180.1lbs 11/24. Continues on norvasc 2.5 mg PO daily as well as  mg daily.     Closed nondisplaced intertrochanteric fracture of left femur, sequela  Muscle weakness (generalized)  Reports pain in left leg moderate to severe - comfortable with PRN pain medications, Tylenol and tramadol. WBAT left leg - intact staples, he is to see ortho in 2-3 weeks Dr Wang.     Anemia due to blood loss, acute  Stable on recent check   Lab Results   Component Value Date    HGB 9.8 11/24/2018    HGB 10.3 11/22/2018       ILIR (obstructive sleep apnea)  Does not use CPAP. On o2 at this time 2 lpm per NC and sats 97% - wean as able.     Chronic kidney disease, stage 4 (severe) (H)  History of nephrectomy  Chronic issue with recent Cr stable, will f/u with BMP this week.   Lab Results   Component Value Date    CR 2.20 11/23/2018    CR 2.21 11/22/2018       Peripheral polyneuropathy  Low vitamin B12 level  Asymptomatic today. Managed with Neurontin and cyanocobalamin.     Urinary retention with incomplete bladder emptying  Valencia in place - will attempt voiding later this week, urology f/u if not successful.     Advanced directives, counseling/discussion  Reviewed POLST - patient requests to be DNR.     CODE STATUS/ADVANCE DIRECTIVES DISCUSSION:   DNR   Patient's living condition: lives with spouse    ALLERGIES:Review of patient's allergies indicates no known allergies.  PAST MEDICAL HISTORY:  has a past medical history of Arthritis and Hypertension.  PAST SURGICAL HISTORY:  has a past surgical history that includes orthopedic surgery and Open reduction internal fixation hip nailing (Left, 11/19/2018).  FAMILY HISTORY: family history is not on file.  SOCIAL HISTORY:  reports that he quit smoking about 29 years ago. He has never used smokeless tobacco. He reports that he drinks alcohol. He reports that he does not use illicit drugs.    Post Discharge Medication Reconciliation Status: discharge medications  "reconciled, continue medications without change.  Current Outpatient Prescriptions   Medication Sig Dispense Refill     acetaminophen (TYLENOL) 325 MG tablet Take 2 tablets (650 mg) by mouth every 6 hours as needed for mild pain 40 tablet 0     amLODIPine (NORVASC) 2.5 MG tablet Take 1 tablet (2.5 mg) by mouth daily       aspirin 325 MG EC tablet Take 1 tablet (325 mg) by mouth daily 45 tablet 0     cyanocobalamin (VITAMIN  B-12) 1000 MCG tablet Take 1,000 mcg by mouth daily        gabapentin (NEURONTIN) 300 MG capsule Take 1,500 mg by mouth At Bedtime        loperamide (IMODIUM) 2 MG capsule Take 2 mg by mouth 2 times daily as needed for diarrhea       Multiple Vitamins-Minerals (PRESERVISION AREDS) TABS Take 1 tablet by mouth 2 times daily        senna-docusate (SENOKOT-S;PERICOLACE) 8.6-50 MG per tablet Take 2 tablets by mouth 2 times daily 30 tablet 0     tamsulosin (FLOMAX) 0.4 MG capsule Take 1 capsule (0.4 mg) by mouth daily 60 capsule      traMADol (ULTRAM) 50 MG tablet Take 1 tablet (50 mg) by mouth every 6 hours as needed for moderate pain 30 tablet 0       ROS:  10 point ROS of systems including Constitutional, Eyes, Respiratory, Cardiovascular, Gastroenterology, Genitourinary, Integumentary, Musculoskeletal, Psychiatric were all negative except for pertinent positives noted in my HPI.    Exam:  /71  Pulse 72  Temp 98  F (36.7  C)  Resp 22  Ht 5' 10\" (1.778 m)  Wt 180 lb 1.6 oz (81.7 kg)  SpO2 97%  BMI 25.84 kg/m2  GENERAL APPEARANCE:  Alert, in no distress, pleasant, cooperative, oriented x self, place, recent events  EYES:  EOM, lids, pupils and irises normal, sclera clear and conjunctiva normal, no discharge or mattering on lids or lashes noted  ENT:  Mouth normal, moist mucous membranes, nose normal without drainage or crusting, external ears without lesions, hearing acuity intact  NECK: supple, symmetrical  RESP:  respiratory effort and palpation of chest normal, no chest wall " tenderness, no respiratory distress, Lung sounds diminished at bases, patient is on oxygen per NC  CV:  Palpation and auscultation of heart done, rate and rhythm controlled and regular, no murmur, no rub or gallop. Edema none right leg and trace left ankle/foot.   ABDOMEN:  normal bowel sounds, soft, nontender.  M/S:   Gait and station walks with assist , no tenderness or swelling of the joints; able to move all extremities   SKIN:  Inspection and palpation of skin and subcutaneous tissue: left hip incisions open to air, staples intact, fading bruising and no redness/drainage/warmth  NEURO: cranial nerves 2-12 grossly intact, no facial asymmetry, no speech deficits and able to follow directions, moves all extremities symmetrically  PSYCH:  insight and judgement at baseline, memory appears intact, affect and mood normal     Lab/Diagnostic data:  CBC RESULTS:   Recent Labs   Lab Test  11/24/18   0852  11/22/18   0652   11/19/18   0746   WBC  12.2*   --    --   9.5   RBC  3.10*   --    --   4.14*   HGB  9.8*  10.3*   < >  12.8*   HCT  29.8*   --    --   40.0   MCV  96   --    --   97   MCH  31.6   --    --   30.9   MCHC  32.9   --    --   32.0   RDW  14.4   --    --   14.0   PLT  239  172   --   214    < > = values in this interval not displayed.       Last Basic Metabolic Panel:  Recent Labs   Lab Test  11/23/18   0551  11/22/18   0652  11/21/18   0736   NA   --   143  144   POTASSIUM   --   4.5  4.5   CHLORIDE   --   114*  114*   AGNIESZKA   --   8.0*  7.2*   CO2   --   24  23   BUN   --   44*  44*   CR  2.20*  2.21*  2.38*   GLC   --   115*  121*     ASSESSMENT/PLAN:  Syncope, unspecified syncope type  Essential hypertension  History of CVA (cerebrovascular accident)  Acute on chronic issues. Monitor vs and meds as above. Therapies.     Closed nondisplaced intertrochanteric fracture of left femur, sequela  Muscle weakness (generalized)  Acute onset with injury, surgery. Pain meds as ordered and f/u ortho 2-3 weeks.  Therapies - f/u with progress next week.     Anemia due to blood loss, acute  Acute onset. CBC this week and f/u with results.     ILIR (obstructive sleep apnea)  Chronic, now needing o2 - wean off as able.     Chronic kidney disease, stage 4 (severe) (H)  History of nephrectomy  Chronic, stable last heck. BMP this week. Avoid nephrotoxic meds.     Peripheral polyneuropathy  Chronic, well managed with current meds. Monitor.     Low vitamin B12 level  Chronic, on supplements. No changes.     Urinary retention with incomplete bladder emptying  Acute with surgery, pain meds, decreased mobility. Meds and abdi as above. Voiding trial later this week - urology f/u if unsuccessful.     Advanced directives, counseling/discussion  Reviewed POLST - DNR per patient wishes.     Orders:  1. DNR  2. Incentive Spirometry 10 repetitions every 2 hrs while awake - may self administer after instructions  3. Wean off o2 as able  4. BMP, CBC on 11/29 diagnosis CKD, anemia  5. Remove abdi in AM on 11/29. Check PVR every shift x 2 days. If retains over 300 ml straight cath. If need to cath third time - place Abdi 16 Fr 10 ml balloon and schedule with urology for f/u      Total time spent with patient visit at the skilled nursing facility was 40 min including patient visit and review of past records. Greater than 50% of total time spent with counseling and coordinating care due to review of history, current status, problems and concerns and POC to address them as noted above    Electronically signed by:  JUAN PABLO Cohen CNP

## 2018-11-26 NOTE — LETTER
11/26/2018        RE: Sourav Fine  [address]  [place] [address]        [facility]  PRIMARY CARE PROVIDER AND CLINIC:  [facility] [address] / [place] [address]  Chief Complaint   Patient presents with     Hospital F/U     [place] Medical Record Number:  [id number]  Place of Service where encounter took place:  [facility] [[id number]]    HPI:    Sourav Fine is a 89 year old  (11/15/1925),admitted to the above facility from  [facility].  Hospital stay 11/19/2018 through 11/23/2018, and [facility] ED 11/24/2018.  Admitted to this facility for  rehab, medical management and nursing care.  HPI information obtained from: facility chart records, facility staff, patient report and [facility] chart review.     Hospital Course  Sourav Fine was admitted on 11/19/2018.  The following problems were addressed during his hospitalization:  Syncope  Etiology unclear, highest suspicion for hypovolemia given prodrome and hypernatremia at admission. He was on Amlodipine and BP as well was soft, so could be orthostatic hypotension as well. Also with recurrent event when got up with PT 11/20 (orthostatics unfortunately not checked).  Denied cardiorespiratory symptoms with non-ischemic EKG (trops not trended).  TTE 11/19 with EF 60-65% without WMA or significant valvular disease.  Head CT unremarkable for acute pathology, but does show known old left frontal infarct.  Neuro exam non-focal.  Carotid US 11/21 negative for stenosis.  Tele negative for arrhythmia. Symptoms reported not consistent with seizure. Sustained Lt hip fracture which was treated with ORIF, going to TCU, continue to monitor. Fall risk. His PTA amlodipine was decreased to half at discharge.   Acute hypoxic resp failure due to volume overload and atelectasis.   Hypoxic overnight 11/20-11/21 after initiation of IVF. CXR with pulmonary edema and mild pleural  effusions. Received lasix 40 mg IV x1 with good response.  - oxygen requirements improved, weaned off supplemental O2  - I/O's even to slightly negative.   - Diuretic not continued since weaned off oxygen, he was intravascularly dry when he presented, and given poor oral intake.  - Has temp of 100.2, suspect atelectasis, patient is forgetful, Continue to encourage incentive spirometry at TCU as well after discharge, discussed with nursing staff to remind him frequently.  Left intertrochanteric hip fracture s/p ORIF 11/19  Due to fall. Procedure uncomplicated.    - post-op managed per Ortho  - pain well controlled, and doing well with therapy and discharging to rehab. (TCU)  Acute blood loss anemia  -hgb 12.8 > 11.4 > 10.4>10.3    -Hb stable.  HTN  Placed on Norvasc 5 mg daily 2 weeks ago, but stopped on own due to dizziness.  - Amlodipine resumed at lower dose 2.5 mg daily.  ILIR  Pt declines CPAP at this time, available PRN   CKD IV  History of right radical nephrectomy  Baseline creatinine 2.1-2.2. Creatinine on admission 2.15.   -Cr up slightly 11/21 at 2.38; and is 2.21>> 2.20 which is near his baseline today.  -Light increase in creatinine could also be due to urinary retention.  Now has Valencia catheter.  Urinary retention:  Post op developed urinary retention.  Unable to obtain any symptoms consistent with BPH but likely has it given his age.  -Valencia catheter was placed 11/21, Flomax started  -Voiding trial at TCU, if retention, needs urology referral.  CVA  Left frontal lobe infarction in 5/2018. Head CT on admission showing an old left frontal infarct. No residual deficits.   Peripheral neuropathy   Vitamin B12 deficiency  - Continue PTA gabapentin  Chronic diarrhea  Seen by GI in the outpatient setting. Hold antidiarrheal medication while on narcotics.    Current issues are:      Syncope, unspecified syncope type  Essential hypertension  History of CVA (cerebrovascular accident)  Now at TCU for therapies.  Since admission denies dizziness or syncope. BP Range: 127-142/64-74, HR Range: 72-92 bpm, Wt Range:  180lbs 11/23 - 180.1lbs 11/24. Continues on norvasc 2.5 mg PO daily as well as  mg daily.     Closed nondisplaced intertrochanteric fracture of left femur, sequela  Muscle weakness (generalized)  Reports pain in left leg moderate to severe - comfortable with PRN pain medications, Tylenol and tramadol. WBAT left leg - intact staples, he is to see ortho in 2-3 weeks Dr Wang.     Anemia due to blood loss, acute  Stable on recent check   Lab Results   Component Value Date    HGB 9.8 11/24/2018    HGB 10.3 11/22/2018       ILIR (obstructive sleep apnea)  Does not use CPAP. On o2 at this time 2 lpm per NC and sats 97% - wean as able.     Chronic kidney disease, stage 4 (severe) (H)  History of nephrectomy  Chronic issue with recent Cr stable, will f/u with BMP this week.   Lab Results   Component Value Date    CR 2.20 11/23/2018    CR 2.21 11/22/2018       Peripheral polyneuropathy  Low vitamin B12 level  Asymptomatic today. Managed with Neurontin and cyanocobalamin.     Urinary retention with incomplete bladder emptying  Valencia in place - will attempt voiding later this week, urology f/u if not successful.     Advanced directives, counseling/discussion  Reviewed POLST - patient requests to be DNR.     CODE STATUS/ADVANCE DIRECTIVES DISCUSSION:   DNR   Patient's living condition: lives with spouse    ALLERGIES:Review of patient's allergies indicates no known allergies.  PAST MEDICAL HISTORY:  has a past medical history of Arthritis and Hypertension.  PAST SURGICAL HISTORY:  has a past surgical history that includes orthopedic surgery and Open reduction internal fixation hip nailing (Left, 11/19/2018).  FAMILY HISTORY: family history is not on file.  SOCIAL HISTORY:  reports that he quit smoking about 29 years ago. He has never used smokeless tobacco. He reports that he drinks alcohol. He reports that he does not  "use illicit drugs.    Post Discharge Medication Reconciliation Status: discharge medications reconciled, continue medications without change.  Current Outpatient Prescriptions   Medication Sig Dispense Refill     acetaminophen (TYLENOL) 325 MG tablet Take 2 tablets (650 mg) by mouth every 6 hours as needed for mild pain 40 tablet 0     amLODIPine (NORVASC) 2.5 MG tablet Take 1 tablet (2.5 mg) by mouth daily       aspirin 325 MG EC tablet Take 1 tablet (325 mg) by mouth daily 45 tablet 0     cyanocobalamin (VITAMIN  B-12) 1000 MCG tablet Take 1,000 mcg by mouth daily        gabapentin (NEURONTIN) 300 MG capsule Take 1,500 mg by mouth At Bedtime        loperamide (IMODIUM) 2 MG capsule Take 2 mg by mouth 2 times daily as needed for diarrhea       Multiple Vitamins-Minerals (PRESERVISION AREDS) TABS Take 1 tablet by mouth 2 times daily        senna-docusate (SENOKOT-S;PERICOLACE) 8.6-50 MG per tablet Take 2 tablets by mouth 2 times daily 30 tablet 0     tamsulosin (FLOMAX) 0.4 MG capsule Take 1 capsule (0.4 mg) by mouth daily 60 capsule      traMADol (ULTRAM) 50 MG tablet Take 1 tablet (50 mg) by mouth every 6 hours as needed for moderate pain 30 tablet 0       ROS:  10 point ROS of systems including Constitutional, Eyes, Respiratory, Cardiovascular, Gastroenterology, Genitourinary, Integumentary, Musculoskeletal, Psychiatric were all negative except for pertinent positives noted in my HPI.    Exam:  /71  Pulse 72  Temp 98  F (36.7  C)  Resp 22  Ht 5' 10\" (1.778 m)  Wt 180 lb 1.6 oz (81.7 kg)  SpO2 97%  BMI 25.84 kg/m2  GENERAL APPEARANCE:  Alert, in no distress, pleasant, cooperative, oriented x self, place, recent events  EYES:  EOM, lids, pupils and irises normal, sclera clear and conjunctiva normal, no discharge or mattering on lids or lashes noted  ENT:  Mouth normal, moist mucous membranes, nose normal without drainage or crusting, external ears without lesions, hearing acuity intact  NECK: supple, " symmetrical  RESP:  respiratory effort and palpation of chest normal, no chest wall tenderness, no respiratory distress, Lung sounds diminished at bases, patient is on oxygen per NC  CV:  Palpation and auscultation of heart done, rate and rhythm controlled and regular, no murmur, no rub or gallop. Edema none right leg and trace left ankle/foot.   ABDOMEN:  normal bowel sounds, soft, nontender.  M/S:   Gait and station walks with assist , no tenderness or swelling of the joints; able to move all extremities   SKIN:  Inspection and palpation of skin and subcutaneous tissue: left hip incisions open to air, staples intact, fading bruising and no redness/drainage/warmth  NEURO: cranial nerves 2-12 grossly intact, no facial asymmetry, no speech deficits and able to follow directions, moves all extremities symmetrically  PSYCH:  insight and judgement at baseline, memory appears intact, affect and mood normal     Lab/Diagnostic data:  CBC RESULTS:   Recent Labs   Lab Test  11/24/18   0852  11/22/18   0652   11/19/18   0746   WBC  12.2*   --    --   9.5   RBC  3.10*   --    --   4.14*   HGB  9.8*  10.3*   < >  12.8*   HCT  29.8*   --    --   40.0   MCV  96   --    --   97   MCH  31.6   --    --   30.9   MCHC  32.9   --    --   32.0   RDW  14.4   --    --   14.0   PLT  239  172   --   214    < > = values in this interval not displayed.       Last Basic Metabolic Panel:  Recent Labs   Lab Test  11/23/18   0551  11/22/18   0652  11/21/18   0736   NA   --   143  144   POTASSIUM   --   4.5  4.5   CHLORIDE   --   114*  114*   AGNIESZKA   --   8.0*  7.2*   CO2   --   24  23   BUN   --   44*  44*   CR  2.20*  2.21*  2.38*   GLC   --   115*  121*     ASSESSMENT/PLAN:  Syncope, unspecified syncope type  Essential hypertension  History of CVA (cerebrovascular accident)  Acute on chronic issues. Monitor vs and meds as above. Therapies.     Closed nondisplaced intertrochanteric fracture of left femur, sequela  Muscle weakness  (generalized)  Acute onset with injury, surgery. Pain meds as ordered and f/u ortho 2-3 weeks. Therapies - f/u with progress next week.     Anemia due to blood loss, acute  Acute onset. CBC this week and f/u with results.     ILIR (obstructive sleep apnea)  Chronic, now needing o2 - wean off as able.     Chronic kidney disease, stage 4 (severe) (H)  History of nephrectomy  Chronic, stable last heck. BMP this week. Avoid nephrotoxic meds.     Peripheral polyneuropathy  Chronic, well managed with current meds. Monitor.     Low vitamin B12 level  Chronic, on supplements. No changes.     Urinary retention with incomplete bladder emptying  Acute with surgery, pain meds, decreased mobility. Meds and abdi as above. Voiding trial later this week - urology f/u if unsuccessful.     Advanced directives, counseling/discussion  Reviewed POLST - DNR per patient wishes.     Orders:  1. DNR  2. Incentive Spirometry 10 repetitions every 2 hrs while awake - may self administer after instructions  3. Wean off o2 as able  4. BMP, CBC on 11/29 diagnosis CKD, anemia  5. Remove abdi in AM on 11/29. Check PVR every shift x 2 days. If retains over 300 ml straight cath. If need to cath third time - place Abdi 16 Fr 10 ml balloon and schedule with urology for f/u      Total time spent with patient visit at the skilled nursing facility was 40 min including patient visit and review of past records. Greater than 50% of total time spent with counseling and coordinating care due to review of history, current status, problems and concerns and POC to address them as noted above    Electronically signed by:  JUAN PABLO Cohen CNP                    Sincerely,        JUAN PABLO Cohen CNP

## 2018-11-27 ENCOUNTER — NURSING HOME VISIT (OUTPATIENT)
Dept: GERIATRICS | Facility: CLINIC | Age: 83
End: 2018-11-27
Payer: COMMERCIAL

## 2018-11-27 DIAGNOSIS — N40.0 BENIGN PROSTATIC HYPERPLASIA, UNSPECIFIED WHETHER LOWER URINARY TRACT SYMPTOMS PRESENT: ICD-10-CM

## 2018-11-27 DIAGNOSIS — G62.9 PERIPHERAL POLYNEUROPATHY: ICD-10-CM

## 2018-11-27 DIAGNOSIS — N18.4 CKD (CHRONIC KIDNEY DISEASE) STAGE 4, GFR 15-29 ML/MIN (H): ICD-10-CM

## 2018-11-27 DIAGNOSIS — D62 ANEMIA DUE TO BLOOD LOSS, ACUTE: ICD-10-CM

## 2018-11-27 DIAGNOSIS — I67.9 CEREBRAL VASCULAR DISEASE: ICD-10-CM

## 2018-11-27 DIAGNOSIS — I10 ESSENTIAL HYPERTENSION: ICD-10-CM

## 2018-11-27 DIAGNOSIS — S72.145D CLOSED NONDISPLACED INTERTROCHANTERIC FRACTURE OF LEFT FEMUR WITH ROUTINE HEALING, SUBSEQUENT ENCOUNTER: Primary | ICD-10-CM

## 2018-11-27 DIAGNOSIS — M17.12 PRIMARY OSTEOARTHRITIS OF LEFT KNEE: ICD-10-CM

## 2018-11-27 PROCEDURE — 99306 1ST NF CARE HIGH MDM 50: CPT | Performed by: INTERNAL MEDICINE

## 2018-11-27 NOTE — PROGRESS NOTES
Sourav Fine is a 89 year old male seen 2018 at [facility] where he was admitted last week after a fall at home causing left intertrochanteric hip fracture for which he underwent ORIF 18, and is now here for Rehab.       Pt is seen in his room, up to WC.   Just finished working with PHYSICAL THERAPY, still a lot of pain in left hip, thigh and knee.    Therapist reports he was able to take about 4 steps in the parallel bars.    Reviewed with nursing staff, patient has not had a prn Tylenol since last night.   He had some confusion with opiates, and those have been held.     Prior to his fall pt was ambulatory without device, although some unsteadiness to his gait, by his report.   His son Papo arrives, and confirms some recent changes to his balance.     Fall thought to be related to syncope given a prodrome and hypernatremia, soft bps on amlodipine.   He had an episode of hypoxia and sleepiness , seen in ED.   No significant abnormalities found.       PMH:  HTN  Cerebral vascular disease, h/o left frontal lobe CVI   Chronic midline back pain without sciatica  ILIR  B12 deficiency  SCC forehead, face   OA  Renal CA, s/p right radical nephrectomy, 2006  Prostate CA, 2004      Past Surgical History:   Procedure Laterality Date     OPEN REDUCTION INTERNAL FIXATION HIP NAILING Left 2018    Procedure: OPEN REDUCTION INTERNAL FIXATION LEFT HIP NAILING.;  Surgeon: Mingo Wang MD;  Location:  OR     ORTHOPEDIC SURGERY       Social History   Substance Use Topics     Smoking status: Former Smoker     Quit date: 1989     Smokeless tobacco: Never Used     Alcohol use Yes      Comment: a little wine daily      SH:  Lives with his wife, AL apartment in [place].     They have 4 children, all live out of town.   Papo lives in [place], and the others live out of state.   Patient is a retired .     FH:  Father  at age 80, mother  with  "Parkinson's disease, age 86    Review Of Systems  Skin: negative   Eyes: impaired vision, MD  Ears/Nose/Throat: hearing loss  Respiratory: No shortness of breath, dyspnea on exertion, cough, or hemoptysis  Cardiovascular: negative  Gastrointestinal: poor appetite, +bowel movement this morning  Genitourinary: urinary retention, Valencia recently d/c'd  Musculoskeletal: previously ambulatory without device, now assist with transfers and very limited mobility      +left groin pain     Neurologic: STML  Psychiatric: negative  Hematologic/Lymphatic/Immunologic: negative  Endocrine: negative      GENERAL APPEARANCE: alert and no distress  /69  Pulse 74  Temp 98.3  F (36.8  C)  Resp 18  Ht 5' 10\" (1.778 m)  Wt 176 lb 4.8 oz (80 kg)  SpO2 97%  BMI 25.3 kg/m2   HEENT: normocephalic, no lesion or abnormalities  NECK: no adenopathy, no asymmetry, masses, or scars and thyroid normal to palpation  RESP: decreased BS, few scattered crackles  CV: regular rate and rhythm, normal S1 S2 distant  ABDOMEN:  soft, nontender, no HSM or masses and bowel sounds normal  MS: extremities normal- no gross deformities noted, no evidence of inflammation in joints, trace LE edema  SKIN: no suspicious lesions or rashes, left hip incision with staples intact, no erythema or drainage  NEURO: Normal strength and tone, sensory exam grossly normal, and speech normal  PSYCH: affect okay  LYMPHATICS: No cervical,  or supraclavicular nodes     Last Comprehensive Metabolic Panel:  Sodium   Date Value Ref Range Status   11/22/2018 143 133 - 144 mmol/L Final     Potassium   Date Value Ref Range Status   11/22/2018 4.5 3.4 - 5.3 mmol/L Final     Chloride   Date Value Ref Range Status   11/22/2018 114 (H) 94 - 109 mmol/L Final     Carbon Dioxide   Date Value Ref Range Status   11/22/2018 24 20 - 32 mmol/L Final     Anion Gap   Date Value Ref Range Status   11/22/2018 5 3 - 14 mmol/L Final     Glucose   Date Value Ref Range Status   11/22/2018 115 (H) " 70 - 99 mg/dL Final     Urea Nitrogen   Date Value Ref Range Status   11/22/2018 44 (H) 7 - 30 mg/dL Final     Creatinine   Date Value Ref Range Status   11/23/2018 2.20 (H) 0.66 - 1.25 mg/dL Final     GFR Estimate   Date Value Ref Range Status   11/23/2018 28 (L) >60 mL/min/1.7m2 Final     Comment:     Non  GFR Calc     Calcium   Date Value Ref Range Status   11/22/2018 8.0 (L) 8.5 - 10.1 mg/dL Final     Lab Results   Component Value Date    WBC 12.2 11/24/2018      HGB 9.8 11/24/2018      MCV 96 11/24/2018       11/24/2018       Head CT 11/19/18   IMPRESSION:  No acute intracranial abnormality. 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     ECHO 11/19/18  Interpretation Summary  The visual ejection fraction is estimated at 60-65%.  Normal left ventricular wall motion  The right ventricle is normal in size and function.  There is no pericardial effusion.  The IVC is normal in size and reactivity with respiration, suggesting normal  central venous pressure.  No hemodynamically significant valvular abnormalities on 2D or color flow  imaging. _____________________________________      IMP/PLAN:   (S72.145D) Closed nondisplaced intertrochanteric fracture of left femur with routine healing, subsequent encounter  (primary encounter diagnosis)  Comment: s/p ORIF   Plan: schedule acetaminophen for pain.   Ice, local measures, has prn oxycodone available    PHYSICAL THERAPY / OCCUPATIONAL THERAPY for transfers, balance, ambulation, ADLs.  Discharge goal is return to AL with his wife.    DVT prophylaxis with ASA, per Ortho.     Monitor incision, Orthopedics follow up as scheduled.       (D62) Anemia due to blood loss, acute  Comment: post op   Plan: continue PTA B12 replacement, follow hgb        (M17.12) Primary osteoarthritis of left knee  Comment: longstanding, but acute pain today     Plan: Son and patient requesting injection.  See JNoel note.        (I10) Essential hypertension  Comment: good control  Plan:  continue amlodipine    (N18.4) CKD (chronic kidney disease) stage 4, GFR 15-29 ml/min (H)  Comment: single kidney     Plan: no offending meds noted.   Maintain hydration, follow BMP      (N40.0) Benign prostatic hyperplasia, unspecified whether lower urinary tract symptoms present  Comment: Valencia catheter d/c'd  Plan: continue Flomax, follow for sx    (G62.9) Peripheral polyneuropathy  Comment: on high dose gabapentin at HS  Plan: consider GDR    (I67.9) Cerebral vascular disease  Comment: s/p frontal lobe infarct     Plan: may be some cognitive impairment: formal testing per OCCUPATIONAL THERAPY.           Adia Kelley MD

## 2018-11-27 NOTE — LETTER
11/27/2018        RE: Sourav Fine  [address]  [facility] [address]        Sourav Fine is a 89 year old male seen November 27, 2018 at [facility] where he was admitted last week after a fall at home causing left intertrochanteric hip fracture for which he underwent ORIF 11/19/18, and is now here for Rehab.       Pt is seen in his room, up to WC.   Just finished working with PHYSICAL THERAPY, still a lot of pain in left hip, thigh and knee.    Therapist reports he was able to take about 4 steps in the parallel bars.    Reviewed with nursing staff, patient has not had a prn Tylenol since last night.   He had some confusion with opiates, and those have been held.     Prior to his fall pt was ambulatory without device, although some unsteadiness to his gait, by his report.   His son Papo arrives, and confirms some recent changes to his balance.     Fall thought to be related to syncope given a prodrome and hypernatremia, soft bps on amlodipine.   He had an episode of hypoxia and sleepiness 11/24, seen in ED.   No significant abnormalities found.       PMH:  HTN  Cerebral vascular disease, h/o left frontal lobe CVI 2017  Chronic midline back pain without sciatica  ILIR  B12 deficiency  SCC forehead, face 2013, 17  OA  Renal CA, s/p right radical nephrectomy, 2006  Prostate CA, 2004      Past Surgical History:   Procedure Laterality Date     OPEN REDUCTION INTERNAL FIXATION HIP NAILING Left 11/19/2018    Procedure: OPEN REDUCTION INTERNAL FIXATION LEFT HIP NAILING.;  Surgeon: Mingo Wang MD;  Location:  OR     ORTHOPEDIC SURGERY       Social History   Substance Use Topics     Smoking status: Former Smoker     Quit date: 1/1/1989     Smokeless tobacco: Never Used     Alcohol use Yes      Comment: a little wine daily      SH:  Lives with his wife, AL apartment in [place].     They have 4 children, all live out of town.   Papo lives in [place], and the others live out of state.   Patient is a  "retired .     FH:  Father  at age 80, mother  with Parkinson's disease, age 86    Review Of Systems  Skin: negative   Eyes: impaired vision, MD  Ears/Nose/Throat: hearing loss  Respiratory: No shortness of breath, dyspnea on exertion, cough, or hemoptysis  Cardiovascular: negative  Gastrointestinal: poor appetite, +bowel movement this morning  Genitourinary: urinary retention, Valencia recently d/c'd  Musculoskeletal: previously ambulatory without device, now assist with transfers and very limited mobility      +left groin pain     Neurologic: STML  Psychiatric: negative  Hematologic/Lymphatic/Immunologic: negative  Endocrine: negative      GENERAL APPEARANCE: alert and no distress  /69  Pulse 74  Temp 98.3  F (36.8  C)  Resp 18  Ht 5' 10\" (1.778 m)  Wt 176 lb 4.8 oz (80 kg)  SpO2 97%  BMI 25.3 kg/m2   HEENT: normocephalic, no lesion or abnormalities  NECK: no adenopathy, no asymmetry, masses, or scars and thyroid normal to palpation  RESP: decreased BS, few scattered crackles  CV: regular rate and rhythm, normal S1 S2 distant  ABDOMEN:  soft, nontender, no HSM or masses and bowel sounds normal  MS: extremities normal- no gross deformities noted, no evidence of inflammation in joints, trace LE edema  SKIN: no suspicious lesions or rashes, left hip incision with staples intact, no erythema or drainage  NEURO: Normal strength and tone, sensory exam grossly normal, and speech normal  PSYCH: affect okay  LYMPHATICS: No cervical,  or supraclavicular nodes     Last Comprehensive Metabolic Panel:  Sodium   Date Value Ref Range Status   2018 143 133 - 144 mmol/L Final     Potassium   Date Value Ref Range Status   2018 4.5 3.4 - 5.3 mmol/L Final     Chloride   Date Value Ref Range Status   2018 114 (H) 94 - 109 mmol/L Final     Carbon Dioxide   Date Value Ref Range Status   2018 24 20 - 32 mmol/L Final     Anion Gap   Date Value Ref Range Status   2018 5 3 " - 14 mmol/L Final     Glucose   Date Value Ref Range Status   11/22/2018 115 (H) 70 - 99 mg/dL Final     Urea Nitrogen   Date Value Ref Range Status   11/22/2018 44 (H) 7 - 30 mg/dL Final     Creatinine   Date Value Ref Range Status   11/23/2018 2.20 (H) 0.66 - 1.25 mg/dL Final     GFR Estimate   Date Value Ref Range Status   11/23/2018 28 (L) >60 mL/min/1.7m2 Final     Comment:     Non  GFR Calc     Calcium   Date Value Ref Range Status   11/22/2018 8.0 (L) 8.5 - 10.1 mg/dL Final     Lab Results   Component Value Date    WBC 12.2 11/24/2018      HGB 9.8 11/24/2018      MCV 96 11/24/2018       11/24/2018       Head CT 11/19/18   IMPRESSION:  No acute intracranial abnormality. 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     ECHO 11/19/18  Interpretation Summary  The visual ejection fraction is estimated at 60-65%.  Normal left ventricular wall motion  The right ventricle is normal in size and function.  There is no pericardial effusion.  The IVC is normal in size and reactivity with respiration, suggesting normal  central venous pressure.  No hemodynamically significant valvular abnormalities on 2D or color flow  imaging. _____________________________________      IMP/PLAN:   (S72.145D) Closed nondisplaced intertrochanteric fracture of left femur with routine healing, subsequent encounter  (primary encounter diagnosis)  Comment: s/p ORIF   Plan: schedule acetaminophen for pain.   Ice, local measures, has prn oxycodone available    PHYSICAL THERAPY / OCCUPATIONAL THERAPY for transfers, balance, ambulation, ADLs.  Discharge goal is return to AL with his wife.    DVT prophylaxis with ASA, per Ortho.     Monitor incision, Orthopedics follow up as scheduled.       (D62) Anemia due to blood loss, acute  Comment: post op   Plan: continue PTA B12 replacement, follow hgb        (M17.12) Primary osteoarthritis of left knee  Comment: longstanding, but acute pain today     Plan: Son and patient requesting injection.  See  "JNoel note.        (I10) Essential hypertension  Comment: good control  Plan: continue amlodipine    (N18.4) CKD (chronic kidney disease) stage 4, GFR 15-29 ml/min (H)  Comment: single kidney     Plan: no offending meds noted.   Maintain hydration, follow BMP      (N40.0) Benign prostatic hyperplasia, unspecified whether lower urinary tract symptoms present  Comment: Valencia catheter d/c'd  Plan: continue Flomax, follow for sx    (G62.9) Peripheral polyneuropathy  Comment: on high dose gabapentin at HS  Plan: consider GDR    (I67.9) Cerebral vascular disease  Comment: s/p frontal lobe infarct     Plan: may be some cognitive impairment: formal testing per OCCUPATIONAL THERAPY.           Adia Kelley MD     Ortho Nursing home visit    Sourav Fine is a 89 year old male who resides at [facility]    Patient is seen today for Left knee pain following Left hip fracture with ORIF< now limiting PT, as patient is having pain with Weightbearing.      Past Medical History:   Diagnosis Date     Arthritis      Hypertension       Past Surgical History:   Procedure Laterality Date     OPEN REDUCTION INTERNAL FIXATION HIP NAILING Left 11/19/2018    Procedure: OPEN REDUCTION INTERNAL FIXATION LEFT HIP NAILING.;  Surgeon: Mingo Wang MD;  Location:  OR     ORTHOPEDIC SURGERY          No Known Allergies   /69  Pulse 74  Temp 98.3  F (36.8  C)  Resp 18  Ht 5' 10\" (1.778 m)  Wt 176 lb 4.8 oz (80 kg)  SpO2 97%  BMI 25.3 kg/m2     Exam: Seen today with son in room, allows PROM to left knee, WNL, Lig intact, pain is medial joint line today, Varus deformity, no noted effusion, no swelling,   Able to rise from seated position W/O asst;      X-rays show DJD left knee, with IM nail     ASSESSMENT / PLAN:  Discussed R&B of cortisone injection left knee, After verbal consent, Left knee is prepped, injected with 1 ml depo medrol, 4 ml 1% lidocaine into medial knee joint  Tolerated well, no complaints, plan F/u PRN 3-4 " months    [address]          Terence FERRARA With Adia Kelley MD  [phone]        Sincerely,        Adia Kelley MD

## 2018-11-27 NOTE — PROGRESS NOTES
"Ortho Nursing home visit    Sourav Fine is a 89 year old male who resides at [facility]    Patient is seen today for Left knee pain following Left hip fracture with ORIF< now limiting PT, as patient is having pain with Weightbearing.      Past Medical History:   Diagnosis Date     Arthritis      Hypertension       Past Surgical History:   Procedure Laterality Date     OPEN REDUCTION INTERNAL FIXATION HIP NAILING Left 11/19/2018    Procedure: OPEN REDUCTION INTERNAL FIXATION LEFT HIP NAILING.;  Surgeon: Mingo Wang MD;  Location:  OR     ORTHOPEDIC SURGERY          No Known Allergies   /69  Pulse 74  Temp 98.3  F (36.8  C)  Resp 18  Ht 5' 10\" (1.778 m)  Wt 176 lb 4.8 oz (80 kg)  SpO2 97%  BMI 25.3 kg/m2     Exam: Seen today with son in room, allows PROM to left knee, WNL, Lig intact, pain is medial joint line today, Varus deformity, no noted effusion, no swelling,   Able to rise from seated position W/O asst;      X-rays show DJD left knee, with IM nail     ASSESSMENT / PLAN:  Discussed R&B of cortisone injection left knee, After verbal consent, Left knee is prepped, injected with 1 ml depo medrol, 4 ml 1% lidocaine into medial knee joint  Tolerated well, no complaints, plan F/u PRN 3-4 months    [address]          Terence CAVANAUGH-RADHA With Adia Kelley MD  [phone]    "

## 2018-11-29 ENCOUNTER — TRANSFERRED RECORDS (OUTPATIENT)
Dept: HEALTH INFORMATION MANAGEMENT | Facility: CLINIC | Age: 83
End: 2018-11-29

## 2018-11-29 ENCOUNTER — NURSING HOME VISIT (OUTPATIENT)
Dept: GERIATRICS | Facility: CLINIC | Age: 83
End: 2018-11-29
Payer: COMMERCIAL

## 2018-11-29 ENCOUNTER — HOSPITAL LABORATORY (OUTPATIENT)
Dept: OTHER | Facility: CLINIC | Age: 83
End: 2018-11-29

## 2018-11-29 VITALS
RESPIRATION RATE: 20 BRPM | WEIGHT: 176.3 LBS | BODY MASS INDEX: 25.24 KG/M2 | SYSTOLIC BLOOD PRESSURE: 144 MMHG | TEMPERATURE: 97.7 F | HEIGHT: 70 IN | HEART RATE: 66 BPM | OXYGEN SATURATION: 97 % | DIASTOLIC BLOOD PRESSURE: 78 MMHG

## 2018-11-29 DIAGNOSIS — G47.33 OSA (OBSTRUCTIVE SLEEP APNEA): ICD-10-CM

## 2018-11-29 DIAGNOSIS — R33.9 URINARY RETENTION WITH INCOMPLETE BLADDER EMPTYING: ICD-10-CM

## 2018-11-29 DIAGNOSIS — M62.81 MUSCLE WEAKNESS (GENERALIZED): ICD-10-CM

## 2018-11-29 DIAGNOSIS — I10 ESSENTIAL HYPERTENSION: ICD-10-CM

## 2018-11-29 DIAGNOSIS — R30.0 DYSURIA: ICD-10-CM

## 2018-11-29 DIAGNOSIS — D62 ANEMIA DUE TO BLOOD LOSS, ACUTE: ICD-10-CM

## 2018-11-29 DIAGNOSIS — Z90.5 HISTORY OF NEPHRECTOMY: ICD-10-CM

## 2018-11-29 DIAGNOSIS — N18.4 CHRONIC KIDNEY DISEASE, STAGE 4 (SEVERE) (H): ICD-10-CM

## 2018-11-29 DIAGNOSIS — R55 SYNCOPE, UNSPECIFIED SYNCOPE TYPE: Primary | ICD-10-CM

## 2018-11-29 DIAGNOSIS — S72.145S CLOSED NONDISPLACED INTERTROCHANTERIC FRACTURE OF LEFT FEMUR, SEQUELA: ICD-10-CM

## 2018-11-29 DIAGNOSIS — D72.829 LEUKOCYTOSIS, UNSPECIFIED TYPE: ICD-10-CM

## 2018-11-29 DIAGNOSIS — Z86.73 HISTORY OF CVA (CEREBROVASCULAR ACCIDENT): ICD-10-CM

## 2018-11-29 LAB
ALBUMIN UR-MCNC: 30 MG/DL
ANION GAP SERPL CALCULATED.3IONS-SCNC: 5 MMOL/L (ref 3–14)
APPEARANCE UR: CLEAR
BILIRUB UR QL STRIP: NEGATIVE
BUN SERPL-MCNC: 53 MG/DL (ref 7–30)
CALCIUM SERPL-MCNC: 8.3 MG/DL (ref 8.5–10.1)
CHLORIDE SERPL-SCNC: 112 MMOL/L (ref 94–109)
CO2 SERPL-SCNC: 26 MMOL/L (ref 20–32)
COLOR UR AUTO: ABNORMAL
CREAT SERPL-MCNC: 2.2 MG/DL (ref 0.66–1.25)
ERYTHROCYTE [DISTWIDTH] IN BLOOD BY AUTOMATED COUNT: 14 % (ref 10–15)
GFR SERPL CREATININE-BSD FRML MDRD: 28 ML/MIN/1.7M2
GLUCOSE SERPL-MCNC: 108 MG/DL (ref 70–99)
GLUCOSE UR STRIP-MCNC: NEGATIVE MG/DL
HCT VFR BLD AUTO: 31.6 % (ref 40–53)
HGB BLD-MCNC: 10.3 G/DL (ref 13.3–17.7)
HGB UR QL STRIP: ABNORMAL
INTERPRETATION ECG - MUSE: NORMAL
KETONES UR STRIP-MCNC: NEGATIVE MG/DL
LEUKOCYTE ESTERASE UR QL STRIP: NEGATIVE
MCH RBC QN AUTO: 31.3 PG (ref 26.5–33)
MCHC RBC AUTO-ENTMCNC: 32.6 G/DL (ref 31.5–36.5)
MCV RBC AUTO: 96 FL (ref 78–100)
NITRATE UR QL: NEGATIVE
PH UR STRIP: 5.5 PH (ref 5–7)
PLATELET # BLD AUTO: 383 10E9/L (ref 150–450)
POTASSIUM SERPL-SCNC: 4.3 MMOL/L (ref 3.4–5.3)
RBC # BLD AUTO: 3.29 10E12/L (ref 4.4–5.9)
RBC #/AREA URNS AUTO: 47 /HPF (ref 0–2)
SODIUM SERPL-SCNC: 143 MMOL/L (ref 133–144)
SOURCE: ABNORMAL
SP GR UR STRIP: 1.01 (ref 1–1.03)
UROBILINOGEN UR STRIP-MCNC: NORMAL MG/DL (ref 0–2)
WBC # BLD AUTO: 14 10E9/L (ref 4–11)
WBC #/AREA URNS AUTO: 6 /HPF (ref 0–5)

## 2018-11-29 PROCEDURE — 99310 SBSQ NF CARE HIGH MDM 45: CPT | Performed by: NURSE PRACTITIONER

## 2018-11-29 NOTE — PROGRESS NOTES
[facility]    Chief Complaint   Patient presents with     RECHECK       [facility] Record Number:  [id number]  Place of Service where encounter took place:  [facility] [[id number]]    HPI:    Sourav Fine is a 89 year old  (11/15/1925), who is being seen today for an episodic care visit.  HPI information obtained from: facility chart records, facility staff, patient report and [facility] chart review.     Patient with syncope episode (suspected due to hypovolemia) which led to a fall and left hip fracture s/p ORIF 11/19. Now at TCU for rehab. Had anemia with Hgb down to 10.3 at hospital. Also has a hx of HTN, ILIR (has a CPAP machine at home but not at TCU), CKD stage 4 with previous right nephrectomy and urinary retention (admitted with catheter, now removed). Hx  Of CVA no residual at this time.     Current issues are:      Urinary retention with incomplete bladder emptying  Dysuria  Leukocytosis, unspecified type  Patient now has had his abdi removed but unable to empty today - had PVR of 400 ml, straight catheterized for 100 ml. Patient now with bladder pain. Dysuria, retention. Note WBC elevated today as well.   Lab Results   Component Value Date    WBC 14.0 11/29/2018    WBC 12.2 11/24/2018       Syncope, unspecified syncope type  Essential hypertension  History of CVA (cerebrovascular accident)  Patient without any further symptoms or occurrences of syncope. Recent BP: 102-161/54-78, HR: 66-92, Wt: 180lbs 11/23 -176.3lbs 11/26 and BP meds are unchanged.     Closed nondisplaced intertrochanteric fracture of left femur, sequela  Muscle weakness (generalized)  Up with therapies. Family report very confused with tramadol use, ask to discontinue it. He is on scheduled tylenol - pain not controlled. Wife in room - talked about low dose PRN oxycodone, she is OK with having this available for comfort.     Anemia due to blood loss, acute  Note improved hgb last check    Lab Results   Component Value Date    HGB 10.3 11/29/2018    HGB 9.8 11/24/2018       ILIR (obstructive sleep apnea)  CPAP at home - may bring in and use per home routine.     Chronic kidney disease, stage 4 (severe) (H)  History of nephrectomy  Note stable renal function today   Lab Results   Component Value Date    CR 2.20 11/29/2018    CR 2.20 11/23/2018     Lab Results   Component Value Date    POTASSIUM 4.3 11/29/2018    POTASSIUM 4.5 11/22/2018       ALLERGIES: Review of patient's allergies indicates no known allergies.  Past Medical, Surgical, Family and Social History reviewed and updated in [facility].    Current Outpatient Prescriptions   Medication Sig Dispense Refill     ACETAMINOPHEN PO Take 1,000 mg by mouth every 8 hours For 1 week       amLODIPine (NORVASC) 2.5 MG tablet Take 1 tablet (2.5 mg) by mouth daily       aspirin 325 MG EC tablet Take 1 tablet (325 mg) by mouth daily 45 tablet 0     cyanocobalamin (VITAMIN  B-12) 1000 MCG tablet Take 1,000 mcg by mouth daily        gabapentin (NEURONTIN) 300 MG capsule Take 1,500 mg by mouth At Bedtime        loperamide (IMODIUM) 2 MG capsule Take 2 mg by mouth 2 times daily as needed for diarrhea       Multiple Vitamins-Minerals (PRESERVISION AREDS) TABS Take 1 tablet by mouth 2 times daily        OXYCODONE HCL PO Take 2.5 mg by mouth every 8 hours as needed       senna-docusate (SENOKOT-S;PERICOLACE) 8.6-50 MG per tablet Take 2 tablets by mouth 2 times daily 30 tablet 0     tamsulosin (FLOMAX) 0.4 MG capsule Take 1 capsule (0.4 mg) by mouth daily 60 capsule      Medications reviewed:  Medications reconciled to facility chart and changes were made to reflect current medications as identified as above med list. Below are the changes that were made:   Medications stopped since last EPIC medication reconciliation:   Medications Discontinued During This Encounter   Medication Reason     acetaminophen (TYLENOL) 325 MG tablet      traMADol (ULTRAM) 50 MG tablet   "      Medications started since last [facility] medication reconciliation:  Orders Placed This Encounter   Medications     ACETAMINOPHEN PO     Sig: Take 1,000 mg by mouth every 8 hours For 1 week     OXYCODONE HCL PO     Sig: Take 2.5 mg by mouth every 8 hours as needed       REVIEW OF SYSTEMS:  10 point ROS of systems including Constitutional, Eyes, Respiratory, Cardiovascular, Gastroenterology, Genitourinary, Integumentary, Musculoskeletal, Psychiatric were all negative except for pertinent positives noted in my HPI.    Physical Exam:  /78  Pulse 66  Temp 97.7  F (36.5  C)  Resp 20  Ht 5' 10\" (1.778 m)  Wt 176 lb 4.8 oz (80 kg)  SpO2 97%  BMI 25.3 kg/m2  GENERAL APPEARANCE:  Alert, in no distress, pleasant, cooperative, oriented x self, place, recent events and wife  EYES:  EOM, lids, pupils and irises normal, sclera clear and conjunctiva normal, no discharge or mattering on lids or lashes noted  ENT:  Mouth normal, moist mucous membranes, nose normal without drainage or crusting, external ears without lesions, hearing acuity intact  RESP:  respiratory effort and palpation of chest normal, no chest wall tenderness, no respiratory distress, Lung sounds diminished at bases, patient is room air and 97%  CV:  Palpation and auscultation of heart done, rate and rhythm controlled and regular, no murmur, no rub or gallop. Edema none right leg and trace left ankle/foot.   ABDOMEN:  normal bowel sounds, soft, nontender.  M/S:   Gait and station walks with assist, no tenderness or swelling of the joints; able to move all extremities   NEURO: cranial nerves 2-12 grossly intact, no facial asymmetry, no speech deficits and able to follow directions, moves all extremities symmetrically  PSYCH:  insight and judgement at baseline, memory appears intact, affect and mood normal     Recent Labs:  CBC RESULTS:   Recent Labs   Lab Test  11/29/18   0840  11/24/18   0852   WBC  14.0*  12.2*   RBC  3.29*  3.10*   HGB  10.3*  9.8* "   HCT  31.6*  29.8*   MCV  96  96   MCH  31.3  31.6   MCHC  32.6  32.9   RDW  14.0  14.4   PLT  383  239       Last Basic Metabolic Panel:  Recent Labs   Lab Test  11/29/18   0840  11/23/18   0551  11/22/18   0652   NA  143   --   143   POTASSIUM  4.3   --   4.5   CHLORIDE  112*   --   114*   AGNIESZKA  8.3*   --   8.0*   CO2  26   --   24   BUN  53*   --   44*   CR  2.20*  2.20*  2.21*   GLC  108*   --   115*         Assessment/Plan:  Urinary retention with incomplete bladder emptying  Dysuria  Leukocytosis, unspecified type  Acute issue since catheter placed/removed. Continue voiding trial as ordered, send UA/UC and start Rocephin while waiting for results. CBC tomorrow for follow up.     Syncope, unspecified syncope type  Essential hypertension  History of CVA (cerebrovascular accident)  Acute on chronic. Monitor VS and continue meds as ordered. F/U with status next week.     Closed nondisplaced intertrochanteric fracture of left femur, sequela  Muscle weakness (generalized)  Acute on chronic. Therapies as ordered. F/U with ortho as planned. Add PRN oxycodone low dose and stop tramadol per family request    Anemia due to blood loss, acute  Acute with surgery. Improved hgb last check. F/U CBC one week.     ILIR (obstructive sleep apnea)  Chronic, cpap per home routine.     Chronic kidney disease, stage 4 (severe) (H)  History of nephrectomy  Chronic, stable. BMP next week. Avoid nephrotoxic meds.     Orders:  1. UA/UC diagnosis dysuria  2. discontinue tramadol  3. Rocephin 1 gm IM daily x 3 days - OK to give with tramadol. Diagnosis urinary symptoms, leukocytosis  4. CBC with diff on 11/30 diagnosis leukocytosis  5. CBC, BMP in 1 week diagnosis anemia, leukocytosis  6. Oxycodone 2.5 mg PO every 8 hrs PRN pain    Total time spent with patient visit was 35 min including patient visit, review of past records and conversation with wife and staff. Greater than 50% of total time spent with counseling and coordinating care due  to review of concerns, current status, conversation with family about status and POC to address issues.      Electronically signed by  JUAN PABLO Cohen CNP

## 2018-11-29 NOTE — LETTER
11/29/2018        RE: Sourav Fine  [address]  [place] [address]        [facility]    Chief Complaint   Patient presents with     RECHECK       [facility] Record Number:  [id number]  Place of Service where encounter took place:  [facility] [[id number]]    HPI:    Sourav Fine is a 89 year old  (11/15/1925), who is being seen today for an episodic care visit.  HPI information obtained from: facility chart records, facility staff, patient report and [facility] chart review.     Patient with syncope episode (suspected due to hypovolemia) which led to a fall and left hip fracture s/p ORIF 11/19. Now at TCU for rehab. Had anemia with Hgb down to 10.3 at hospital. Also has a hx of HTN, ILIR (has a CPAP machine at home but not at TCU), CKD stage 4 with previous right nephrectomy and urinary retention (admitted with catheter, now removed). Hx  Of CVA no residual at this time.     Current issues are:      Urinary retention with incomplete bladder emptying  Dysuria  Leukocytosis, unspecified type  Patient now has had his abdi removed but unable to empty today - had PVR of 400 ml, straight catheterized for 100 ml. Patient now with bladder pain. Dysuria, retention. Note WBC elevated today as well.   Lab Results   Component Value Date    WBC 14.0 11/29/2018    WBC 12.2 11/24/2018       Syncope, unspecified syncope type  Essential hypertension  History of CVA (cerebrovascular accident)  Patient without any further symptoms or occurrences of syncope. Recent BP: 102-161/54-78, HR: 66-92, Wt: 180lbs 11/23 -176.3lbs 11/26 and BP meds are unchanged.     Closed nondisplaced intertrochanteric fracture of left femur, sequela  Muscle weakness (generalized)  Up with therapies. Family report very confused with tramadol use, ask to discontinue it. He is on scheduled tylenol - pain not controlled. Wife in room - talked about low dose PRN oxycodone, she is OK with having this  available for comfort.     Anemia due to blood loss, acute  Note improved hgb last check   Lab Results   Component Value Date    HGB 10.3 11/29/2018    HGB 9.8 11/24/2018       ILIR (obstructive sleep apnea)  CPAP at home - may bring in and use per home routine.     Chronic kidney disease, stage 4 (severe) (H)  History of nephrectomy  Note stable renal function today   Lab Results   Component Value Date    CR 2.20 11/29/2018    CR 2.20 11/23/2018     Lab Results   Component Value Date    POTASSIUM 4.3 11/29/2018    POTASSIUM 4.5 11/22/2018       ALLERGIES: Review of patient's allergies indicates no known allergies.  Past Medical, Surgical, Family and Social History reviewed and [facility] in [facility].    Current Outpatient Prescriptions   Medication Sig Dispense Refill     ACETAMINOPHEN PO Take 1,000 mg by mouth every 8 hours For 1 week       amLODIPine (NORVASC) 2.5 MG tablet Take 1 tablet (2.5 mg) by mouth daily       aspirin 325 MG EC tablet Take 1 tablet (325 mg) by mouth daily 45 tablet 0     cyanocobalamin (VITAMIN  B-12) 1000 MCG tablet Take 1,000 mcg by mouth daily        gabapentin (NEURONTIN) 300 MG capsule Take 1,500 mg by mouth At Bedtime        loperamide (IMODIUM) 2 MG capsule Take 2 mg by mouth 2 times daily as needed for diarrhea       Multiple Vitamins-Minerals (PRESERVISION AREDS) TABS Take 1 tablet by mouth 2 times daily        OXYCODONE HCL PO Take 2.5 mg by mouth every 8 hours as needed       senna-docusate (SENOKOT-S;PERICOLACE) 8.6-50 MG per tablet Take 2 tablets by mouth 2 times daily 30 tablet 0     tamsulosin (FLOMAX) 0.4 MG capsule Take 1 capsule (0.4 mg) by mouth daily 60 capsule      Medications reviewed:  Medications reconciled to facility chart and changes were made to reflect current medications as identified as above med list. Below are the changes that were made:   Medications stopped since last EPIC medication reconciliation:   Medications Discontinued During This Encounter   Medication  "Reason     acetaminophen (TYLENOL) 325 MG tablet      traMADol (ULTRAM) 50 MG tablet        Medications started since last EPIC medication reconciliation:  Orders Placed This Encounter   Medications     ACETAMINOPHEN PO     Sig: Take 1,000 mg by mouth every 8 hours For 1 week     OXYCODONE HCL PO     Sig: Take 2.5 mg by mouth every 8 hours as needed       REVIEW OF SYSTEMS:  10 point ROS of systems including Constitutional, Eyes, Respiratory, Cardiovascular, Gastroenterology, Genitourinary, Integumentary, Musculoskeletal, Psychiatric were all negative except for pertinent positives noted in my HPI.    Physical Exam:  /78  Pulse 66  Temp 97.7  F (36.5  C)  Resp 20  Ht 5' 10\" (1.778 m)  Wt 176 lb 4.8 oz (80 kg)  SpO2 97%  BMI 25.3 kg/m2  GENERAL APPEARANCE:  Alert, in no distress, pleasant, cooperative, oriented x self, place, recent events and wife  EYES:  EOM, lids, pupils and irises normal, sclera clear and conjunctiva normal, no discharge or mattering on lids or lashes noted  ENT:  Mouth normal, moist mucous membranes, nose normal without drainage or crusting, external ears without lesions, hearing acuity intact  RESP:  respiratory effort and palpation of chest normal, no chest wall tenderness, no respiratory distress, Lung sounds diminished at bases, patient is room air and 97%  CV:  Palpation and auscultation of heart done, rate and rhythm controlled and regular, no murmur, no rub or gallop. Edema none right leg and trace left ankle/foot.   ABDOMEN:  normal bowel sounds, soft, nontender.  M/S:   Gait and station walks with assist, no tenderness or swelling of the joints; able to move all extremities   NEURO: cranial nerves 2-12 grossly intact, no facial asymmetry, no speech deficits and able to follow directions, moves all extremities symmetrically  PSYCH:  insight and judgement at baseline, memory appears intact, affect and mood normal     Recent Labs:  CBC RESULTS:   Recent Labs   Lab Test  " 11/29/18   0840  11/24/18   0852   WBC  14.0*  12.2*   RBC  3.29*  3.10*   HGB  10.3*  9.8*   HCT  31.6*  29.8*   MCV  96  96   MCH  31.3  31.6   MCHC  32.6  32.9   RDW  14.0  14.4   PLT  383  239       Last Basic Metabolic Panel:  Recent Labs   Lab Test  11/29/18   0840  11/23/18   0551  11/22/18   0652   NA  143   --   143   POTASSIUM  4.3   --   4.5   CHLORIDE  112*   --   114*   AGNIESZKA  8.3*   --   8.0*   CO2  26   --   24   BUN  53*   --   44*   CR  2.20*  2.20*  2.21*   GLC  108*   --   115*         Assessment/Plan:  Urinary retention with incomplete bladder emptying  Dysuria  Leukocytosis, unspecified type  Acute issue since catheter placed/removed. Continue voiding trial as ordered, send UA/UC and start Rocephin while waiting for results. CBC tomorrow for follow up.     Syncope, unspecified syncope type  Essential hypertension  History of CVA (cerebrovascular accident)  Acute on chronic. Monitor VS and continue meds as ordered. F/U with status next week.     Closed nondisplaced intertrochanteric fracture of left femur, sequela  Muscle weakness (generalized)  Acute on chronic. Therapies as ordered. F/U with ortho as planned. Add PRN oxycodone low dose and stop tramadol per family request    Anemia due to blood loss, acute  Acute with surgery. Improved hgb last check. F/U CBC one week.     ILIR (obstructive sleep apnea)  Chronic, cpap per home routine.     Chronic kidney disease, stage 4 (severe) (H)  History of nephrectomy  Chronic, stable. BMP next week. Avoid nephrotoxic meds.     Orders:  1. UA/UC diagnosis dysuria  2. discontinue tramadol  3. Rocephin 1 gm IM daily x 3 days - OK to give with tramadol. Diagnosis urinary symptoms, leukocytosis  4. CBC with diff on 11/30 diagnosis leukocytosis  5. CBC, BMP in 1 week diagnosis anemia, leukocytosis  6. Oxycodone 2.5 mg PO every 8 hrs PRN pain    Total time spent with patient visit was 35 min including patient visit, review of past records and conversation with  wife and staff. Greater than 50% of total time spent with counseling and coordinating care due to review of concerns, current status, conversation with family about status and POC to address issues.      Electronically signed by  JUAN PABLO Cohen CNP                      Sincerely,        JUAN PABLO Cohen CNP

## 2018-11-30 ENCOUNTER — HOSPITAL LABORATORY (OUTPATIENT)
Dept: OTHER | Facility: CLINIC | Age: 83
End: 2018-11-30

## 2018-11-30 LAB
BACTERIA SPEC CULT: NO GROWTH
BASOPHILS # BLD AUTO: 0 10E9/L (ref 0–0.2)
BASOPHILS NFR BLD AUTO: 0.3 %
DIFFERENTIAL METHOD BLD: ABNORMAL
EOSINOPHIL # BLD AUTO: 0.5 10E9/L (ref 0–0.7)
EOSINOPHIL NFR BLD AUTO: 3.9 %
ERYTHROCYTE [DISTWIDTH] IN BLOOD BY AUTOMATED COUNT: 13.8 % (ref 10–15)
HCT VFR BLD AUTO: 33.3 % (ref 40–53)
HGB BLD-MCNC: 10.7 G/DL (ref 13.3–17.7)
IMM GRANULOCYTES # BLD: 0.1 10E9/L (ref 0–0.4)
IMM GRANULOCYTES NFR BLD: 0.9 %
LYMPHOCYTES # BLD AUTO: 2.2 10E9/L (ref 0.8–5.3)
LYMPHOCYTES NFR BLD AUTO: 15.8 %
Lab: NORMAL
MCH RBC QN AUTO: 30.8 PG (ref 26.5–33)
MCHC RBC AUTO-ENTMCNC: 32.1 G/DL (ref 31.5–36.5)
MCV RBC AUTO: 96 FL (ref 78–100)
MONOCYTES # BLD AUTO: 1.3 10E9/L (ref 0–1.3)
MONOCYTES NFR BLD AUTO: 9.6 %
NEUTROPHILS # BLD AUTO: 9.5 10E9/L (ref 1.6–8.3)
NEUTROPHILS NFR BLD AUTO: 69.5 %
NRBC # BLD AUTO: 0 10*3/UL
NRBC BLD AUTO-RTO: 0 /100
PLATELET # BLD AUTO: 438 10E9/L (ref 150–450)
RBC # BLD AUTO: 3.47 10E12/L (ref 4.4–5.9)
SPECIMEN SOURCE: NORMAL
WBC # BLD AUTO: 13.7 10E9/L (ref 4–11)

## 2018-12-03 ENCOUNTER — NURSING HOME VISIT (OUTPATIENT)
Dept: GERIATRICS | Facility: CLINIC | Age: 83
End: 2018-12-03
Payer: COMMERCIAL

## 2018-12-03 VITALS
HEART RATE: 64 BPM | SYSTOLIC BLOOD PRESSURE: 146 MMHG | RESPIRATION RATE: 16 BRPM | DIASTOLIC BLOOD PRESSURE: 78 MMHG | OXYGEN SATURATION: 97 % | TEMPERATURE: 97.1 F

## 2018-12-03 DIAGNOSIS — R30.0 DYSURIA: ICD-10-CM

## 2018-12-03 DIAGNOSIS — N18.4 CHRONIC KIDNEY DISEASE, STAGE 4 (SEVERE) (H): ICD-10-CM

## 2018-12-03 DIAGNOSIS — I10 ESSENTIAL HYPERTENSION: ICD-10-CM

## 2018-12-03 DIAGNOSIS — D62 ANEMIA DUE TO BLOOD LOSS, ACUTE: ICD-10-CM

## 2018-12-03 DIAGNOSIS — D72.829 LEUKOCYTOSIS, UNSPECIFIED TYPE: ICD-10-CM

## 2018-12-03 DIAGNOSIS — Z90.5 HISTORY OF NEPHRECTOMY: ICD-10-CM

## 2018-12-03 DIAGNOSIS — M62.81 MUSCLE WEAKNESS (GENERALIZED): ICD-10-CM

## 2018-12-03 DIAGNOSIS — R33.9 URINARY RETENTION WITH INCOMPLETE BLADDER EMPTYING: Primary | ICD-10-CM

## 2018-12-03 DIAGNOSIS — R55 SYNCOPE, UNSPECIFIED SYNCOPE TYPE: ICD-10-CM

## 2018-12-03 DIAGNOSIS — S72.145S CLOSED NONDISPLACED INTERTROCHANTERIC FRACTURE OF LEFT FEMUR, SEQUELA: ICD-10-CM

## 2018-12-03 DIAGNOSIS — Z86.73 HISTORY OF CVA (CEREBROVASCULAR ACCIDENT): ICD-10-CM

## 2018-12-03 DIAGNOSIS — R19.5 LOOSE STOOLS: ICD-10-CM

## 2018-12-03 PROCEDURE — 99310 SBSQ NF CARE HIGH MDM 45: CPT | Performed by: NURSE PRACTITIONER

## 2018-12-03 NOTE — PROGRESS NOTES
[facility]    Chief Complaint   Patient presents with     RECHECK       [facility] Record Number:  [id number]  Place of Service where encounter took place:  [facility] [[id number]]    HPI:    Sourav Fine is a 89 year old  (11/15/1925), who is being seen today for an episodic care visit.  HPI information obtained from: facility chart records, facility staff, patient report and [facility] chart review.     Patient with syncope episode (suspected due to hypovolemia) which led to a fall and left hip fracture s/p ORIF 11/19. Now at TCU for rehab. Had anemia with Hgb down to 10.3 at hospital. Also has a hx of HTN, ILIR (has a CPAP machine at home but not at TCU), CKD stage 4 with previous right nephrectomy and urinary retention (admitted with catheter, now removed then replaced). Hx  Of CVA no residual at this time.      Current issues are:      Urinary retention with incomplete bladder emptying  Dysuria  Leukocytosis, unspecified type  Patient failed voiding trial - [place] back in place, needs urology consults. No fevers, UA/UC negative. Improved WBC on last check after three days of Rocephin. Feels a bit stronger today - no bladder discomfort. Will f/u with CBC on 12/6. Patient continues Flomax as ordered.   Lab Results   Component Value Date    WBC 13.7 11/30/2018    WBC 14.0 11/29/2018        Syncope, unspecified syncope type  Essential hypertension  History of CVA (cerebrovascular accident)  Patient without any further symptoms or reoccurrences of syncope. Recent BP: 102-167/54-83, HR: 63-92, Wt: 180lbs 11/23 - 176.3lbs 11/26. Meds unchanged.     Closed nondisplaced intertrochanteric fracture of left femur, sequela  Muscle weakness (generalized)  Up with therapies and continues on scheduled tylenol - pain well controlled. Made low dose PRN oxycodone available - used twice in the past three days.     Anemia due to blood loss, acute  Note improved Hgb last check   Lab  Results   Component Value Date    HGB 10.7 11/30/2018    HGB 10.3 11/29/2018        Chronic kidney disease, stage 4 (severe) (H)  History of nephrectomy  Note stable renal function today   Lab Results   Component Value Date    CR 2.20 11/29/2018    CR 2.20 11/23/2018     Loose stools  New complain, has scheduled senna s. Reports at home managed with twice daily PRN loperamide as well as scheduled Colestipol 1 gm BID. At this time will stop laxatives, if loose stools continue - resume Colestipol per home routine.     ALLERGIES: Review of patient's allergies indicates no known allergies.  Past Medical, Surgical, Family and Social History reviewed and updated in [facility].    Current Outpatient Prescriptions   Medication Sig Dispense Refill     ACETAMINOPHEN PO Take 1,000 mg by mouth every 8 hours For 1 week       amLODIPine (NORVASC) 2.5 MG tablet Take 1 tablet (2.5 mg) by mouth daily       aspirin 325 MG EC tablet Take 1 tablet (325 mg) by mouth daily 45 tablet 0     cyanocobalamin (VITAMIN  B-12) 1000 MCG tablet Take 1,000 mcg by mouth daily        gabapentin (NEURONTIN) 300 MG capsule Take 1,500 mg by mouth At Bedtime        loperamide (IMODIUM) 2 MG capsule Take 2 mg by mouth 2 times daily as needed for diarrhea       Multiple Vitamins-Minerals (PRESERVISION AREDS) TABS Take 1 tablet by mouth 2 times daily        OXYCODONE HCL PO Take 2.5 mg by mouth every 8 hours as needed       senna-docusate (SENOKOT-S;PERICOLACE) 8.6-50 MG per tablet Take 2 tablets by mouth 2 times daily (Patient taking differently: Take 2 tablets by mouth 2 times daily as needed ) 30 tablet 0     tamsulosin (FLOMAX) 0.4 MG capsule Take 1 capsule (0.4 mg) by mouth daily 60 capsule      Medications reviewed:  Medications reconciled to facility chart and changes were made to reflect current medications as identified as above med list. Below are the changes that were made:   Medications stopped since last EPIC medication reconciliation:   There are no  discontinued medications.    Medications started since last [facility] medication reconciliation:  No orders of the defined types were placed in this encounter.      REVIEW OF SYSTEMS:  10 point ROS of systems including Constitutional, Eyes, Respiratory, Cardiovascular, Gastroenterology, Genitourinary, Integumentary, Musculoskeletal, Psychiatric were all negative except for pertinent positives noted in my HPI.    Physical Exam:  /78  Pulse 64  Temp 97.1  F (36.2  C)  Resp 16  SpO2 97%  GENERAL APPEARANCE:  Alert, in no distress, pleasant, cooperative, oriented x self, place, recent events and wife/daughter  EYES:  EOM, lids, pupils and irises normal, sclera clear and conjunctiva normal, no discharge or mattering on lids or lashes noted  ENT:  Mouth normal, moist mucous membranes, nose normal without drainage or crusting, external ears without lesions, hearing acuity intact  RESP:  respiratory effort and palpation of chest normal, no chest wall tenderness, no respiratory distress, Lung sounds diminished at bases with faint crackles left base, patient is room air and 97%  CV:  Palpation and auscultation of heart done, rate and rhythm controlled and regular, no murmur, no rub or gallop. Edema +2 left lower leg and +1 right lower leg.   ABDOMEN:  normal bowel sounds, soft, nontender.  M/S:   Gait and station walks with assist, no tenderness or swelling of the joints; able to move all extremities   NEURO: cranial nerves 2-12 grossly intact, no facial asymmetry, no speech deficits and able to follow directions, moves all extremities symmetrically  PSYCH:  insight and judgement at baseline, memory appears impaired, affect and mood normal     Recent Labs:  CBC RESULTS:   Recent Labs   Lab Test  11/30/18   0700  11/29/18   0840   WBC  13.7*  14.0*   RBC  3.47*  3.29*   HGB  10.7*  10.3*   HCT  33.3*  31.6*   MCV  96  96   MCH  30.8  31.3   MCHC  32.1  32.6   RDW  13.8  14.0   PLT  438  383       Last Basic Metabolic  Panel:  Recent Labs   Lab Test  11/29/18   0840  11/23/18   0551  11/22/18   0652   NA  143   --   143   POTASSIUM  4.3   --   4.5   CHLORIDE  112*   --   114*   AGNIESZKA  8.3*   --   8.0*   CO2  26   --   24   BUN  53*   --   44*   CR  2.20*  2.20*  2.21*   GLC  108*   --   115*         Assessment/Plan:  Urinary retention with incomplete bladder emptying  Dysuria  Leukocytosis, unspecified type  Acute on chronic. WBC improved, no further bladder pain or fevers. Failed voiding trial - urology consult. CBC on 12/6    Syncope, unspecified syncope type  Essential hypertension  History of CVA (cerebrovascular accident)  Acute on chronic. Monitor VS and continue meds as ordered. F/U with status next week.     Closed nondisplaced intertrochanteric fracture of left femur, sequela  Muscle weakness (generalized)  Acute on chronic. Therapies as ordered. F/U with ortho as planned. Meds as above. Staff to update provider if not effective.     Anemia due to blood loss, acute  Acute with surgery. Improved Hgb last check. F/U CBC 12/6    Chronic kidney disease, stage 4 (severe) (H)  History of nephrectomy  Chronic, stable. BMP 12/6. Avoid nephrotoxic meds.     Loose stools  Acute on chronic. Stop senna s. Monitor - if still loose will restart Colestipol 1 gm PO BID as PTA for diarrhea management.     Orders:  1. Change Senna S to 2 tabs PO BID PRN constipation.   2. If still having loose stools on 12/6 - staff to update NP  3. Schedule urology consult due to urinary retention, failed voiding trial at TCU  4. TEDs knee high on in AM off at HS diagnosis edema    Total time spent with patient visit was 35 min including patient visit, review of past records and conversation with wife/daughter and staff. Greater than 50% of total time spent with counseling and coordinating care due to review of concerns, current status, conversation with family about status and POC to address issues.      Electronically signed by  JUAN PABLO Cohen  CNP

## 2018-12-03 NOTE — LETTER
12/3/2018        RE: Sourav Fine  [address]  [place] [address]        [facility]    Chief Complaint   Patient presents with     RECHECK       [place] Medical Record Number:  [id number]  Place of Service where encounter took place:  [facility] [[id number]]    HPI:    Sourav Fine is a 89 year old  (11/15/1925), who is being seen today for an episodic care visit.  HPI information obtained from: facility chart records, facility staff, patient report and [facility] chart review.     Patient with syncope episode (suspected due to hypovolemia) which led to a fall and left hip fracture s/p ORIF 11/19. Now at TCU for rehab. Had anemia with Hgb down to 10.3 at hospital. Also has a hx of HTN, ILIR (has a CPAP machine at home but not at TCU), CKD stage 4 with previous right nephrectomy and urinary retention (admitted with catheter, now removed then replaced). Hx  Of CVA no residual at this time.      Current issues are:      Urinary retention with incomplete bladder emptying  Dysuria  Leukocytosis, unspecified type  Patient failed voiding trial - [place] back in place, needs urology consults. No fevers, UA/UC negative. Improved WBC on last check after three days of Rocephin. Feels a bit stronger today - no bladder discomfort. Will f/u with CBC on 12/6. Patient continues Flomax as ordered.   Lab Results   Component Value Date    WBC 13.7 11/30/2018    WBC 14.0 11/29/2018        Syncope, unspecified syncope type  Essential hypertension  History of CVA (cerebrovascular accident)  Patient without any further symptoms or reoccurrences of syncope. Recent BP: 102-167/54-83, HR: 63-92, Wt: 180lbs 11/23 - 176.3lbs 11/26. Meds unchanged.     Closed nondisplaced intertrochanteric fracture of left femur, sequela  Muscle weakness (generalized)  Up with therapies and continues on scheduled tylenol - pain well controlled. Made low dose PRN oxycodone available - used twice in the past  three days.     Anemia due to blood loss, acute  Note improved Hgb last check   Lab Results   Component Value Date    HGB 10.7 11/30/2018    HGB 10.3 11/29/2018        Chronic kidney disease, stage 4 (severe) (H)  History of nephrectomy  Note stable renal function today   Lab Results   Component Value Date    CR 2.20 11/29/2018    CR 2.20 11/23/2018     Loose stools  New complain, has scheduled senna s. Reports at home managed with twice daily PRN loperamide as well as scheduled Colestipol 1 gm BID. At this time will stop laxatives, if loose stools continue - resume Colestipol per home routine.     ALLERGIES: Review of patient's allergies indicates no known allergies.  Past Medical, Surgical, Family and Social History reviewed and updated in [facility].    Current Outpatient Prescriptions   Medication Sig Dispense Refill     ACETAMINOPHEN PO Take 1,000 mg by mouth every 8 hours For 1 week       amLODIPine (NORVASC) 2.5 MG tablet Take 1 tablet (2.5 mg) by mouth daily       aspirin 325 MG EC tablet Take 1 tablet (325 mg) by mouth daily 45 tablet 0     cyanocobalamin (VITAMIN  B-12) 1000 MCG tablet Take 1,000 mcg by mouth daily        gabapentin (NEURONTIN) 300 MG capsule Take 1,500 mg by mouth At Bedtime        loperamide (IMODIUM) 2 MG capsule Take 2 mg by mouth 2 times daily as needed for diarrhea       Multiple Vitamins-Minerals (PRESERVISION AREDS) TABS Take 1 tablet by mouth 2 times daily        OXYCODONE HCL PO Take 2.5 mg by mouth every 8 hours as needed       senna-docusate (SENOKOT-S;PERICOLACE) 8.6-50 MG per tablet Take 2 tablets by mouth 2 times daily (Patient taking differently: Take 2 tablets by mouth 2 times daily as needed ) 30 tablet 0     tamsulosin (FLOMAX) 0.4 MG capsule Take 1 capsule (0.4 mg) by mouth daily 60 capsule      Medications reviewed:  Medications reconciled to facility chart and changes were made to reflect current medications as identified as above med list. Below are the changes that were  made:   Medications stopped since last EPIC medication reconciliation:   There are no discontinued medications.    Medications started since last [facility] medication reconciliation:  No orders of the defined types were placed in this encounter.      REVIEW OF SYSTEMS:  10 point ROS of systems including Constitutional, Eyes, Respiratory, Cardiovascular, Gastroenterology, Genitourinary, Integumentary, Musculoskeletal, Psychiatric were all negative except for pertinent positives noted in my HPI.    Physical Exam:  /78  Pulse 64  Temp 97.1  F (36.2  C)  Resp 16  SpO2 97%  GENERAL APPEARANCE:  Alert, in no distress, pleasant, cooperative, oriented x self, place, recent events and wife/daughter  EYES:  EOM, lids, pupils and irises normal, sclera clear and conjunctiva normal, no discharge or mattering on lids or lashes noted  ENT:  Mouth normal, moist mucous membranes, nose normal without drainage or crusting, external ears without lesions, hearing acuity intact  RESP:  respiratory effort and palpation of chest normal, no chest wall tenderness, no respiratory distress, Lung sounds diminished at bases with faint crackles left base, patient is room air and 97%  CV:  Palpation and auscultation of heart done, rate and rhythm controlled and regular, no murmur, no rub or gallop. Edema +2 left lower leg and +1 right lower leg.   ABDOMEN:  normal bowel sounds, soft, nontender.  M/S:   Gait and station walks with assist, no tenderness or swelling of the joints; able to move all extremities   NEURO: cranial nerves 2-12 grossly intact, no facial asymmetry, no speech deficits and able to follow directions, moves all extremities symmetrically  PSYCH:  insight and judgement at baseline, memory appears impaired, affect and mood normal     Recent Labs:  CBC RESULTS:   Recent Labs   Lab Test  11/30/18   0700  11/29/18   0840   WBC  13.7*  14.0*   RBC  3.47*  3.29*   HGB  10.7*  10.3*   HCT  33.3*  31.6*   MCV  96  96   MCH  30.8   31.3   MCHC  32.1  32.6   RDW  13.8  14.0   PLT  438  383       Last Basic Metabolic Panel:  Recent Labs   Lab Test  11/29/18   0840  11/23/18   0551  11/22/18   0652   NA  143   --   143   POTASSIUM  4.3   --   4.5   CHLORIDE  112*   --   114*   AGNIESZKA  8.3*   --   8.0*   CO2  26   --   24   BUN  53*   --   44*   CR  2.20*  2.20*  2.21*   GLC  108*   --   115*         Assessment/Plan:  Urinary retention with incomplete bladder emptying  Dysuria  Leukocytosis, unspecified type  Acute on chronic. WBC improved, no further bladder pain or fevers. Failed voiding trial - urology consult. CBC on 12/6    Syncope, unspecified syncope type  Essential hypertension  History of CVA (cerebrovascular accident)  Acute on chronic. Monitor VS and continue meds as ordered. F/U with status next week.     Closed nondisplaced intertrochanteric fracture of left femur, sequela  Muscle weakness (generalized)  Acute on chronic. Therapies as ordered. F/U with ortho as planned. Meds as above. Staff to update provider if not effective.     Anemia due to blood loss, acute  Acute with surgery. Improved Hgb last check. F/U CBC 12/6    Chronic kidney disease, stage 4 (severe) (H)  History of nephrectomy  Chronic, stable. BMP 12/6. Avoid nephrotoxic meds.     Loose stools  Acute on chronic. Stop senna s. Monitor - if still loose will restart Colestipol 1 gm PO BID as PTA for diarrhea management.     Orders:  1. Change Senna S to 2 tabs PO BID PRN constipation.   2. If still having loose stools on 12/6 - staff to update NP  3. Schedule urology consult due to urinary retention, failed voiding trial at TCU  4. TEDs knee high on in AM off at HS diagnosis edema    Total time spent with patient visit was 35 min including patient visit, review of past records and conversation with wife/daughter and staff. Greater than 50% of total time spent with counseling and coordinating care due to review of concerns, current status, conversation with family about status  and POC to address issues.      Electronically signed by  JUAN PABLO Cohen CNP                      Sincerely,        JUAN PABLO Cohen CNP

## 2018-12-06 ENCOUNTER — HOSPITAL LABORATORY (OUTPATIENT)
Dept: OTHER | Facility: CLINIC | Age: 83
End: 2018-12-06

## 2018-12-06 LAB
ANION GAP SERPL CALCULATED.3IONS-SCNC: 7 MMOL/L (ref 3–14)
BUN SERPL-MCNC: 51 MG/DL (ref 7–30)
CALCIUM SERPL-MCNC: 8.3 MG/DL (ref 8.5–10.1)
CHLORIDE SERPL-SCNC: 113 MMOL/L (ref 94–109)
CO2 SERPL-SCNC: 23 MMOL/L (ref 20–32)
CREAT SERPL-MCNC: 2.08 MG/DL (ref 0.66–1.25)
ERYTHROCYTE [DISTWIDTH] IN BLOOD BY AUTOMATED COUNT: 14 % (ref 10–15)
GFR SERPL CREATININE-BSD FRML MDRD: 30 ML/MIN/1.7M2
GLUCOSE SERPL-MCNC: 98 MG/DL (ref 70–99)
HCT VFR BLD AUTO: 30.1 % (ref 40–53)
HGB BLD-MCNC: 9.8 G/DL (ref 13.3–17.7)
MCH RBC QN AUTO: 31.1 PG (ref 26.5–33)
MCHC RBC AUTO-ENTMCNC: 32.6 G/DL (ref 31.5–36.5)
MCV RBC AUTO: 96 FL (ref 78–100)
PLATELET # BLD AUTO: 489 10E9/L (ref 150–450)
POTASSIUM SERPL-SCNC: 4.7 MMOL/L (ref 3.4–5.3)
RBC # BLD AUTO: 3.15 10E12/L (ref 4.4–5.9)
SODIUM SERPL-SCNC: 143 MMOL/L (ref 133–144)
WBC # BLD AUTO: 10.6 10E9/L (ref 4–11)

## 2018-12-12 ENCOUNTER — NURSING HOME VISIT (OUTPATIENT)
Dept: GERIATRICS | Facility: CLINIC | Age: 83
End: 2018-12-12
Payer: COMMERCIAL

## 2018-12-12 VITALS
DIASTOLIC BLOOD PRESSURE: 59 MMHG | WEIGHT: 177.6 LBS | TEMPERATURE: 97.5 F | SYSTOLIC BLOOD PRESSURE: 116 MMHG | OXYGEN SATURATION: 96 % | HEART RATE: 67 BPM | BODY MASS INDEX: 25.48 KG/M2 | RESPIRATION RATE: 16 BRPM

## 2018-12-12 DIAGNOSIS — I10 ESSENTIAL HYPERTENSION: ICD-10-CM

## 2018-12-12 DIAGNOSIS — I63.9 CEREBROVASCULAR ACCIDENT (CVA), UNSPECIFIED MECHANISM (H): ICD-10-CM

## 2018-12-12 DIAGNOSIS — R33.9 URINARY RETENTION: ICD-10-CM

## 2018-12-12 DIAGNOSIS — N18.4 CKD (CHRONIC KIDNEY DISEASE) STAGE 4, GFR 15-29 ML/MIN (H): ICD-10-CM

## 2018-12-12 DIAGNOSIS — R53.81 PHYSICAL DECONDITIONING: ICD-10-CM

## 2018-12-12 DIAGNOSIS — S72.145D CLOSED NONDISPLACED INTERTROCHANTERIC FRACTURE OF LEFT FEMUR WITH ROUTINE HEALING, SUBSEQUENT ENCOUNTER: Primary | ICD-10-CM

## 2018-12-12 PROCEDURE — 99309 SBSQ NF CARE MODERATE MDM 30: CPT | Performed by: NURSE PRACTITIONER

## 2018-12-12 RX ORDER — CEPHALEXIN 500 MG/1
500 CAPSULE ORAL 3 TIMES DAILY
COMMUNITY
End: 2018-12-13

## 2018-12-12 RX ORDER — AMOXICILLIN 250 MG
2 CAPSULE ORAL 2 TIMES DAILY PRN
COMMUNITY

## 2018-12-12 NOTE — LETTER
12/12/2018        RE: Sourav Fine  [address]  [place] [address]        [facility]    Chief Complaint   Patient presents with     RECHECK       [place] Medical Record Number:  [id number]  [facility] of Service where encounter took place[facility] [[id number]]    HPI:    Sourav Fine is a 89 year old  (11/15/1925), who is being seen today for an episodic care visit.  HPI information obtained from: facility chart records, facility staff, patient report and [facility] chart review.Today's concern is:  Closed nondisplaced intertrochanteric fracture of left femur with routine healing, subsequent encounter  Patient transferred to TCU from hospital following repair of left intertrochanteric femur fracture on 11/19. Surgery was uneventful. injury happened due to fall. Activity is WBAT. DVT prophylaxis is ASA. He has been working with therapy at TCU.     Essential hypertension  BP's in TCU: 116/59, 170/76, 145/70    Urinary retention  Patient came to TCU with abdi catheter. He did attend urology appointment on 12/11.  Abdi was removed He was prescribed a few days of cephelexin.     CKD (chronic kidney disease) stage 4, GFR 15-29 ml/min (H)  H/o nephrectomy. Baseline creat 2.1-2.2  Peripheral neuropathy- he continues on gabapentin at .   Cerebrovascular accident (CVA), unspecified mechanism (H)  S/p left frontal lobe infarction in 5/2018    Physical deconditioning  Lives with wife in 3rd floor apartment. Prior to hospital stay he was independent with mobility. Therapy reports he is walking 26' with walker. CPT 4.5/5.6 and slums 17/30.          ALLERGIES: Patient has no known allergies.  Past Medical, Surgical, Family and Social History reviewed and updated in [facility].    Current Outpatient Medications   Medication Sig Dispense Refill     ACETAMINOPHEN PO Take 650 mg by mouth every 6 hours as needed For 1 week        amLODIPine (NORVASC) 2.5 MG tablet Take 1 tablet  (2.5 mg) by mouth daily       aspirin 325 MG EC tablet Take 1 tablet (325 mg) by mouth daily 45 tablet 0     cephALEXin (KEFLEX) 500 MG capsule Take 500 mg by mouth 3 times daily For 4 days       cyanocobalamin (VITAMIN  B-12) 1000 MCG tablet Take 1,000 mcg by mouth daily        gabapentin (NEURONTIN) 300 MG capsule Take 1,500 mg by mouth At Bedtime        loperamide (IMODIUM) 2 MG capsule Take 2 mg by mouth 2 times daily as needed for diarrhea       melatonin 5 MG tablet Take 5 mg by mouth nightly as needed for sleep       Multiple Vitamins-Minerals (PRESERVISION AREDS) TABS Take 1 tablet by mouth 2 times daily        OXYCODONE HCL PO Take 2.5 mg by mouth every 8 hours as needed       senna-docusate (SENOKOT-S/PERICOLACE) 8.6-50 MG tablet Take 2 tablets by mouth 2 times daily as needed for constipation       tamsulosin (FLOMAX) 0.4 MG capsule Take 1 capsule (0.4 mg) by mouth daily 60 capsule      Medications reviewed:  Medications reconciled to facility chart and changes were made to reflect current medications as identified as above med list. Below are the changes that were made:   Medications stopped since last EPIC medication reconciliation:   Medications Discontinued During This Encounter   Medication Reason     senna-docusate (SENOKOT-S;PERICOLACE) 8.6-50 MG per tablet Medication Reconciliation Clean Up       Medications started since last [facility] medication reconciliation:  Orders Placed This Encounter   Medications     cephALEXin (KEFLEX) 500 MG capsule     Sig: Take 500 mg by mouth 3 times daily For 4 days     melatonin 5 MG tablet     Sig: Take 5 mg by mouth nightly as needed for sleep     senna-docusate (SENOKOT-S/PERICOLACE) 8.6-50 MG tablet     Sig: Take 2 tablets by mouth 2 times daily as needed for constipation       REVIEW OF SYSTEMS:  4 point ROS including Respiratory, CV, GI and , other than that noted in the HPI,  is negative    Physical Exam:  /59   Pulse 67   Temp 97.5  F (36.4  C)    Resp 16   Wt 80.6 kg (177 lb 9.6 oz)   SpO2 96%   BMI 25.48 kg/m     GENERAL APPEARANCE:  Alert, in no distress  ENT:  Mouth and posterior oropharynx normal, moist mucous membranes, hearing acuity [place]  EYES:  EOM, conjunctivae, lids, pupils and irises normal  NECK:  No adenopathy,masses or thyromegaly  RESP:  respiratory effort and palpation of chest normal, no respiratory distress, Lung sounds clear  CV:  Palpation and auscultation of heart done , rate and rhythm reg, no murmur, no rub or gallop, Edema none  ABDOMEN:  normal bowel sounds, soft, nontender, no hepatosplenomegaly or other masses  M/S:   Gait and station not observed. , Digits and nails normal   SKIN:  Inspection/Palpation of skin and subcutaneous tissue no rash  NEURO: 2-12 in normal limits and at patient's baseline  PSYCH:  insight and judgement, memory mildly impaired , affect and mood normal      Recent Labs:  CBC RESULTS:   Recent Labs   Lab Test 12/06/18  0745 11/30/18  0700   WBC 10.6 13.7*   RBC 3.15* 3.47*   HGB 9.8* 10.7*   HCT 30.1* 33.3*   MCV 96 96   MCH 31.1 30.8   MCHC 32.6 32.1   RDW 14.0 13.8   * 438       Last Basic Metabolic Panel:  Recent Labs   Lab Test 12/06/18  0745 11/29/18  0840    143   POTASSIUM 4.7 4.3   CHLORIDE 113* 112*   AGNIESZKA 8.3* 8.3*   CO2 23 26   BUN 51* 53*   CR 2.08* 2.20*   GLC 98 108*       Assessment/Plan:  (S72.145D) Closed nondisplaced intertrochanteric fracture of left femur with routine healing, subsequent encounter  (primary encounter diagnosis)  Comment: recent hospitalization due to left hip fracture. S/p ORIF. He is working with therapy but only walking about 26' with walker  Plan: physical therapy     (I10) Essential hypertension  Comment: adequate control  Plan: no change    (R33.9) Urinary retention  Comment: abdi removed yesterday.   Plan: PVR q shift.     (N18.4) CKD (chronic kidney disease) stage 4, GFR 15-29 ml/min (H)  Comment: at baseline  Plan: avoid nephrotoxins    (I63.9)  Cerebrovascular accident (CVA), unspecified mechanism (H)  Comment: cognitive impairment and mobility limitations.   Plan: physical therapy and OCCUPATIONAL THERAPY     (R53.81) Physical deconditioning  Comment: due to recent fall with hip fracture. He is walking about 26' with therapy. Not ready to return to ILF.   Plan: physical therapy and OCCUPATIONAL THERAPY         Electronically signed by  JUAN PABLO Moreland CNP                      Sincerely,        JUAN PABLO Moreland CNP

## 2018-12-12 NOTE — PROGRESS NOTES
[facility]    Chief Complaint   Patient presents with     STORMY       [place] Medical Record Number:  [id number]  [facility][id number][facility]    HPI:    Sourav Fine is a 89 year old  (11/15/1925), who is being seen today for an episodic care visit.  HPI information obtained from: facility chart records, facility staff, patient report and [facility] chart review.Today's concern is:  Closed nondisplaced intertrochanteric fracture of left femur with routine healing, subsequent encounter  Patient transferred to TCU from hospital following repair of left intertrochanteric femur fracture on 11/19. Surgery was uneventful. injury happened due to fall. Activity is WBAT. DVT prophylaxis is ASA. He has been working with therapy at TCU.     Essential hypertension  BP's in TCU: 116/59, 170/76, 145/70    Urinary retention  Patient came to TCU with abdi catheter. He did attend urology appointment on 12/11.  Abdi was removed He was prescribed a few days of cephelexin.     CKD (chronic kidney disease) stage 4, GFR 15-29 ml/min (H)  H/o nephrectomy. Baseline creat 2.1-2.2  Peripheral neuropathy- he continues on gabapentin at .   Cerebrovascular accident (CVA), unspecified mechanism (H)  S/p left frontal lobe infarction in 5/2018    Physical deconditioning  Lives with wife in 3rd floor apartment. Prior to hospital stay he was independent with mobility. Therapy reports he is walking 26' with walker. CPT 4.5/5.6 and slums 17/30.          ALLERGIES: Patient has no known allergies.  Past Medical, Surgical, Family and Social History reviewed and updated in [facility].    Current Outpatient Medications   Medication Sig Dispense Refill     ACETAMINOPHEN PO Take 650 mg by mouth every 6 hours as needed For 1 week        amLODIPine (NORVASC) 2.5 MG tablet Take 1 tablet (2.5 mg) by mouth daily       aspirin 325 MG EC tablet Take 1 tablet (325 mg) by mouth  daily 45 tablet 0     cephALEXin (KEFLEX) 500 MG capsule Take 500 mg by mouth 3 times daily For 4 days       cyanocobalamin (VITAMIN  B-12) 1000 MCG tablet Take 1,000 mcg by mouth daily        gabapentin (NEURONTIN) 300 MG capsule Take 1,500 mg by mouth At Bedtime        loperamide (IMODIUM) 2 MG capsule Take 2 mg by mouth 2 times daily as needed for diarrhea       melatonin 5 MG tablet Take 5 mg by mouth nightly as needed for sleep       Multiple Vitamins-Minerals (PRESERVISION AREDS) TABS Take 1 tablet by mouth 2 times daily        OXYCODONE HCL PO Take 2.5 mg by mouth every 8 hours as needed       senna-docusate (SENOKOT-S/PERICOLACE) 8.6-50 MG tablet Take 2 tablets by mouth 2 times daily as needed for constipation       tamsulosin (FLOMAX) 0.4 MG capsule Take 1 capsule (0.4 mg) by mouth daily 60 capsule      Medications reviewed:  Medications reconciled to facility chart and changes were made to reflect current medications as identified as above med list. Below are the changes that were made:   Medications stopped since last EPIC medication reconciliation:   Medications Discontinued During This Encounter   Medication Reason     senna-docusate (SENOKOT-S;PERICOLACE) 8.6-50 MG per tablet Medication Reconciliation Clean Up       Medications started since last [facility] medication reconciliation:  Orders Placed This Encounter   Medications     cephALEXin (KEFLEX) 500 MG capsule     Sig: Take 500 mg by mouth 3 times daily For 4 days     melatonin 5 MG tablet     Sig: Take 5 mg by mouth nightly as needed for sleep     senna-docusate (SENOKOT-S/PERICOLACE) 8.6-50 MG tablet     Sig: Take 2 tablets by mouth 2 times daily as needed for constipation       REVIEW OF SYSTEMS:  4 point ROS including Respiratory, CV, GI and , other than that noted in the HPI,  is negative    Physical Exam:  /59   Pulse 67   Temp 97.5  F (36.4  C)   Resp 16   Wt 80.6 kg (177 lb 9.6 oz)   SpO2 96%   BMI 25.48 kg/m    GENERAL APPEARANCE:   Alert, in no distress  ENT:  Mouth and posterior oropharynx normal, moist mucous membranes, hearing acuity [place]  EYES:  EOM, conjunctivae, lids, pupils and irises normal  NECK:  No adenopathy,masses or thyromegaly  RESP:  respiratory effort and palpation of chest normal, no respiratory distress, Lung sounds clear  CV:  Palpation and auscultation of heart done , rate and rhythm reg, no murmur, no rub or gallop, Edema none  ABDOMEN:  normal bowel sounds, soft, nontender, no hepatosplenomegaly or other masses  M/S:   Gait and station not observed. , Digits and nails normal   SKIN:  Inspection/Palpation of skin and subcutaneous tissue no rash  NEURO: 2-12 in normal limits and at patient's baseline  PSYCH:  insight and judgement, memory mildly impaired , affect and mood normal      Recent Labs:  CBC RESULTS:   Recent Labs   Lab Test 12/06/18  0745 11/30/18  0700   WBC 10.6 13.7*   RBC 3.15* 3.47*   HGB 9.8* 10.7*   HCT 30.1* 33.3*   MCV 96 96   MCH 31.1 30.8   MCHC 32.6 32.1   RDW 14.0 13.8   * 438       Last Basic Metabolic Panel:  Recent Labs   Lab Test 12/06/18  0745 11/29/18  0840    143   POTASSIUM 4.7 4.3   CHLORIDE 113* 112*   AGNIESZKA 8.3* 8.3*   CO2 23 26   BUN 51* 53*   CR 2.08* 2.20*   GLC 98 108*       Assessment/Plan:  (S78.322L) Closed nondisplaced intertrochanteric fracture of left femur with routine healing, subsequent encounter  (primary encounter diagnosis)  Comment: recent hospitalization due to left hip fracture. S/p ORIF. He is working with therapy but only walking about 26' with walker  Plan: physical therapy     (I10) Essential hypertension  Comment: adequate control  Plan: no change    (R33.9) Urinary retention  Comment: abdi removed yesterday.   Plan: PVR q shift.     (N18.4) CKD (chronic kidney disease) stage 4, GFR 15-29 ml/min (H)  Comment: at baseline  Plan: avoid nephrotoxins    (I63.9) Cerebrovascular accident (CVA), unspecified mechanism (H)  Comment: cognitive impairment and  mobility limitations.   Plan: physical therapy and OCCUPATIONAL THERAPY     (R53.81) Physical deconditioning  Comment: due to recent fall with hip fracture. He is walking about 26' with therapy. Not ready to return to ILF.   Plan: physical therapy and OCCUPATIONAL THERAPY         Electronically signed by  JUAN PABLO Moreland CNP

## 2018-12-13 ENCOUNTER — NURSING HOME VISIT (OUTPATIENT)
Dept: GERIATRICS | Facility: CLINIC | Age: 83
End: 2018-12-13
Payer: COMMERCIAL

## 2018-12-13 VITALS
WEIGHT: 177.6 LBS | DIASTOLIC BLOOD PRESSURE: 78 MMHG | BODY MASS INDEX: 25.48 KG/M2 | HEART RATE: 73 BPM | OXYGEN SATURATION: 93 % | TEMPERATURE: 98 F | RESPIRATION RATE: 20 BRPM | SYSTOLIC BLOOD PRESSURE: 156 MMHG

## 2018-12-13 DIAGNOSIS — S72.145D CLOSED NONDISPLACED INTERTROCHANTERIC FRACTURE OF LEFT FEMUR WITH ROUTINE HEALING, SUBSEQUENT ENCOUNTER: ICD-10-CM

## 2018-12-13 DIAGNOSIS — R53.81 PHYSICAL DECONDITIONING: ICD-10-CM

## 2018-12-13 DIAGNOSIS — I63.9 CEREBROVASCULAR ACCIDENT (CVA), UNSPECIFIED MECHANISM (H): ICD-10-CM

## 2018-12-13 DIAGNOSIS — R33.9 URINARY RETENTION: Primary | ICD-10-CM

## 2018-12-13 DIAGNOSIS — N18.4 CKD (CHRONIC KIDNEY DISEASE) STAGE 4, GFR 15-29 ML/MIN (H): ICD-10-CM

## 2018-12-13 DIAGNOSIS — I10 ESSENTIAL HYPERTENSION: ICD-10-CM

## 2018-12-13 PROCEDURE — 99309 SBSQ NF CARE MODERATE MDM 30: CPT | Performed by: NURSE PRACTITIONER

## 2018-12-13 NOTE — PROGRESS NOTES
[facility]    Chief Complaint   Patient presents with     STORMY       [place] Medical Record Number:  [id number]  Place of Service where encounter took place:  [facility] [[id number]]    HPI:    Sourav Fine is a 89 year old  (11/15/1925), who is being seen today for an episodic care visit.  HPI information obtained from: facility chart records, facility staff, patient report and [facility] chart review.Today's concern is:  Urinary retention  Patient came to TCU with abdi catheter. He did attend urology appointment on 12/11.  Abdi was removed He was prescribed a few days of cephelexin.   Today c/o lower abd pain. Patient reports he has been urinating. No PVRs done.   Closed nondisplaced intertrochanteric fracture of left femur with routine healing, subsequent encounter  Patient transferred to TCU from hospital following repair of left intertrochanteric femur fracture on 11/19. Surgery was uneventful. injury happened due to fall. Activity is WBAT. DVT prophylaxis is ASA. He has been working with therapy at TCU.     Essential hypertension  BP's in TCU: 156/78, 116/59, 170/76      CKD (chronic kidney disease) stage 4, GFR 15-29 ml/min (H)  H/o nephrectomy. Baseline creat 2.1-2.2  Peripheral neuropathy- he continues on gabapentin at .   Cerebrovascular accident (CVA), unspecified mechanism (H)  S/p left frontal lobe infarction in 5/2018    Physical deconditioning  Lives with wife in 3rd floor apartment. Prior to hospital stay he was independent with mobility. Therapy reports he is walking 26' with walker. CPT 4.5/5.6 and slums 17/30.          ALLERGIES: Patient has no known allergies.  Past Medical, Surgical, Family and Social History reviewed and updated in [facility].    Current Outpatient Medications   Medication Sig Dispense Refill     ACETAMINOPHEN PO Take 650 mg by mouth every 6 hours as needed For 1 week        amLODIPine (NORVASC) 2.5 MG tablet Take 1 tablet (2.5 mg)  by mouth daily       aspirin 325 MG EC tablet Take 1 tablet (325 mg) by mouth daily 45 tablet 0     cyanocobalamin (VITAMIN  B-12) 1000 MCG tablet Take 1,000 mcg by mouth daily        gabapentin (NEURONTIN) 300 MG capsule Take 1,500 mg by mouth At Bedtime        loperamide (IMODIUM) 2 MG capsule Take 2 mg by mouth 2 times daily as needed for diarrhea       melatonin 5 MG tablet Take 5 mg by mouth nightly as needed for sleep       Multiple Vitamins-Minerals (PRESERVISION AREDS) TABS Take 1 tablet by mouth 2 times daily        OXYCODONE HCL PO Take 2.5 mg by mouth every 8 hours as needed       senna-docusate (SENOKOT-S/PERICOLACE) 8.6-50 MG tablet Take 2 tablets by mouth 2 times daily as needed for constipation       tamsulosin (FLOMAX) 0.4 MG capsule Take 1 capsule (0.4 mg) by mouth daily 60 capsule      Medications reviewed:  Medications reconciled to facility chart and changes were made to reflect current medications as identified as above med list. Below are the changes that were made:   Medications stopped since last EPIC medication reconciliation:   Medications Discontinued During This Encounter   Medication Reason     cephALEXin (KEFLEX) 500 MG capsule Medication Reconciliation Clean Up       Medications started since last [facility] medication reconciliation:  No orders of the defined types were placed in this encounter.      REVIEW OF SYSTEMS:  4 point ROS including Respiratory, CV, GI and , other than that noted in the HPI,  is negative    Physical Exam:  /78   Pulse 73   Temp 98  F (36.7  C)   Resp 20   Wt 80.6 kg (177 lb 9.6 oz)   SpO2 93%   BMI 25.48 kg/m    GENERAL APPEARANCE:  Alert, in no distress  ENT:  Mouth and posterior oropharynx normal, moist mucous membranes, hearing acuity [place]  EYES:  EOM, conjunctivae, lids, pupils and irises normal  NECK:  No adenopathy,masses or thyromegaly  RESP:  respiratory effort and palpation of chest normal, no respiratory distress, Lung sounds clear  CV:   Palpation and auscultation of heart done , rate and rhythm reg, no murmur, no rub or gallop, Edema none  ABDOMEN:  normal bowel sounds, soft, tender in suprapubic area, no hepatosplenomegaly or other masses  M/S:   Gait and station not observed. , Digits and nails normal   SKIN:  Inspection/Palpation of skin and subcutaneous tissue no rash  NEURO: 2-12 in normal limits and at patient's baseline  PSYCH:  insight and judgement, memory mildly impaired , affect and mood normal      Recent Labs:  CBC RESULTS:   Recent Labs   Lab Test 12/06/18  0745 11/30/18  0700   WBC 10.6 13.7*   RBC 3.15* 3.47*   HGB 9.8* 10.7*   HCT 30.1* 33.3*   MCV 96 96   MCH 31.1 30.8   MCHC 32.6 32.1   RDW 14.0 13.8   * 438       Last Basic Metabolic Panel:  Recent Labs   Lab Test 12/06/18  0745 11/29/18  0840    143   POTASSIUM 4.7 4.3   CHLORIDE 113* 112*   AGNIESZKA 8.3* 8.3*   CO2 23 26   BUN 51* 53*   CR 2.08* 2.20*   GLC 98 108*       Assessment/Plan:  (R33.9) Urinary retention  Comment: abdi removed yesterday. pVR today >1000cc.   Plan: replace abdi catheter.     (S77.988R) Closed nondisplaced intertrochanteric fracture of left femur with routine healing, subsequent encounter  (primary encounter diagnosis)  Comment: recent hospitalization due to left hip fracture. S/p ORIF. He is working with therapy but only walking about 26' with walker  Plan: physical therapy     (I10) Essential hypertension  Comment: adequate control  Plan: no change        (N18.4) CKD (chronic kidney disease) stage 4, GFR 15-29 ml/min (H)  Comment: at baseline  Plan: avoid nephrotoxins    (I63.9) Cerebrovascular accident (CVA), unspecified mechanism (H)  Comment: cognitive impairment and mobility limitations.   Plan: physical therapy and OCCUPATIONAL THERAPY     (R53.81) Physical deconditioning  Comment: due to recent fall with hip fracture. He is walking about 26' with therapy. Not ready to return to [facility].   Plan: physical therapy and OCCUPATIONAL THERAPY          Electronically signed by  JUAN PABLO Moreland CNP

## 2018-12-13 NOTE — LETTER
12/13/2018        RE: Sourav Fine  [address]  [place] [address]        [facility]    Chief Complaint   Patient presents with     RECHECK       [place] Medical Record Number:  [id number]  Place of Service where encounter took place:  [facility] [[id number]]    HPI:    Sourav Fine is a 89 year old  (11/15/1925), who is being seen today for an episodic care visit.  HPI information obtained from: facility chart records, facility staff, patient report and [facility] chart review.Today's concern is:  Urinary retention  Patient came to TCU with abdi catheter. He did attend urology appointment on 12/11.  Abdi was removed He was prescribed a few days of cephelexin.   Today c/o lower abd pain. Patient reports he has been urinating. No PVRs done.   Closed nondisplaced intertrochanteric fracture of left femur with routine healing, subsequent encounter  Patient transferred to TCU from hospital following repair of left intertrochanteric femur fracture on 11/19. Surgery was uneventful. injury happened due to fall. Activity is WBAT. DVT prophylaxis is ASA. He has been working with therapy at TCU.     Essential hypertension  BP's in TCU: 156/78, 116/59, 170/76      CKD (chronic kidney disease) stage 4, GFR 15-29 ml/min (H)  H/o nephrectomy. Baseline creat 2.1-2.2  Peripheral neuropathy- he continues on gabapentin at hs.   Cerebrovascular accident (CVA), unspecified mechanism (H)  S/p left frontal lobe infarction in 5/2018    Physical deconditioning  Lives with wife in 3rd floor apartment. Prior to hospital stay he was independent with mobility. Therapy reports he is walking 26' with walker. CPT 4.5/5.6 and slums 17/30.          ALLERGIES: Patient has no known allergies.  Past Medical, Surgical, Family and Social History reviewed and updated in [facility].    Current Outpatient Medications   Medication Sig Dispense Refill     ACETAMINOPHEN PO Take 650 mg by mouth every 6 hours  as needed For 1 week        amLODIPine (NORVASC) 2.5 MG tablet Take 1 tablet (2.5 mg) by mouth daily       aspirin 325 MG EC tablet Take 1 tablet (325 mg) by mouth daily 45 tablet 0     cyanocobalamin (VITAMIN  B-12) 1000 MCG tablet Take 1,000 mcg by mouth daily        gabapentin (NEURONTIN) 300 MG capsule Take 1,500 mg by mouth At Bedtime        loperamide (IMODIUM) 2 MG capsule Take 2 mg by mouth 2 times daily as needed for diarrhea       melatonin 5 MG tablet Take 5 mg by mouth nightly as needed for sleep       Multiple Vitamins-Minerals (PRESERVISION AREDS) TABS Take 1 tablet by mouth 2 times daily        OXYCODONE HCL PO Take 2.5 mg by mouth every 8 hours as needed       senna-docusate (SENOKOT-S/PERICOLACE) 8.6-50 MG tablet Take 2 tablets by mouth 2 times daily as needed for constipation       tamsulosin (FLOMAX) 0.4 MG capsule Take 1 capsule (0.4 mg) by mouth daily 60 capsule      Medications reviewed:  Medications reconciled to facility chart and changes were made to reflect current medications as identified as above med list. Below are the changes that were made:   Medications stopped since last EPIC medication reconciliation:   Medications Discontinued During This Encounter   Medication Reason     cephALEXin (KEFLEX) 500 MG capsule Medication Reconciliation Clean Up       Medications started since last [facility] medication reconciliation:  No orders of the defined types were placed in this encounter.      REVIEW OF SYSTEMS:  4 point ROS including Respiratory, CV, GI and , other than that noted in the HPI,  is negative    Physical Exam:  /78   Pulse 73   Temp 98  F (36.7  C)   Resp 20   Wt 80.6 kg (177 lb 9.6 oz)   SpO2 93%   BMI 25.48 kg/m     GENERAL APPEARANCE:  Alert, in no distress  ENT:  Mouth and posterior oropharynx normal, moist mucous membranes, hearing acuity [place]  EYES:  EOM, conjunctivae, lids, pupils and irises normal  NECK:  No adenopathy,masses or thyromegaly  RESP:  respiratory  effort and palpation of chest normal, no respiratory distress, Lung sounds clear  CV:  Palpation and auscultation of heart done , rate and rhythm reg, no murmur, no rub or gallop, Edema none  ABDOMEN:  normal bowel sounds, soft, tender in suprapubic area, no hepatosplenomegaly or other masses  M/S:   Gait and station not observed. , Digits and nails normal   SKIN:  Inspection/Palpation of skin and subcutaneous tissue no rash  NEURO: 2-12 in normal limits and at patient's baseline  PSYCH:  insight and judgement, memory mildly impaired , affect and mood normal      Recent Labs:  CBC RESULTS:   Recent Labs   Lab Test 12/06/18  0745 11/30/18  0700   WBC 10.6 13.7*   RBC 3.15* 3.47*   HGB 9.8* 10.7*   HCT 30.1* 33.3*   MCV 96 96   MCH 31.1 30.8   MCHC 32.6 32.1   RDW 14.0 13.8   * 438       Last Basic Metabolic Panel:  Recent Labs   Lab Test 12/06/18  0745 11/29/18  0840    143   POTASSIUM 4.7 4.3   CHLORIDE 113* 112*   AGNIESZKA 8.3* 8.3*   CO2 23 26   BUN 51* 53*   CR 2.08* 2.20*   GLC 98 108*       Assessment/Plan:  (R33.9) Urinary retention  Comment: abdi removed yesterday. pVR today >1000cc.   Plan: replace abdi catheter.     (M74.550B) Closed nondisplaced intertrochanteric fracture of left femur with routine healing, subsequent encounter  (primary encounter diagnosis)  Comment: recent hospitalization due to left hip fracture. S/p ORIF. He is working with therapy but only walking about 26' with walker  Plan: physical therapy     (I10) Essential hypertension  Comment: adequate control  Plan: no change        (N18.4) CKD (chronic kidney disease) stage 4, GFR 15-29 ml/min (H)  Comment: at baseline  Plan: avoid nephrotoxins    (I63.9) Cerebrovascular accident (CVA), unspecified mechanism (H)  Comment: cognitive impairment and mobility limitations.   Plan: physical therapy and OCCUPATIONAL THERAPY     (R53.81) Physical deconditioning  Comment: due to recent fall with hip fracture. He is walking about 26' with  therapy. Not ready to return to [facility].   Plan: physical therapy and OCCUPATIONAL THERAPY         Electronically signed by  JUAN PABLO Moreland CNP                    Sincerely,        JUAN PABLO Moreland CNP

## 2018-12-14 ENCOUNTER — NURSING HOME VISIT (OUTPATIENT)
Dept: GERIATRICS | Facility: CLINIC | Age: 83
End: 2018-12-14
Payer: COMMERCIAL

## 2018-12-14 DIAGNOSIS — M79.605 PAIN OF LEFT LOWER EXTREMITY: Primary | ICD-10-CM

## 2018-12-14 NOTE — PROGRESS NOTES
Ortho Nursing home visit    Sourav Fine is a 89 year old male who resides at [facility] following Left hip fracture    Patient is seen today for discussion regarding request from Dr Lee Wang Yesterday at post-op visit, with resulting Left high pain, Dr Wang feels is not related to the ORIF of his left hip, Dr Wang sent orders back to [place] for MRI L- Spine to R/O Pathology that may contribute to pain in Left LE      Past Medical History:   Diagnosis Date     Arthritis      Hypertension       Past Surgical History:   Procedure Laterality Date     OPEN REDUCTION INTERNAL FIXATION HIP NAILING Left 11/19/2018    Procedure: OPEN REDUCTION INTERNAL FIXATION LEFT HIP NAILING.;  Surgeon: Mingo Wang MD;  Location:  OR     ORTHOPEDIC SURGERY          No Known Allergies   There were no vitals taken for this visit.     Exam: Seated. C/o thigh pain, anterior/ quads, denies numbness, denies lower leg pain, today pain is localized in Left thigh      X-rays show : Long IM nail     ASSESSMENT / PLAN:  (M79.605) Pain of left lower extremity  (primary encounter diagnosis)  Comment: Discussed with patient R&B   And will help facilitate orders for MRI  Plan: MR Lumbar Spine w/o Contrast              [id number]      [facility]  [phone]

## 2018-12-18 ENCOUNTER — NURSING HOME VISIT (OUTPATIENT)
Dept: GERIATRICS | Facility: CLINIC | Age: 83
End: 2018-12-18
Payer: COMMERCIAL

## 2018-12-18 VITALS
TEMPERATURE: 97.7 F | OXYGEN SATURATION: 96 % | HEART RATE: 59 BPM | WEIGHT: 177.6 LBS | DIASTOLIC BLOOD PRESSURE: 67 MMHG | RESPIRATION RATE: 16 BRPM | BODY MASS INDEX: 25.48 KG/M2 | SYSTOLIC BLOOD PRESSURE: 123 MMHG

## 2018-12-18 DIAGNOSIS — S72.145D CLOSED NONDISPLACED INTERTROCHANTERIC FRACTURE OF LEFT FEMUR WITH ROUTINE HEALING, SUBSEQUENT ENCOUNTER: ICD-10-CM

## 2018-12-18 DIAGNOSIS — I63.9 CEREBROVASCULAR ACCIDENT (CVA), UNSPECIFIED MECHANISM (H): ICD-10-CM

## 2018-12-18 DIAGNOSIS — R53.81 PHYSICAL DECONDITIONING: ICD-10-CM

## 2018-12-18 DIAGNOSIS — N18.4 CKD (CHRONIC KIDNEY DISEASE) STAGE 4, GFR 15-29 ML/MIN (H): ICD-10-CM

## 2018-12-18 DIAGNOSIS — I10 ESSENTIAL HYPERTENSION: ICD-10-CM

## 2018-12-18 DIAGNOSIS — R33.9 URINARY RETENTION: Primary | ICD-10-CM

## 2018-12-18 PROCEDURE — 99309 SBSQ NF CARE MODERATE MDM 30: CPT | Performed by: NURSE PRACTITIONER

## 2018-12-18 NOTE — LETTER
12/18/2018        RE: Sourav Fine  [address]  [place] [address]        [facility]    Chief Complaint   Patient presents with     RECHECK       [place] Medical Record Number:  [id number]  Place of Service where encounter took place:  [facility] [[id number]]    HPI:    Sourav Fine is a 89 year old  (11/15/1925), who is being seen today for an episodic care visit.  HPI information obtained from: facility chart records, facility staff, patient report and [facility] chart review.Today's concern is:  Urinary retention  Patient came to TCU with abdi catheter. He did attend urology appointment on 12/11.  Abdi was removed He was prescribed a few days of cephelexin.   On 12/13 he c/o lower abd pain. Patient found to have 1000cc in bladder. Abdi replaced. He is on flomax. He is discharging soon.   Closed nondisplaced intertrochanteric fracture of left femur with routine healing, subsequent encounter  Patient transferred to TCU from hospital following repair of left intertrochanteric femur fracture on 11/19. Surgery was uneventful. injury happened due to fall. Activity is WBAT. DVT prophylaxis is ASA. He has been working with therapy at TCU.     Essential hypertension  BP's in TCU: 123/67, 128/59, 121/60    CKD (chronic kidney disease) stage 4, GFR 15-29 ml/min (H)  H/o nephrectomy. Baseline creat 2.1-2.2  Lab Results   Component Value Date    CR 2.08 12/06/2018       Peripheral neuropathy- he continues on gabapentin at .   Cerebrovascular accident (CVA), unspecified mechanism (H)  S/p left frontal lobe infarction in 5/2018    Physical deconditioning  Lives with wife in 3rd floor apartment. Prior to hospital stay he was independent with mobility. Therapy reports he is walking 26' with walker. CPT 4.5/5.6 and slums 17/30. Plans are being made for discharge later in week.          ALLERGIES: Patient has no known allergies.  Past Medical, Surgical, Family and  Social History reviewed and updated in [facility].    Current Outpatient Medications   Medication Sig Dispense Refill     ACETAMINOPHEN PO Take 650 mg by mouth every 6 hours as needed For 1 week        amLODIPine (NORVASC) 2.5 MG tablet Take 1 tablet (2.5 mg) by mouth daily       aspirin 325 MG EC tablet Take 1 tablet (325 mg) by mouth daily 45 tablet 0     cyanocobalamin (VITAMIN  B-12) 1000 MCG tablet Take 1,000 mcg by mouth daily        gabapentin (NEURONTIN) 300 MG capsule Take 1,500 mg by mouth At Bedtime        loperamide (IMODIUM) 2 MG capsule Take 2 mg by mouth 2 times daily as needed for diarrhea       melatonin 5 MG tablet Take 5 mg by mouth nightly as needed for sleep       Multiple Vitamins-Minerals (PRESERVISION AREDS) TABS Take 1 tablet by mouth 2 times daily        OXYCODONE HCL PO Take 2.5 mg by mouth every 8 hours as needed       senna-docusate (SENOKOT-S/PERICOLACE) 8.6-50 MG tablet Take 2 tablets by mouth 2 times daily as needed for constipation       tamsulosin (FLOMAX) 0.4 MG capsule Take 1 capsule (0.4 mg) by mouth daily 60 capsule      Medications reviewed:  Medications reconciled to facility chart and changes were made to reflect current medications as identified as above med list. Below are the changes that were made:   Medications stopped since last EPIC medication reconciliation:   There are no discontinued medications.    Medications started since last [facility] medication reconciliation:  No orders of the defined types were placed in this encounter.      REVIEW OF SYSTEMS:  4 point ROS including Respiratory, CV, GI and , other than that noted in the HPI,  is negative    Physical Exam:  /67   Pulse 59   Temp 97.7  F (36.5  C)   Resp 16   Wt 80.6 kg (177 lb 9.6 oz)   SpO2 96%   BMI 25.48 kg/m     GENERAL APPEARANCE:  Alert, in no distress  ENT:  Mouth and posterior oropharynx normal, moist mucous membranes, hearing acuity [place]  EYES:  EOM, conjunctivae, lids, pupils and irises  normal  NECK:  No adenopathy,masses or thyromegaly  RESP:  respiratory effort and palpation of chest normal, no respiratory distress, Lung sounds clear  CV:  Palpation and auscultation of heart done , rate and rhythm reg, no murmur, no rub or gallop, Edema none  ABDOMEN:  normal bowel sounds, soft,no hepatosplenomegaly or other masses  M/S:   Gait and station not observed. , Digits and nails normal   SKIN:  Inspection/Palpation of skin and subcutaneous tissue no rash  NEURO: 2-12 in normal limits and at patient's baseline  PSYCH:  insight and judgement, memory mildly impaired , affect and mood normal      Recent Labs:  CBC RESULTS:   Recent Labs   Lab Test 12/06/18  0745 11/30/18  0700   WBC 10.6 13.7*   RBC 3.15* 3.47*   HGB 9.8* 10.7*   HCT 30.1* 33.3*   MCV 96 96   MCH 31.1 30.8   MCHC 32.6 32.1   RDW 14.0 13.8   * 438       Last Basic Metabolic Panel:  Recent Labs   Lab Test 12/06/18  0745 11/29/18  0840    143   POTASSIUM 4.7 4.3   CHLORIDE 113* 112*   AGNIESZKA 8.3* 8.3*   CO2 23 26   BUN 51* 53*   CR 2.08* 2.20*   GLC 98 108*       Assessment/Plan:  (R33.9) Urinary retention  Comment: abdi in place.    Plan: remove abdi catheter. And monitor PVR.     (S74.242D) Closed nondisplaced intertrochanteric fracture of left femur with routine healing, subsequent encounter  (primary encounter diagnosis)  Comment: recent hospitalization due to left hip fracture. S/p ORIF. He is working with therapy but only walking about 26' with walker. He can transfer ok.   Plan: physical therapy. He will need a wheelchair for home.     (I10) Essential hypertension  Comment: adequate control  Plan: no change        (N18.4) CKD (chronic kidney disease) stage 4, GFR 15-29 ml/min (H)  Comment: at baseline  Plan: avoid nephrotoxins    (I63.9) Cerebrovascular accident (CVA), unspecified mechanism (H)  Comment: cognitive impairment and mobility limitations.   Plan: physical therapy and OCCUPATIONAL THERAPY     (R53.81) Physical  deconditioning  Comment: due to recent fall with hip fracture. He is walking about 20-50' with therapy. TUG 68.5sec. He is high fall risk  Plan: physical therapy and OCCUPATIONAL THERAPY         Electronically signed by  JUAN PABLO Moreland CNP        Face to Face and Medical Necessity Statement for DME Provider visit    Demographic Information on Sourav Fine:  Gender: male  : 11/15/1925  [address]   [place] [address]  [phone] (home)     Medical Record: [id number]  Social Security Number: [id number]  Primary Care Provider: Nick Finney  Insurance: Payor: MEDICA / Plan: MEDICA PRIME SOLUTION / Product Type: Indemnity /     HPI:   Sourav Fine is a 89 year old  (11/15/1925), who is being seen today for a face to face provider visit at [facility]; medical necessity statement for DME included. This patient requires the following:  DME Ordered and Medical Necessity Statement   Mechanical Wheelchair  Size: 18 x 18  Corresponding cushion: Yes: seat and back  Standard foot rests: Yes  Elevating leg rests: No  Arm rests: Yes:   Lap tray: No  Dose patient use oxygen? No   Able to propel w/c? Yes    Mobility related ADL that are affected in the home:  Dressing, toileting, grooming and hygeine  The wheelchair is suitable and necessary for use in the patient's home.  Reason why a cane or walker will not meet the patient's needs: poor balance and limited distances due to fatigue  The patient has expressed willingness to use the wheelchair in the home and does have the physical and cognitive ability to maneuver the equipment or has a caregiver who is available, willing, and able to provide assistance in the home with the wheelchair.   Patients functional mobility deficit can be sufficiently resolved by the use of the above wheelchair        Pt needing above DME with expected length of need of 99   months  due to medical necessity associated with following diagnosis:     Urinary retention  Closed  nondisplaced intertrochanteric fracture of left femur with routine healing, subsequent encounter  Essential hypertension  CKD (chronic kidney disease) stage 4, GFR 15-29 ml/min (H)  Cerebrovascular accident (CVA), unspecified mechanism (H)  Physical deconditioning      PMH   has a past medical history of Arthritis and Hypertension.    ROS:4 point ROS including Respiratory, CV, GI and , other than that noted in the HPI,  is negative    EXAM  Vitals: /67   Pulse 59   Temp 97.7  F (36.5  C)   Resp 16   Wt 80.6 kg (177 lb 9.6 oz)   SpO2 96%   BMI 25.48 kg/m   ;BMI= Body mass index is 25.48 kg/m .  GENERAL APPEARANCE:  Alert, in no distress  ENT:  Mouth and posterior oropharynx normal, moist mucous membranes, hearing acuity [place]  EYES:  EOM, conjunctivae, lids, pupils and irises normal  NECK:  No adenopathy,masses or thyromegaly  RESP:  respiratory effort and palpation of chest normal, no respiratory distress, Lung sounds clear  CV:  Palpation and auscultation of heart done , rate and rhythm reg, no murmur, no rub or gallop, Edema none  ABDOMEN:  normal bowel sounds, soft,no hepatosplenomegaly or other masses  M/S:   Gait and station not observed. , Digits and nails normal   SKIN:  Inspection/Palpation of skin and subcutaneous tissue no rash  NEURO: 2-12 in normal limits and at patient's baseline  PSYCH:  insight and judgement, memory mildly impaired , affect and mood normal    ASSESSMENT/PLAN:  1. Urinary retention    2. Closed nondisplaced intertrochanteric fracture of left femur with routine healing, subsequent encounter    3. Essential hypertension    4. CKD (chronic kidney disease) stage 4, GFR 15-29 ml/min (H)    5. Cerebrovascular accident (CVA), unspecified mechanism (H)    6. Physical deconditioning        Orders:  1. One mechanical wheelchair 18x18 with seat and back cushion. Standard foot rests    ELECTRONICALLY SIGNED BY JEAN CLAUDE CERTIFIED PROVIDER:  JUAN PABLO Moreland CNP   NPI:  [id number]  [facility]  [address] [place] [address], [address]  [place] [address]              Sincerely,        JUAN PABLO Moreland CNP

## 2018-12-18 NOTE — LETTER
12/18/2018        RE: Sourav Fine  [address]  [place] [address]            [facility]    Chief Complaint   Patient presents with     RECHECK       [facility] Medical Record Number:  [id number]  Place of Service where encounter took place:  [facility] [[id number]]    HPI:    Sourav Fine is a 89 year old  (11/15/1925), who is being seen today for an episodic care visit.  HPI information obtained from: facility chart records, facility staff, patient report and [facility] chart review.Today's concern is:  Urinary retention  Patient came to TCU with abdi catheter. He did attend urology appointment on 12/11.  Abdi was removed He was prescribed a few days of cephelexin.   Today c/o lower abd pain. Patient reports he has been urinating. No PVRs done.   Closed nondisplaced intertrochanteric fracture of left femur with routine healing, subsequent encounter  Patient transferred to TCU from hospital following repair of left intertrochanteric femur fracture on 11/19. Surgery was uneventful. injury happened due to fall. Activity is WBAT. DVT prophylaxis is ASA. He has been working with therapy at TCU.     Essential hypertension  BP's in TCU: 123/67, 128/59, 121/60      CKD (chronic kidney disease) stage 4, GFR 15-29 ml/min (H)  H/o nephrectomy. Baseline creat 2.1-2.2  Peripheral neuropathy- he continues on gabapentin at hs.   Cerebrovascular accident (CVA), unspecified mechanism (H)  S/p left frontal lobe infarction in 5/2018    Physical deconditioning  Lives with wife in 3rd floor apartment. Prior to hospital stay he was independent with mobility. Therapy reports he is walking 26' with walker. CPT 4.5/5.6 and slums 17/30.          ALLERGIES: Patient has no known allergies.  Past Medical, Surgical, Family and Social History reviewed and updated in [facility].    Current Outpatient Medications   Medication Sig Dispense Refill     ACETAMINOPHEN PO Take 650 mg by mouth every 6  hours as needed For 1 week        amLODIPine (NORVASC) 2.5 MG tablet Take 1 tablet (2.5 mg) by mouth daily       aspirin 325 MG EC tablet Take 1 tablet (325 mg) by mouth daily 45 tablet 0     cyanocobalamin (VITAMIN  B-12) 1000 MCG tablet Take 1,000 mcg by mouth daily        gabapentin (NEURONTIN) 300 MG capsule Take 1,500 mg by mouth At Bedtime        loperamide (IMODIUM) 2 MG capsule Take 2 mg by mouth 2 times daily as needed for diarrhea       melatonin 5 MG tablet Take 5 mg by mouth nightly as needed for sleep       Multiple Vitamins-Minerals (PRESERVISION AREDS) TABS Take 1 tablet by mouth 2 times daily        OXYCODONE HCL PO Take 2.5 mg by mouth every 8 hours as needed       senna-docusate (SENOKOT-S/PERICOLACE) 8.6-50 MG tablet Take 2 tablets by mouth 2 times daily as needed for constipation       tamsulosin (FLOMAX) 0.4 MG capsule Take 1 capsule (0.4 mg) by mouth daily 60 capsule      Medications reviewed:  Medications reconciled to facility chart and changes were made to reflect current medications as identified as above med list. Below are the changes that were made:   Medications stopped since last EPIC medication reconciliation:   There are no discontinued medications.    Medications started since last [facility] medication reconciliation:  No orders of the defined types were placed in this encounter.      REVIEW OF SYSTEMS:  4 point ROS including Respiratory, CV, GI and , other than that noted in the HPI,  is negative    Physical Exam:  /67   Pulse 59   Temp 97.7  F (36.5  C)   Resp 16   Wt 80.6 kg (177 lb 9.6 oz)   SpO2 96%   BMI 25.48 kg/m     GENERAL APPEARANCE:  Alert, in no distress  ENT:  Mouth and posterior oropharynx normal, moist mucous membranes, hearing acuity [place]  EYES:  EOM, conjunctivae, lids, pupils and irises normal  NECK:  No adenopathy,masses or thyromegaly  RESP:  respiratory effort and palpation of chest normal, no respiratory distress, Lung sounds clear  CV:  Palpation and  auscultation of heart done , rate and rhythm reg, no murmur, no rub or gallop, Edema none  ABDOMEN:  normal bowel sounds, soft, tender in suprapubic area, no hepatosplenomegaly or other masses  M/S:   Gait and station not observed. , Digits and nails normal   SKIN:  Inspection/Palpation of skin and subcutaneous tissue no rash  NEURO: 2-12 in normal limits and at patient's baseline  PSYCH:  insight and judgement, memory mildly impaired , affect and mood normal      Recent Labs:  CBC RESULTS:   Recent Labs   Lab Test 12/06/18  0745 11/30/18  0700   WBC 10.6 13.7*   RBC 3.15* 3.47*   HGB 9.8* 10.7*   HCT 30.1* 33.3*   MCV 96 96   MCH 31.1 30.8   MCHC 32.6 32.1   RDW 14.0 13.8   * 438       Last Basic Metabolic Panel:  Recent Labs   Lab Test 12/06/18  0745 11/29/18  0840    143   POTASSIUM 4.7 4.3   CHLORIDE 113* 112*   AGNIESZKA 8.3* 8.3*   CO2 23 26   BUN 51* 53*   CR 2.08* 2.20*   GLC 98 108*       Assessment/Plan:  (R33.9) Urinary retention  Comment: abdi removed yesterday. pVR today >1000cc.   Plan: replace abdi catheter.     (S70.326V) Closed nondisplaced intertrochanteric fracture of left femur with routine healing, subsequent encounter  (primary encounter diagnosis)  Comment: recent hospitalization due to left hip fracture. S/p ORIF. He is working with therapy but only walking about 26' with walker  Plan: physical therapy     (I10) Essential hypertension  Comment: adequate control  Plan: no change        (N18.4) CKD (chronic kidney disease) stage 4, GFR 15-29 ml/min (H)  Comment: at baseline  Plan: avoid nephrotoxins    (I63.9) Cerebrovascular accident (CVA), unspecified mechanism (H)  Comment: cognitive impairment and mobility limitations.   Plan: physical therapy and OCCUPATIONAL THERAPY     (R53.81) Physical deconditioning  Comment: due to recent fall with hip fracture. He is walking about 26' with therapy. Not ready to return to [facility].   Plan: physical therapy and OCCUPATIONAL THERAPY          Electronically signed by  Sumaya Sam MA                [facility]    Chief Complaint   Patient presents with     STORMY       [facility] Medical Record Number:  [id number]  [id number]    HPI:    Sourav Fine is a 89 year old  (11/15/1925), who is being seen today for an episodic care visit.  HPI information obtained from: facility chart records, facility staff, patient report and [facility] chart review.Today's concern is:  Urinary retention  Patient came to TCU with abdi catheter. He did attend urology appointment on 12/11.  Abdi was removed He was prescribed a few days of cephelexin.   On 12/13 he c/o lower abd pain. Patient found to have 1000cc in bladder. Abdi replaced. He is on flomax. He is discharging soon.   Closed nondisplaced intertrochanteric fracture of left femur with routine healing, subsequent encounter  Patient transferred to TCU from hospital following repair of left intertrochanteric femur fracture on 11/19. Surgery was uneventful. injury happened due to fall. Activity is WBAT. DVT prophylaxis is ASA. He has been working with therapy at TCU.     Essential hypertension  BP's in TCU: 123/67, 128/59, 121/60    CKD (chronic kidney disease) stage 4, GFR 15-29 ml/min (H)  H/o nephrectomy. Baseline creat 2.1-2.2  Lab Results   Component Value Date    CR 2.08 12/06/2018       Peripheral neuropathy- he continues on gabapentin at .   Cerebrovascular accident (CVA), unspecified mechanism (H)  S/p left frontal lobe infarction in 5/2018    Physical deconditioning  Lives with wife in 3rd floor apartment. Prior to hospital stay he was independent with mobility. Therapy reports he is walking 26' with walker. CPT 4.5/5.6 and slums 17/30. Plans are being made for discharge later in week.          ALLERGIES: Patient has no known allergies.  Past Medical, Surgical, Family and Social History reviewed  and updated in [facility].    Current Outpatient Medications   Medication Sig Dispense Refill     ACETAMINOPHEN PO Take 650 mg by mouth every 6 hours as needed For 1 week        amLODIPine (NORVASC) 2.5 MG tablet Take 1 tablet (2.5 mg) by mouth daily       aspirin 325 MG EC tablet Take 1 tablet (325 mg) by mouth daily 45 tablet 0     cyanocobalamin (VITAMIN  B-12) 1000 MCG tablet Take 1,000 mcg by mouth daily        gabapentin (NEURONTIN) 300 MG capsule Take 1,500 mg by mouth At Bedtime        loperamide (IMODIUM) 2 MG capsule Take 2 mg by mouth 2 times daily as needed for diarrhea       melatonin 5 MG tablet Take 5 mg by mouth nightly as needed for sleep       Multiple Vitamins-Minerals (PRESERVISION AREDS) TABS Take 1 tablet by mouth 2 times daily        OXYCODONE HCL PO Take 2.5 mg by mouth every 8 hours as needed       senna-docusate (SENOKOT-S/PERICOLACE) 8.6-50 MG tablet Take 2 tablets by mouth 2 times daily as needed for constipation       tamsulosin (FLOMAX) 0.4 MG capsule Take 1 capsule (0.4 mg) by mouth daily 60 capsule      Medications reviewed:  Medications reconciled to facility chart and changes were made to reflect current medications as identified as above med list. Below are the changes that were made:   Medications stopped since last EPIC medication reconciliation:   There are no discontinued medications.    Medications started since last [facility] medication reconciliation:  No orders of the defined types were placed in this encounter.      REVIEW OF SYSTEMS:  4 point ROS including Respiratory, CV, GI and , other than that noted in the HPI,  is negative    Physical Exam:  /67   Pulse 59   Temp 97.7  F (36.5  C)   Resp 16   Wt 80.6 kg (177 lb 9.6 oz)   SpO2 96%   BMI 25.48 kg/m     GENERAL APPEARANCE:  Alert, in no distress  ENT:  Mouth and posterior oropharynx normal, moist mucous membranes, hearing acuity [place]  EYES:  EOM, conjunctivae, lids, pupils and irises normal  NECK:  No  adenopathy,masses or thyromegaly  RESP:  respiratory effort and palpation of chest normal, no respiratory distress, Lung sounds clear  CV:  Palpation and auscultation of heart done , rate and rhythm reg, no murmur, no rub or gallop, Edema none  ABDOMEN:  normal bowel sounds, soft,no hepatosplenomegaly or other masses  M/S:   Gait and station not observed. , Digits and nails normal   SKIN:  Inspection/Palpation of skin and subcutaneous tissue no rash  NEURO: 2-12 in normal limits and at patient's baseline  PSYCH:  insight and judgement, memory mildly impaired , affect and mood normal      Recent Labs:  CBC RESULTS:   Recent Labs   Lab Test 12/06/18  0745 11/30/18  0700   WBC 10.6 13.7*   RBC 3.15* 3.47*   HGB 9.8* 10.7*   HCT 30.1* 33.3*   MCV 96 96   MCH 31.1 30.8   MCHC 32.6 32.1   RDW 14.0 13.8   * 438       Last Basic Metabolic Panel:  Recent Labs   Lab Test 12/06/18  0745 11/29/18  0840    143   POTASSIUM 4.7 4.3   CHLORIDE 113* 112*   AGNIESZKA 8.3* 8.3*   CO2 23 26   BUN 51* 53*   CR 2.08* 2.20*   GLC 98 108*       Assessment/Plan:  (R33.9) Urinary retention  Comment: abdi in place.    Plan: remove abdi catheter. And monitor PVR.     (S72.174D) Closed nondisplaced intertrochanteric fracture of left femur with routine healing, subsequent encounter  (primary encounter diagnosis)  Comment: recent hospitalization due to left hip fracture. S/p ORIF. He is working with therapy but only walking about 26' with walker. He can transfer ok.   Plan: physical therapy. He will need a wheelchair for home.     (I10) Essential hypertension  Comment: adequate control  Plan: no change        (N18.4) CKD (chronic kidney disease) stage 4, GFR 15-29 ml/min (H)  Comment: at baseline  Plan: avoid nephrotoxins    (I63.9) Cerebrovascular accident (CVA), unspecified mechanism (H)  Comment: cognitive impairment and mobility limitations.   Plan: physical therapy and OCCUPATIONAL THERAPY     (R53.81) Physical  deconditioning  Comment: due to recent fall with hip fracture. He is walking about 20-50' with therapy. TUG 68.5sec. He is high fall risk  Plan: physical therapy and OCCUPATIONAL THERAPY         Electronically signed by  JUAN PABLO Moreland CNP        Face to Face and Medical Necessity Statement for DME Provider visit    Demographic Information on Sourav Fine:  Gender: male  : 11/15/1925  [address]   [place] [address]  [phone] (home)     Medical Record: [id number]  Social Security Number: [id number]  Primary Care Provider: Nick Finney  Insurance: Payor: MEDICA / Plan: MEDICA PRIME SOLUTION / Product Type: Indemnity /     HPI:   Sourav Fine is a 89 year old  (11/15/1925), who is being seen today for a face to face provider visit at [facility]; medical necessity statement for DME included. This patient requires the following:  DME Ordered and Medical Necessity Statement   Mechanical Wheelchair  Size: 18 x 18  Corresponding cushion: Yes: seat and back  Standard foot rests: Yes  Elevating leg rests: No  Arm rests: Yes:   Lap tray: No  Dose patient use oxygen? No   Able to propel w/c? Yes    Mobility related ADL that are affected in the home:  Dressing, toileting, grooming and hygeine  The wheelchair is suitable and necessary for use in the patient's home.  Reason why a cane or walker will not meet the patient's needs: poor balance and limited distances due to fatigue  The patient has expressed willingness to use the wheelchair in the home and does have the physical and cognitive ability to maneuver the equipment or has a caregiver who is available, willing, and able to provide assistance in the home with the wheelchair.   Patients functional mobility deficit can be sufficiently resolved by the use of the above wheelchair        Pt needing above DME with expected length of need of 99   months  due to medical necessity associated with following diagnosis:     Urinary retention  Closed  nondisplaced intertrochanteric fracture of left femur with routine healing, subsequent encounter  Essential hypertension  CKD (chronic kidney disease) stage 4, GFR 15-29 ml/min (H)  Cerebrovascular accident (CVA), unspecified mechanism (H)  Physical deconditioning      PMH   has a past medical history of Arthritis and Hypertension.    ROS:4 point ROS including Respiratory, CV, GI and , other than that noted in the HPI,  is negative    EXAM  Vitals: /67   Pulse 59   Temp 97.7  F (36.5  C)   Resp 16   Wt 80.6 kg (177 lb 9.6 oz)   SpO2 96%   BMI 25.48 kg/m   ;BMI= Body mass index is 25.48 kg/m .  GENERAL APPEARANCE:  Alert, in no distress  ENT:  Mouth and posterior oropharynx normal, moist mucous membranes, hearing acuity [place]  EYES:  EOM, conjunctivae, lids, pupils and irises normal  NECK:  No adenopathy,masses or thyromegaly  RESP:  respiratory effort and palpation of chest normal, no respiratory distress, Lung sounds clear  CV:  Palpation and auscultation of heart done , rate and rhythm reg, no murmur, no rub or gallop, Edema none  ABDOMEN:  normal bowel sounds, soft,no hepatosplenomegaly or other masses  M/S:   Gait and station not observed. , Digits and nails normal   SKIN:  Inspection/Palpation of skin and subcutaneous tissue no rash  NEURO: 2-12 in normal limits and at patient's baseline  PSYCH:  insight and judgement, memory mildly impaired , affect and mood normal    ASSESSMENT/PLAN:  1. Urinary retention    2. Closed nondisplaced intertrochanteric fracture of left femur with routine healing, subsequent encounter    3. Essential hypertension    4. CKD (chronic kidney disease) stage 4, GFR 15-29 ml/min (H)    5. Cerebrovascular accident (CVA), unspecified mechanism (H)    6. Physical deconditioning        Orders:  1. One mechanical wheelchair 18x18 with seat and back cushion. Standard foot rests    ELECTRONICALLY SIGNED BY JEAN CLAUDE CERTIFIED PROVIDER:  JUAN PABLO Moreland CNP   NPI:  [id number]  [facility]  [address] [place] [address], [address]  [place] [address]          Sincerely,        JUAN PABLO Moreland CNP

## 2018-12-18 NOTE — PROGRESS NOTES
[facility]    Chief Complaint   Patient presents with     STORMY       [facility] Medical Record Number:  [id number]  Place of Service where encounter took place:  [facility] [[id number]]    HPI:    Sourav Fine is a 89 year old  (11/15/1925), who is being seen today for an episodic care visit.  HPI information obtained from: facility chart records, facility staff, patient report and [facility] chart review.Today's concern is:  Urinary retention  Patient came to TCU with abdi catheter. He did attend urology appointment on 12/11.  Abdi was removed He was prescribed a few days of cephelexin.   On 12/13 he c/o lower abd pain. Patient found to have 1000cc in bladder. Abdi replaced. He is on flomax. He is discharging soon.   Closed nondisplaced intertrochanteric fracture of left femur with routine healing, subsequent encounter  Patient transferred to TCU from hospital following repair of left intertrochanteric femur fracture on 11/19. Surgery was uneventful. injury happened due to fall. Activity is WBAT. DVT prophylaxis is ASA. He has been working with therapy at TCU.     Essential hypertension  BP's in TCU: 123/67, 128/59, 121/60    CKD (chronic kidney disease) stage 4, GFR 15-29 ml/min (H)  H/o nephrectomy. Baseline creat 2.1-2.2  Lab Results   Component Value Date    CR 2.08 12/06/2018       Peripheral neuropathy- he continues on gabapentin at .   Cerebrovascular accident (CVA), unspecified mechanism (H)  S/p left frontal lobe infarction in 5/2018    Physical deconditioning  Lives with wife in 3rd floor apartment. Prior to hospital stay he was independent with mobility. Therapy reports he is walking 26' with walker. CPT 4.5/5.6 and slums 17/30. Plans are being made for discharge later in week.          ALLERGIES: Patient has no known allergies.  Past Medical, Surgical, Family and Social History reviewed and updated in [facility].    Current Outpatient Medications   Medication  Sig Dispense Refill     ACETAMINOPHEN PO Take 650 mg by mouth every 6 hours as needed For 1 week        amLODIPine (NORVASC) 2.5 MG tablet Take 1 tablet (2.5 mg) by mouth daily       aspirin 325 MG EC tablet Take 1 tablet (325 mg) by mouth daily 45 tablet 0     cyanocobalamin (VITAMIN  B-12) 1000 MCG tablet Take 1,000 mcg by mouth daily        gabapentin (NEURONTIN) 300 MG capsule Take 1,500 mg by mouth At Bedtime        loperamide (IMODIUM) 2 MG capsule Take 2 mg by mouth 2 times daily as needed for diarrhea       melatonin 5 MG tablet Take 5 mg by mouth nightly as needed for sleep       Multiple Vitamins-Minerals (PRESERVISION AREDS) TABS Take 1 tablet by mouth 2 times daily        OXYCODONE HCL PO Take 2.5 mg by mouth every 8 hours as needed       senna-docusate (SENOKOT-S/PERICOLACE) 8.6-50 MG tablet Take 2 tablets by mouth 2 times daily as needed for constipation       tamsulosin (FLOMAX) 0.4 MG capsule Take 1 capsule (0.4 mg) by mouth daily 60 capsule      Medications reviewed:  Medications reconciled to facility chart and changes were made to reflect current medications as identified as above med list. Below are the changes that were made:   Medications stopped since last EPIC medication reconciliation:   There are no discontinued medications.    Medications started since last [facility] medication reconciliation:  No orders of the defined types were placed in this encounter.      REVIEW OF SYSTEMS:  4 point ROS including Respiratory, CV, GI and , other than that noted in the HPI,  is negative    Physical Exam:  /67   Pulse 59   Temp 97.7  F (36.5  C)   Resp 16   Wt 80.6 kg (177 lb 9.6 oz)   SpO2 96%   BMI 25.48 kg/m    GENERAL APPEARANCE:  Alert, in no distress  ENT:  Mouth and posterior oropharynx normal, moist mucous membranes, hearing acuity [place]  EYES:  EOM, conjunctivae, lids, pupils and irises normal  NECK:  No adenopathy,masses or thyromegaly  RESP:  respiratory effort and palpation of chest  normal, no respiratory distress, Lung sounds clear  CV:  Palpation and auscultation of heart done , rate and rhythm reg, no murmur, no rub or gallop, Edema none  ABDOMEN:  normal bowel sounds, soft,no hepatosplenomegaly or other masses  M/S:   Gait and station not observed. , Digits and nails normal   SKIN:  Inspection/Palpation of skin and subcutaneous tissue no rash  NEURO: 2-12 in normal limits and at patient's baseline  PSYCH:  insight and judgement, memory mildly impaired , affect and mood normal      Recent Labs:  CBC RESULTS:   Recent Labs   Lab Test 12/06/18  0745 11/30/18  0700   WBC 10.6 13.7*   RBC 3.15* 3.47*   HGB 9.8* 10.7*   HCT 30.1* 33.3*   MCV 96 96   MCH 31.1 30.8   MCHC 32.6 32.1   RDW 14.0 13.8   * 438       Last Basic Metabolic Panel:  Recent Labs   Lab Test 12/06/18  0745 11/29/18  0840    143   POTASSIUM 4.7 4.3   CHLORIDE 113* 112*   AGNIESZKA 8.3* 8.3*   CO2 23 26   BUN 51* 53*   CR 2.08* 2.20*   GLC 98 108*       Assessment/Plan:  (R33.9) Urinary retention  Comment: abdi in place.    Plan: remove abdi catheter. And monitor PVR.     (S72.860D) Closed nondisplaced intertrochanteric fracture of left femur with routine healing, subsequent encounter  (primary encounter diagnosis)  Comment: recent hospitalization due to left hip fracture. S/p ORIF. He is working with therapy but only walking about 26' with walker. He can transfer ok.   Plan: physical therapy. He will need a wheelchair for home.     (I10) Essential hypertension  Comment: adequate control  Plan: no change        (N18.4) CKD (chronic kidney disease) stage 4, GFR 15-29 ml/min (H)  Comment: at baseline  Plan: avoid nephrotoxins    (I63.9) Cerebrovascular accident (CVA), unspecified mechanism (H)  Comment: cognitive impairment and mobility limitations.   Plan: physical therapy and OCCUPATIONAL THERAPY     (R53.81) Physical deconditioning  Comment: due to recent fall with hip fracture. He is walking about 20-50' with therapy.  TUG 68.5sec. He is high fall risk  Plan: physical therapy and OCCUPATIONAL THERAPY         Electronically signed by  JUAN PABLO Moreland CNP        Face to Face and Medical Necessity Statement for DME Provider visit    Demographic Information on Sourav Fine:  Gender: male  : 11/15/1925  [address]   [place] [address]  [phone] (home)     Medical Record: [id number]  Social Security Number: [id number]  Primary Care Provider: Nick Finney  Insurance: Payor: MEDICA / Plan: MEDICA PRIME SOLUTION / Product Type: Indemnity /     HPI:   Sourav Fine is a 89 year old  (11/15/1925), who is being seen today for a face to face provider visit at [facility]; medical necessity statement for DME included. This patient requires the following:  DME Ordered and Medical Necessity Statement   Mechanical Wheelchair  Size: 18 x 18  Corresponding cushion: Yes: seat and back  Standard foot rests: Yes  Elevating leg rests: No  Arm rests: Yes:   Lap tray: No  Dose patient use oxygen? No   Able to propel w/c? Yes    Mobility related ADL that are affected in the home:  Dressing, toileting, grooming and hygeine  The wheelchair is suitable and necessary for use in the patient's home.  Reason why a cane or walker will not meet the patient's needs: poor balance and limited distances due to fatigue  The patient has expressed willingness to use the wheelchair in the home and does have the physical and cognitive ability to maneuver the equipment or has a caregiver who is available, willing, and able to provide assistance in the home with the wheelchair.   Patients functional mobility deficit can be sufficiently resolved by the use of the above wheelchair        Pt needing above DME with expected length of need of 99   months  due to medical necessity associated with following diagnosis:     Urinary retention  Closed nondisplaced intertrochanteric fracture of left femur with routine healing, subsequent encounter  Essential  hypertension  CKD (chronic kidney disease) stage 4, GFR 15-29 ml/min (H)  Cerebrovascular accident (CVA), unspecified mechanism (H)  Physical deconditioning      PMH   has a past medical history of Arthritis and Hypertension.    ROS:4 point ROS including Respiratory, CV, GI and , other than that noted in the HPI,  is negative    EXAM  Vitals: /67   Pulse 59   Temp 97.7  F (36.5  C)   Resp 16   Wt 80.6 kg (177 lb 9.6 oz)   SpO2 96%   BMI 25.48 kg/m  ;BMI= Body mass index is 25.48 kg/m .  GENERAL APPEARANCE:  Alert, in no distress  ENT:  Mouth and posterior oropharynx normal, moist mucous membranes, hearing acuity [place]  EYES:  EOM, conjunctivae, lids, pupils and irises normal  NECK:  No adenopathy,masses or thyromegaly  RESP:  respiratory effort and palpation of chest normal, no respiratory distress, Lung sounds clear  CV:  Palpation and auscultation of heart done , rate and rhythm reg, no murmur, no rub or gallop, Edema none  ABDOMEN:  normal bowel sounds, soft,no hepatosplenomegaly or other masses  M/S:   Gait and station not observed. , Digits and nails normal   SKIN:  Inspection/Palpation of skin and subcutaneous tissue no rash  NEURO: 2-12 in normal limits and at patient's baseline  PSYCH:  insight and judgement, memory mildly impaired , affect and mood normal    ASSESSMENT/PLAN:  1. Urinary retention    2. Closed nondisplaced intertrochanteric fracture of left femur with routine healing, subsequent encounter    3. Essential hypertension    4. CKD (chronic kidney disease) stage 4, GFR 15-29 ml/min (H)    5. Cerebrovascular accident (CVA), unspecified mechanism (H)    6. Physical deconditioning        Orders:  1. One mechanical wheelchair 18x18 with seat and back cushion. Standard foot rests    ELECTRONICALLY SIGNED BY [place] CERTIFIED PROVIDER:  JUAN PABLO Moreland CNP   NPI: [id number]  [facility]  [address] [place] [address], [address]  [place] [address]

## 2018-12-19 ENCOUNTER — HOSPITAL ENCOUNTER (OUTPATIENT)
Dept: MRI IMAGING | Facility: CLINIC | Age: 83
Discharge: HOME | End: 2018-12-19
Attending: PHYSICIAN ASSISTANT | Admitting: PHYSICIAN ASSISTANT
Payer: MEDICARE

## 2018-12-19 ENCOUNTER — DISCHARGE SUMMARY NURSING HOME (OUTPATIENT)
Dept: GERIATRICS | Facility: CLINIC | Age: 83
End: 2018-12-19
Payer: COMMERCIAL

## 2018-12-19 ENCOUNTER — HOSPITAL LABORATORY (OUTPATIENT)
Dept: OTHER | Facility: CLINIC | Age: 83
End: 2018-12-19

## 2018-12-19 VITALS
OXYGEN SATURATION: 96 % | TEMPERATURE: 98.3 F | RESPIRATION RATE: 16 BRPM | WEIGHT: 177.6 LBS | HEART RATE: 67 BPM | DIASTOLIC BLOOD PRESSURE: 67 MMHG | SYSTOLIC BLOOD PRESSURE: 124 MMHG | BODY MASS INDEX: 25.48 KG/M2

## 2018-12-19 DIAGNOSIS — R33.9 URINARY RETENTION: Primary | ICD-10-CM

## 2018-12-19 DIAGNOSIS — R53.81 PHYSICAL DECONDITIONING: ICD-10-CM

## 2018-12-19 DIAGNOSIS — S72.145D CLOSED NONDISPLACED INTERTROCHANTERIC FRACTURE OF LEFT FEMUR WITH ROUTINE HEALING, SUBSEQUENT ENCOUNTER: ICD-10-CM

## 2018-12-19 DIAGNOSIS — I63.9 CEREBROVASCULAR ACCIDENT (CVA), UNSPECIFIED MECHANISM (H): ICD-10-CM

## 2018-12-19 DIAGNOSIS — N18.4 CKD (CHRONIC KIDNEY DISEASE) STAGE 4, GFR 15-29 ML/MIN (H): ICD-10-CM

## 2018-12-19 DIAGNOSIS — M79.605 PAIN OF LEFT LOWER EXTREMITY: ICD-10-CM

## 2018-12-19 DIAGNOSIS — I10 ESSENTIAL HYPERTENSION: ICD-10-CM

## 2018-12-19 LAB
ANION GAP SERPL CALCULATED.3IONS-SCNC: 6 MMOL/L (ref 3–14)
BUN SERPL-MCNC: 50 MG/DL (ref 7–30)
CALCIUM SERPL-MCNC: 8.4 MG/DL (ref 8.5–10.1)
CHLORIDE SERPL-SCNC: 113 MMOL/L (ref 94–109)
CO2 SERPL-SCNC: 26 MMOL/L (ref 20–32)
CREAT SERPL-MCNC: 2.16 MG/DL (ref 0.66–1.25)
ERYTHROCYTE [DISTWIDTH] IN BLOOD BY AUTOMATED COUNT: 13.4 % (ref 10–15)
GFR SERPL CREATININE-BSD FRML MDRD: 25 ML/MIN/{1.73_M2}
GLUCOSE SERPL-MCNC: 100 MG/DL (ref 70–99)
HCT VFR BLD AUTO: 30.2 % (ref 40–53)
HGB BLD-MCNC: 10 G/DL (ref 13.3–17.7)
MCH RBC QN AUTO: 31.4 PG (ref 26.5–33)
MCHC RBC AUTO-ENTMCNC: 33.1 G/DL (ref 31.5–36.5)
MCV RBC AUTO: 95 FL (ref 78–100)
PLATELET # BLD AUTO: 303 10E9/L (ref 150–450)
POTASSIUM SERPL-SCNC: 4.5 MMOL/L (ref 3.4–5.3)
RBC # BLD AUTO: 3.18 10E12/L (ref 4.4–5.9)
SODIUM SERPL-SCNC: 145 MMOL/L (ref 133–144)
WBC # BLD AUTO: 9.2 10E9/L (ref 4–11)

## 2018-12-19 PROCEDURE — 99316 NF DSCHRG MGMT 30 MIN+: CPT | Performed by: NURSE PRACTITIONER

## 2018-12-19 PROCEDURE — 72148 MRI LUMBAR SPINE W/O DYE: CPT

## 2018-12-19 RX ORDER — TAMSULOSIN HYDROCHLORIDE 0.4 MG/1
0.8 CAPSULE ORAL DAILY
COMMUNITY
End: 2018-12-21

## 2018-12-19 NOTE — PROGRESS NOTES
[facility] DISCHARGE SUMMARY    PATIENT'S NAME: Sourav Fine  YOB: 1925  MEDICAL RECORD NUMBER:  [id number]  Place of Service where encounter took place:  [facility] [[id number]]    PRIMARY CARE PROVIDER AND CLINIC RESPONSIBLE AFTER TRANSFER: Nick Finney [facility] [address] / [place] [address]     TRANSFERRING PROVIDERS: JUAN PABLO Moreland CNP, Adia Kelley MD  DATE OF SNF ADMISSION:  November / 23 / 2018  DATE OF SNF (anticipated) DISCHARGE: December / 21 / 2018  DISCHARGE DISPOSITION: Non-[facility] Provider   RECENT HOSPITALIZATION/ED:  [facility] stay 11/19/2018 to 11/23/2018.     CODE STATUS/ADVANCE DIRECTIVES DISCUSSION:   DNR only   No Known Allergies  Condition on Discharge:  Improving.  Function:  Walking up to 10' with assist  Cognitive Scores: SLUMS 17/30 and CPT 4.5/5.6    Equipment: walker    DISCHARGE DIAGNOSIS:   1. Urinary retention    2. Closed nondisplaced intertrochanteric fracture of left femur with routine healing, subsequent encounter    3. Essential hypertension    4. CKD (chronic kidney disease) stage 4, GFR 15-29 ml/min (H)    5. Peripheral neuropathy    6. Cerebrovascular accident (CVA), unspecified mechanism (H)    7. Physical deconditioning        HPI Nursing Facility Course:   HPI information obtained from: facility chart records, facility staff, patient report and [facility] chart review.    Closed nondisplaced intertrochanteric fracture of left femur with routine healing, subsequent encounter  Patient transferred to TCU from hospital following repair of left intertrochanteric femur fracture on 11/19. Surgery was uneventful. injury happened due to fall. Activity is WBAT. DVT prophylaxis is ASA. He has been working with therapy at TCU. He is not walking household distances but family insists on taking him home.   Urinary retention  Patient came to TCU with abdi catheter. He did attend  urology appointment on 12/11.  Abdi was removed He was prescribed a few days of cephelexin.   On 12/13 he c/o lower abd pain. Patient found to have 1000cc in bladder. Abdi replaced. He is on flomax. He is discharging soon. We did remove the catheter again on 12/17. So far he has required str cath. flomax was increased to 0.8mg. If he continues to retain urine we will need to replace abdi prior to discharge.   Essential hypertension  BP's in TCU: 124/67, 154/77, 123/67    CKD (chronic kidney disease) stage 4, GFR 15-29 ml/min (H)  H/o nephrectomy. Baseline creat 2.1-2.2  Lab Results   Component Value Date    CR 2.08 12/06/2018       Peripheral neuropathy- he continues on gabapentin at .   Cerebrovascular accident (CVA), unspecified mechanism (H)  S/p left frontal lobe infarction in 5/2018    Physical deconditioning  Lives with wife in 3rd floor apartment. Prior to hospital stay he was independent with mobility. Therapy reports he is walking 26' with walker. CPT 4.5/5.6 and slums 17/30. Therapy would prefer to continue to work with him but family is requesting that he return home.   PAST MEDICAL HISTORY:  has a past medical history of Arthritis and Hypertension.    DISCHARGE MEDICATIONS:  Current Outpatient Medications   Medication Sig Dispense Refill     ACETAMINOPHEN PO Take 650 mg by mouth every 6 hours as needed For 1 week        amLODIPine (NORVASC) 2.5 MG tablet Take 1 tablet (2.5 mg) by mouth daily       aspirin 325 MG EC tablet Take 1 tablet (325 mg) by mouth daily 45 tablet 0     cyanocobalamin (VITAMIN  B-12) 1000 MCG tablet Take 1,000 mcg by mouth daily        gabapentin (NEURONTIN) 300 MG capsule Take 1,500 mg by mouth At Bedtime        loperamide (IMODIUM) 2 MG capsule Take 2 mg by mouth 2 times daily as needed for diarrhea       melatonin 5 MG tablet Take 5 mg by mouth nightly as needed for sleep       Multiple Vitamins-Minerals (PRESERVISION AREDS) TABS Take 1 tablet by mouth 2 times daily         senna-docusate (SENOKOT-S/PERICOLACE) 8.6-50 MG tablet Take 2 tablets by mouth 2 times daily as needed for constipation       tamsulosin (FLOMAX) 0.4 MG capsule Take 0.8 mg by mouth daily         MEDICATION CHANGES/RATIONALE:   12/18/18 increase flomax 0.8mg q day  Controlled medications sent with patient:   not applicable/none     ROS:    4 point ROS including Respiratory, CV, GI and , other than that noted in the HPI,  is negative    Physical Exam:   Vitals: /67   Pulse 67   Temp 98.3  F (36.8  C)   Resp 16   Wt 80.6 kg (177 lb 9.6 oz)   SpO2 96%   BMI 25.48 kg/m    BMI= Body mass index is 25.48 kg/m .    GENERAL APPEARANCE:  Alert, in no distress  ENT:  Mouth and posterior oropharynx normal, moist mucous membranes, hearing acuity [place]  EYES:  EOM, conjunctivae, lids, pupils and irises normal  NECK:  No adenopathy,masses or thyromegaly  RESP:  respiratory effort and palpation of chest normal, no respiratory distress, Lung sounds clear  CV:  Palpation and auscultation of heart done , rate and rhythm reg, no murmur, no rub or gallop, Edema none  ABDOMEN:  normal bowel sounds, soft,no hepatosplenomegaly or other masses  M/S:   Gait and station not observed. , Digits and nails normal   SKIN:  Inspection/Palpation of skin and subcutaneous tissue no rash  NEURO: 2-12 in normal limits and at patient's baseline  PSYCH:  insight and judgement, memory mildly impaired , affect and mood normal      DISCHARGE PLAN:  Occupational Therapy, Physical Therapy, Registered Nurse, Home Health Aide and From:  [facility]  Patient instructed to follow-up with:  PCP in 7 days      Current [place] scheduled appointments:  Future Appointments   Date Time Provider Department Center   12/19/2018  1:45 PM [facility]       MTLUIS EDUARDO referral needed and placed by this provider: No    Pending labs: none  SNF labs   CBC RESULTS:   Recent Labs   Lab Test 12/19/18  0525 12/06/18  0745   WBC 9.2 10.6   RBC 3.18* 3.15*    HGB 10.0* 9.8*   HCT 30.2* 30.1*   MCV 95 96   MCH 31.4 31.1   MCHC 33.1 32.6   RDW 13.4 14.0    489*       Last Basic Metabolic Panel:  Recent Labs   Lab Test 12/19/18  0525 12/06/18  0745   * 143   POTASSIUM 4.5 4.7   CHLORIDE 113* 113*   AGNIESZKA 8.4* 8.3*   CO2 26 23   BUN 50* 51*   CR 2.16* 2.08*   * 98       Discharge Treatments:none    TOTAL DISCHARGE TIME:   Greater than 30 minutes  Electronically signed by:  JUAN PABLO Moreland CNP

## 2018-12-19 NOTE — LETTER
12/19/2018        RE: Sourav Fine  [address]  [place] [address]          [facility] DISCHARGE SUMMARY    PATIENT'S NAME: Sourav Fine  YOB: 1925  MEDICAL RECORD NUMBER:  [id number]  Place of Service where encounter took place:  [facility] [[id number]]    PRIMARY CARE PROVIDER AND CLINIC RESPONSIBLE AFTER TRANSFER: [facility] [address] / [place] [address]     TRANSFERRING PROVIDERS: JUAN PABLO Moreland CNP, Adia Kelley MD  DATE OF SNF ADMISSION:  November / 23 / 2018  DATE OF SNF (anticipated) DISCHARGE: December / 21 / 2018  DISCHARGE DISPOSITION: Non-[facility] Provider   RECENT HOSPITALIZATION/ED:  [facility] stay 11/19/2018 to 11/23/2018.     CODE STATUS/ADVANCE DIRECTIVES DISCUSSION:   DNR only   No Known Allergies  Condition on Discharge:  Improving.  Function:  Walking up to 10' with assist  Cognitive Scores: SLUMS 17/30 and CPT 4.5/5.6    Equipment: walker    DISCHARGE DIAGNOSIS:   1. Urinary retention    2. Closed nondisplaced intertrochanteric fracture of left femur with routine healing, subsequent encounter    3. Essential hypertension    4. CKD (chronic kidney disease) stage 4, GFR 15-29 ml/min (H)    5. Peripheral neuropathy    6. Cerebrovascular accident (CVA), unspecified mechanism (H)    7. Physical deconditioning        HPI Nursing Facility Course:   HPI information obtained from: facility chart records, facility staff, patient report and [facility] chart review.  Urinary retention  Patient came to TCU with abdi catheter. He did attend urology appointment on 12/11.  Abdi was removed He was prescribed a few days of cephelexin.   On 12/13 he c/o lower abd pain. Patient found to have 1000cc in bladder. Abdi replaced. He is on flomax. He is discharging soon.   Closed nondisplaced intertrochanteric fracture of left femur with routine healing, subsequent encounter  Patient  transferred to TCU from hospital following repair of left intertrochanteric femur fracture on 11/19. Surgery was uneventful. injury happened due to fall. Activity is WBAT. DVT prophylaxis is ASA. He has been working with therapy at TCU.     Essential hypertension  BP's in TCU: 124/67, 154/77, 123/67    CKD (chronic kidney disease) stage 4, GFR 15-29 ml/min (H)  H/o nephrectomy. Baseline creat 2.1-2.2  Lab Results   Component Value Date    CR 2.08 12/06/2018       Peripheral neuropathy- he continues on gabapentin at .   Cerebrovascular accident (CVA), unspecified mechanism (H)  S/p left frontal lobe infarction in 5/2018    Physical deconditioning  Lives with wife in 3rd floor apartment. Prior to hospital stay he was independent with mobility. Therapy reports he is walking 26' with walker. CPT 4.5/5.6 and slums 17/30. Plans are being made for discharge later in week.      PAST MEDICAL HISTORY:  has a past medical history of Arthritis and Hypertension.    DISCHARGE MEDICATIONS:  Current Outpatient Medications   Medication Sig Dispense Refill     ACETAMINOPHEN PO Take 650 mg by mouth every 6 hours as needed For 1 week        amLODIPine (NORVASC) 2.5 MG tablet Take 1 tablet (2.5 mg) by mouth daily       aspirin 325 MG EC tablet Take 1 tablet (325 mg) by mouth daily 45 tablet 0     cyanocobalamin (VITAMIN  B-12) 1000 MCG tablet Take 1,000 mcg by mouth daily        gabapentin (NEURONTIN) 300 MG capsule Take 1,500 mg by mouth At Bedtime        loperamide (IMODIUM) 2 MG capsule Take 2 mg by mouth 2 times daily as needed for diarrhea       melatonin 5 MG tablet Take 5 mg by mouth nightly as needed for sleep       Multiple Vitamins-Minerals (PRESERVISION AREDS) TABS Take 1 tablet by mouth 2 times daily        senna-docusate (SENOKOT-S/PERICOLACE) 8.6-50 MG tablet Take 2 tablets by mouth 2 times daily as needed for constipation       tamsulosin (FLOMAX) 0.4 MG capsule Take 0.8 mg by mouth daily         MEDICATION  CHANGES/RATIONALE:   12/18/18 increase flomax 0.8mg q day  Controlled medications sent with patient:   not applicable/none     ROS:    4 point ROS including Respiratory, CV, GI and , other than that noted in the HPI,  is negative    Physical Exam:   Vitals: /67   Pulse 67   Temp 98.3  F (36.8  C)   Resp 16   Wt 80.6 kg (177 lb 9.6 oz)   SpO2 96%   BMI 25.48 kg/m     BMI= Body mass index is 25.48 kg/m .    GENERAL APPEARANCE:  Alert, in no distress  ENT:  Mouth and posterior oropharynx normal, moist mucous membranes, hearing acuity [place]  EYES:  EOM, conjunctivae, lids, pupils and irises normal  NECK:  No adenopathy,masses or thyromegaly  RESP:  respiratory effort and palpation of chest normal, no respiratory distress, Lung sounds clear  CV:  Palpation and auscultation of heart done , rate and rhythm reg, no murmur, no rub or gallop, Edema none  ABDOMEN:  normal bowel sounds, soft,no hepatosplenomegaly or other masses  M/S:   Gait and station not observed. , Digits and nails normal   SKIN:  Inspection/Palpation of skin and subcutaneous tissue no rash  NEURO: 2-12 in normal limits and at patient's baseline  PSYCH:  insight and judgement, memory mildly impaired , affect and mood normal      DISCHARGE PLAN:  Occupational Therapy, Physical Therapy, Registered Nurse, Home Health Aide and From:  [facility]  Patient instructed to follow-up with:  PCP in 7 days      Current [facility] scheduled appointments:  Future Appointments   Date Time Provider Department Center   12/19/2018  1:45 PM [facility]       [facility] referral needed and placed by this provider: No    Pending labs: none  SNF labs   CBC RESULTS:   Recent Labs   Lab Test 12/19/18  0525 12/06/18  0745   WBC 9.2 10.6   RBC 3.18* 3.15*   HGB 10.0* 9.8*   HCT 30.2* 30.1*   MCV 95 96   MCH 31.4 31.1   MCHC 33.1 32.6   RDW 13.4 14.0    489*       Last Basic Metabolic Panel:  Recent Labs   Lab Test 12/19/18  0525 12/06/18  0745   NA  145* 143   POTASSIUM 4.5 4.7   CHLORIDE 113* 113*   AGNIESZKA 8.4* 8.3*   CO2 26 23   BUN 50* 51*   CR 2.16* 2.08*   * 98       Discharge Treatments:none    TOTAL DISCHARGE TIME:   Greater than 30 minutes  Electronically signed by:  JUAN PABLO Moreland CNP      [facility] DISCHARGE SUMMARY    PATIENT'S NAME: Sourav Fine  YOB: 1925  MEDICAL RECORD NUMBER:  [id number]  [facility] of [facility] where encounter took place[facility] [[id number]]    PRIMARY CARE PROVIDER AND CLINIC RESPONSIBLE AFTER TRANSFER: [place] [employer] [address] / [place] [address]     TRANSFERRING PROVIDERS: JUAN PABLO Moreland CNP, Adia Kelley MD  DATE OF SNF ADMISSION:  November / 23 / 2018  DATE OF SNF (anticipated) DISCHARGE: December / 21 / 2018  DISCHARGE DISPOSITION: Non-[facility] Provider   RECENT HOSPITALIZATION/ED:  [facility] stay 11/19/2018 to 11/23/2018.     CODE STATUS/ADVANCE DIRECTIVES DISCUSSION:   DNR only   No Known Allergies  Condition on Discharge:  Improving.  Function:  Walking up to 10' with assist  Cognitive Scores: SLUMS 17/30 and CPT 4.5/5.6    Equipment: walker    DISCHARGE DIAGNOSIS:   1. Urinary retention    2. Closed nondisplaced intertrochanteric fracture of left femur with routine healing, subsequent encounter    3. Essential hypertension    4. CKD (chronic kidney disease) stage 4, GFR 15-29 ml/min (H)    5. Peripheral neuropathy    6. Cerebrovascular accident (CVA), unspecified mechanism (H)    7. Physical deconditioning        HPI Nursing Facility Course:   HPI information obtained from: facility chart records, facility staff, patient report and [facility] chart review.    Closed nondisplaced intertrochanteric fracture of left femur with routine healing, subsequent encounter  Patient transferred to TCU from hospital following repair of left intertrochanteric femur fracture on 11/19. Surgery was uneventful.  injury happened due to fall. Activity is WBAT. DVT prophylaxis is ASA. He has been working with therapy at TCU. He is not walking household distances but family insists on taking him home.   Urinary retention  Patient came to TCU with abdi catheter. He did attend urology appointment on 12/11.  Abdi was removed He was prescribed a few days of cephelexin.   On 12/13 he c/o lower abd pain. Patient found to have 1000cc in bladder. Abdi replaced. He is on flomax. He is discharging soon. We did remove the catheter again on 12/17. So far he has required str cath. flomax was increased to 0.8mg. If he continues to retain urine we will need to replace abdi prior to discharge.   Essential hypertension  BP's in TCU: 124/67, 154/77, 123/67    CKD (chronic kidney disease) stage 4, GFR 15-29 ml/min (H)  H/o nephrectomy. Baseline creat 2.1-2.2  Lab Results   Component Value Date    CR 2.08 12/06/2018       Peripheral neuropathy- he continues on gabapentin at .   Cerebrovascular accident (CVA), unspecified mechanism (H)  S/p left frontal lobe infarction in 5/2018    Physical deconditioning  Lives with wife in 3rd floor apartment. Prior to hospital stay he was independent with mobility. Therapy reports he is walking 26' with walker. CPT 4.5/5.6 and slums 17/30. Therapy would prefer to continue to work with him but family is requesting that he return home.   PAST MEDICAL HISTORY:  has a past medical history of Arthritis and Hypertension.    DISCHARGE MEDICATIONS:  Current Outpatient Medications   Medication Sig Dispense Refill     ACETAMINOPHEN PO Take 650 mg by mouth every 6 hours as needed For 1 week        amLODIPine (NORVASC) 2.5 MG tablet Take 1 tablet (2.5 mg) by mouth daily       aspirin 325 MG EC tablet Take 1 tablet (325 mg) by mouth daily 45 tablet 0     cyanocobalamin (VITAMIN  B-12) 1000 MCG tablet Take 1,000 mcg by mouth daily        gabapentin (NEURONTIN) 300 MG capsule Take 1,500 mg by mouth At Bedtime         loperamide (IMODIUM) 2 MG capsule Take 2 mg by mouth 2 times daily as needed for diarrhea       melatonin 5 MG tablet Take 5 mg by mouth nightly as needed for sleep       Multiple Vitamins-Minerals (PRESERVISION AREDS) TABS Take 1 tablet by mouth 2 times daily        senna-docusate (SENOKOT-S/PERICOLACE) 8.6-50 MG tablet Take 2 tablets by mouth 2 times daily as needed for constipation       tamsulosin (FLOMAX) 0.4 MG capsule Take 0.8 mg by mouth daily         MEDICATION CHANGES/RATIONALE:   12/18/18 increase flomax 0.8mg q day  Controlled medications sent with patient:   not applicable/none     ROS:    4 point ROS including Respiratory, CV, GI and , other than that noted in the HPI,  is negative    Physical Exam:   Vitals: /67   Pulse 67   Temp 98.3  F (36.8  C)   Resp 16   Wt 80.6 kg (177 lb 9.6 oz)   SpO2 96%   BMI 25.48 kg/m     BMI= Body mass index is 25.48 kg/m .    GENERAL APPEARANCE:  Alert, in no distress  ENT:  Mouth and posterior oropharynx normal, moist mucous membranes, hearing acuity [place]  EYES:  EOM, conjunctivae, lids, pupils and irises normal  NECK:  No adenopathy,masses or thyromegaly  RESP:  respiratory effort and palpation of chest normal, no respiratory distress, Lung sounds clear  CV:  Palpation and auscultation of heart done , rate and rhythm reg, no murmur, no rub or gallop, Edema none  ABDOMEN:  normal bowel sounds, soft,no hepatosplenomegaly or other masses  M/S:   Gait and station not observed. , Digits and nails normal   SKIN:  Inspection/Palpation of skin and subcutaneous tissue no rash  NEURO: 2-12 in normal limits and at patient's baseline  PSYCH:  insight and judgement, memory mildly impaired , affect and mood normal      DISCHARGE PLAN:  Occupational Therapy, Physical Therapy, Registered Nurse, Home Health Aide and From:  [facility]  Patient instructed to follow-up with:  PCP in 7 days      Current [place] scheduled appointments:  Future Appointments   Date Time  Provider Department Center   12/19/2018  1:45 PM [facility]2 [facility]       [facility] referral needed and placed by this provider: No    Pending labs: none  SNF labs   CBC RESULTS:   Recent Labs   Lab Test 12/19/18  0525 12/06/18  0745   WBC 9.2 10.6   RBC 3.18* 3.15*   HGB 10.0* 9.8*   HCT 30.2* 30.1*   MCV 95 96   MCH 31.4 31.1   MCHC 33.1 32.6   RDW 13.4 14.0    489*       Last Basic Metabolic Panel:  Recent Labs   Lab Test 12/19/18  0525 12/06/18  0745   * 143   POTASSIUM 4.5 4.7   CHLORIDE 113* 113*   AGNIESZKA 8.4* 8.3*   CO2 26 23   BUN 50* 51*   CR 2.16* 2.08*   * 98       Discharge Treatments:none    TOTAL DISCHARGE TIME:   Greater than 30 minutes  Electronically signed by:  JUAN PABLO Moreland CNP      Sincerely,        JUAN PABLO Moreland CNP

## 2018-12-20 ENCOUNTER — NURSING HOME VISIT (OUTPATIENT)
Dept: GERIATRICS | Facility: CLINIC | Age: 83
End: 2018-12-20
Payer: COMMERCIAL

## 2018-12-20 VITALS
OXYGEN SATURATION: 97 % | HEART RATE: 93 BPM | TEMPERATURE: 98.3 F | RESPIRATION RATE: 16 BRPM | SYSTOLIC BLOOD PRESSURE: 166 MMHG | DIASTOLIC BLOOD PRESSURE: 74 MMHG | WEIGHT: 177.6 LBS | BODY MASS INDEX: 25.48 KG/M2

## 2018-12-20 DIAGNOSIS — R53.81 PHYSICAL DECONDITIONING: ICD-10-CM

## 2018-12-20 DIAGNOSIS — S72.145D CLOSED NONDISPLACED INTERTROCHANTERIC FRACTURE OF LEFT FEMUR WITH ROUTINE HEALING, SUBSEQUENT ENCOUNTER: ICD-10-CM

## 2018-12-20 DIAGNOSIS — N18.4 CKD (CHRONIC KIDNEY DISEASE) STAGE 4, GFR 15-29 ML/MIN (H): ICD-10-CM

## 2018-12-20 DIAGNOSIS — I63.9 CEREBROVASCULAR ACCIDENT (CVA), UNSPECIFIED MECHANISM (H): ICD-10-CM

## 2018-12-20 DIAGNOSIS — R33.9 URINARY RETENTION: Primary | ICD-10-CM

## 2018-12-20 DIAGNOSIS — I10 ESSENTIAL HYPERTENSION: ICD-10-CM

## 2018-12-20 PROCEDURE — 99310 SBSQ NF CARE HIGH MDM 45: CPT | Performed by: NURSE PRACTITIONER

## 2018-12-20 NOTE — LETTER
12/20/2018        RE: Sourav Fine  [address]  [place] [address]        [place] [facility]    Chief Complaint   Patient presents with     RECHECK       [place] Medical Record Number:  [id number]  Place of Service where encounter took place:  [facility] [[id number]]    HPI:    Sourav Fine is a 89 year old  (11/15/1925), who is being seen today for an episodic care visit.  HPI information obtained from: facility chart records, facility staff, patient report and [facility] chart review.Today's concern is:  Urinary retention  Patient came to TCU with abdi catheter. He did attend urology appointment on 12/11.  Abdi was removed He was prescribed a few days of cephelexin.   On 12/13 he c/o lower abd pain. Patient found to have 1000cc in bladder. Abdi replaced. He is on flomax. He is discharging soon.   Closed nondisplaced intertrochanteric fracture of left femur with routine healing, subsequent encounter  Patient transferred to TCU from hospital following repair of left intertrochanteric femur fracture on 11/19. Surgery was uneventful. injury happened due to fall. Activity is WBAT. DVT prophylaxis is ASA. He has been working with therapy at TCU.     Essential hypertension  BP's in TCU: 166/74, 124/67, 154/77    CKD (chronic kidney disease) stage 4, GFR 15-29 ml/min (H)  H/o nephrectomy. Baseline creat 2.1-2.2  Lab Results   Component Value Date    CR 2.08 12/06/2018       Peripheral neuropathy- he continues on gabapentin at .   Cerebrovascular accident (CVA), unspecified mechanism (H)  S/p left frontal lobe infarction in 5/2018  OCCUPATIONAL THERAPY reports SLUMS 17/30 and CPT 4.5/5.6    Physical deconditioning  Lives with wife in 3rd floor apartment. Prior to hospital stay he was independent with mobility. Therapy reports he is walking 26' with walker. CPT 4.5/5.6 and slums 17/30. Plans are being made for discharge later in week.          ALLERGIES: Patient has no  known allergies.  Past Medical, Surgical, Family and Social History reviewed and updated in [facility].    Current Outpatient Medications   Medication Sig Dispense Refill     ACETAMINOPHEN PO Take 650 mg by mouth every 6 hours as needed        amLODIPine (NORVASC) 2.5 MG tablet Take 1 tablet (2.5 mg) by mouth daily       aspirin 325 MG EC tablet Take 1 tablet (325 mg) by mouth daily 45 tablet 0     cyanocobalamin (VITAMIN  B-12) 1000 MCG tablet Take 1,000 mcg by mouth daily        gabapentin (NEURONTIN) 300 MG capsule Take 1,500 mg by mouth At Bedtime        loperamide (IMODIUM) 2 MG capsule Take 2 mg by mouth 2 times daily as needed for diarrhea       melatonin 5 MG tablet Take 5 mg by mouth nightly as needed for sleep       Multiple Vitamins-Minerals (PRESERVISION AREDS) TABS Take 1 tablet by mouth 2 times daily        senna-docusate (SENOKOT-S/PERICOLACE) 8.6-50 MG tablet Take 2 tablets by mouth 2 times daily as needed for constipation       tamsulosin (FLOMAX) 0.4 MG capsule Take 0.8 mg by mouth daily       Medications reviewed:  Medications reconciled to facility chart and changes were made to reflect current medications as identified as above med list. Below are the changes that were made:   Medications stopped since last EPIC medication reconciliation:   There are no discontinued medications.    Medications started since last [facility] medication reconciliation:  No orders of the defined types were placed in this encounter.      REVIEW OF SYSTEMS:  4 point ROS including Respiratory, CV, GI and , other than that noted in the HPI,  is negative    Physical Exam:  /74   Pulse 93   Temp 98.3  F (36.8  C)   Resp 16   Wt 80.6 kg (177 lb 9.6 oz)   SpO2 97%   BMI 25.48 kg/m     GENERAL APPEARANCE:  Alert, in no distress  ENT:  Mouth and posterior oropharynx normal, moist mucous membranes, hearing acuity [place]  EYES:  EOM, conjunctivae, lids, pupils and irises normal  NECK:  No adenopathy,masses or thyromegaly  RESP:   respiratory effort and palpation of chest normal, no respiratory distress, Lung sounds clear  CV:  Palpation and auscultation of heart done , rate and rhythm reg, no murmur, no rub or gallop, Edema none  ABDOMEN:  normal bowel sounds, soft,no hepatosplenomegaly or other masses  M/S:   Gait and station not observed. , Digits and nails normal   SKIN:  Inspection/Palpation of skin and subcutaneous tissue no rash  NEURO: 2-12 in normal limits and at patient's baseline  PSYCH:  insight and judgement, memory mildly impaired , affect and mood normal      Recent Labs:  CBC RESULTS:   Recent Labs   Lab Test 12/19/18  0525 12/06/18  0745   WBC 9.2 10.6   RBC 3.18* 3.15*   HGB 10.0* 9.8*   HCT 30.2* 30.1*   MCV 95 96   MCH 31.4 31.1   MCHC 33.1 32.6   RDW 13.4 14.0    489*       Last Basic Metabolic Panel:  Recent Labs   Lab Test 12/19/18  0525 12/06/18  0745   * 143   POTASSIUM 4.5 4.7   CHLORIDE 113* 113*   AGNIESZKA 8.4* 8.3*   CO2 26 23   BUN 50* 51*   CR 2.16* 2.08*   * 98           Assessment/Plan:  (R33.9) Urinary retention  Comment: abdi removed. PVR have been >350 for past 24h.     Plan:     (S72.145D) Closed nondisplaced intertrochanteric fracture of left femur with routine healing, subsequent encounter  (primary encounter diagnosis)  Comment: recent hospitalization due to left hip fracture. S/p ORIF. He is working with therapy but only walking about 26' with walker. He can transfer ok.   Plan: physical therapy. He will need a wheelchair for home.     (I10) Essential hypertension  Comment: adequate control  Plan: no change        (N18.4) CKD (chronic kidney disease) stage 4, GFR 15-29 ml/min (H)  Comment: at baseline due to nephrectomy.   Plan: avoid nephrotoxins    (I63.9) Cerebrovascular accident (CVA), unspecified mechanism (H)  Comment: cognitive impairment and mobility limitations.   Plan: physical therapy and OCCUPATIONAL THERAPY     (R53.81) Physical deconditioning  Comment: due to recent fall  with hip fracture. He is walking about 20-50' with therapy. TUG 68.5sec. He is high fall risk.   Plan: physical therapy and OCCUPATIONAL THERAPY         Electronically signed by  JUAN PABLO Moreland CNP            [place] [facility]    Chief Complaint   Patient presents with     RECHECK       [place] Medical Record Number:  [id number]  [facility][id number][facility]    HPI:    Sourav Fine is a 89 year old  (11/15/1925), who is being seen today for an episodic care visit.  HPI information obtained from: facility chart records, facility staff, patient report and [facility] chart review.Today's concern is:  Urinary retention  Patient came to TCU with valencia catheter. He did attend urology appointment on 12/11.  Valencia was removed He was prescribed a few days of cephelexin.   On 12/13 he c/o lower abd pain. Patient found to have 1000cc in bladder. Valencia replaced. He is on flomax; we did increase the dose. . He is discharging soon.   Closed nondisplaced intertrochanteric fracture of left femur with routine healing, subsequent encounter  Patient transferred to TCU from hospital following repair of left intertrochanteric femur fracture on 11/19. Surgery was uneventful. injury happened due to fall. Activity is WBAT. DVT prophylaxis is ASA. He has been working with therapy at [facility]. He is now walking 100-200' with walker. And SBA.     Essential hypertension  BP's in TCU: 166/74, 124/67, 154/77    CKD (chronic kidney disease) stage 4, GFR 15-29 ml/min (H)  H/o nephrectomy. Baseline creat 2.1-2.2  Lab Results   Component Value Date    CR 2.08 12/06/2018       Peripheral neuropathy- he continues on gabapentin at .   Cerebrovascular accident (CVA), unspecified mechanism (H)  S/p left frontal lobe infarction in 5/2018  OCCUPATIONAL THERAPY reports SLUMS 17/30 and CPT 4.5/5.6    Physical deconditioning  Lives with wife in 3rd floor apartment. Prior to  hospital stay he was independent with mobility. Therapy reports he is walking 26' with walker. CPT 4.5/5.6 and slums 17/30. Plans are being made for discharge later in week.   We did have a care conference today with therapy, SW, nursing, and wife and sons ( on phone).          ALLERGIES: Patient has no known allergies.  Past Medical, Surgical, Family and Social History reviewed and updated in [facility].    Current Outpatient Medications   Medication Sig Dispense Refill     ACETAMINOPHEN PO Take 650 mg by mouth every 6 hours as needed        amLODIPine (NORVASC) 2.5 MG tablet Take 1 tablet (2.5 mg) by mouth daily       aspirin 325 MG EC tablet Take 1 tablet (325 mg) by mouth daily 45 tablet 0     cyanocobalamin (VITAMIN  B-12) 1000 MCG tablet Take 1,000 mcg by mouth daily        gabapentin (NEURONTIN) 300 MG capsule Take 1,500 mg by mouth At Bedtime        loperamide (IMODIUM) 2 MG capsule Take 2 mg by mouth 2 times daily as needed for diarrhea       melatonin 5 MG tablet Take 5 mg by mouth nightly as needed for sleep       Multiple Vitamins-Minerals (PRESERVISION AREDS) TABS Take 1 tablet by mouth 2 times daily        senna-docusate (SENOKOT-S/PERICOLACE) 8.6-50 MG tablet Take 2 tablets by mouth 2 times daily as needed for constipation       tamsulosin (FLOMAX) 0.4 MG capsule Take 0.8 mg by mouth daily       Medications reviewed:  Medications reconciled to facility chart and changes were made to reflect current medications as identified as above med list. Below are the changes that were made:   Medications stopped since last EPIC medication reconciliation:   There are no discontinued medications.    Medications started since last [facility] medication reconciliation:  No orders of the defined types were placed in this encounter.      REVIEW OF SYSTEMS:  4 point ROS including Respiratory, CV, GI and , other than that noted in the HPI,  is negative    Physical Exam:  /74   Pulse 93   Temp 98.3  F (36.8  C)   Resp 16    Wt 80.6 kg (177 lb 9.6 oz)   SpO2 97%   BMI 25.48 kg/m     GENERAL APPEARANCE:  Alert, in no distress  ENT:  Mouth and posterior oropharynx normal, moist mucous membranes, hearing acuity [place]  EYES:  EOM, conjunctivae, lids, pupils and irises normal    RESP:  respiratory effort and palpation of chest normal, no respiratory distress,  CV:   Edema none    M/S:   Gait and station not observed. , Digits and nails normal   SKIN:  Inspection/Palpation of skin and subcutaneous tissue no rash  NEURO: 2-12 in normal limits and at patient's baseline  PSYCH:  insight and judgement, memory mildly impaired , affect and mood normal      Recent Labs:  CBC RESULTS:   Recent Labs   Lab Test 12/19/18  0525 12/06/18  0745   WBC 9.2 10.6   RBC 3.18* 3.15*   HGB 10.0* 9.8*   HCT 30.2* 30.1*   MCV 95 96   MCH 31.4 31.1   MCHC 33.1 32.6   RDW 13.4 14.0    489*       Last Basic Metabolic Panel:  Recent Labs   Lab Test 12/19/18  0525 12/06/18  0745   * 143   POTASSIUM 4.5 4.7   CHLORIDE 113* 113*   AGNIESZKA 8.4* 8.3*   CO2 26 23   BUN 50* 51*   CR 2.16* 2.08*   * 98           Assessment/Plan:  (R33.9) Urinary retention  Comment: abdi removed. PVR have been >350 for past 24h.     Plan: follow up with urology tomorrow. He may need abdi again.     (R63.823H) Closed nondisplaced intertrochanteric fracture of left femur with routine healing, subsequent encounter  (primary encounter diagnosis)  Comment: recent hospitalization due to left hip fracture. S/p ORIF. He is working with therapy but only walking about 26' with walker. He can transfer ok but needs SBA with mobility due to fall risk. Wheelchair has been ordered and is here.   Plan: physical therapy.     (I10) Essential hypertension  Comment: adequate control  Plan: no change        (N18.4) CKD (chronic kidney disease) stage 4, GFR 15-29 ml/min (H)  Comment: at baseline due to nephrectomy.   Plan: avoid nephrotoxins    (I63.9) Cerebrovascular accident (CVA), unspecified  mechanism (H)  Comment: cognitive impairment and mobility limitations.   Plan: physical therapy and OCCUPATIONAL THERAPY     (R53.81) Physical deconditioning  Comment: due to recent fall with hip fracture. He is walking about 20-50' with therapy. TUG 47sec. He is high fall risk.   Plan: physical therapy and OCCUPATIONAL THERAPY     Total time spent with patient visit was 44 mins including patient visit, review of past records, phone call to patient contact and care conference with two family members on phone and wife here and therapy, SW, nursing and patient . Greater than 50% of total time spent with counseling and coordinating care due to discussion of barriers to discharge; patient lives in independent living apartment at [address] [place] [address]. He requires assist with mobility and self cares due to high fall risk. Wife is not able to provide this assist. Sons plan on staying with patient and wife for next week until home care gets set up.   The other main issue is inability to void, he feels the urge but cannot start a stream. He has required str cath each shift since catheter removed. He does follow up urology 12/21. If abdi is placed then we will need to teach family how to take care of abdi.   We will send home care and family will set up private pay care, likely though [facility].     Electronically signed by  JUAN PABLO Moreland CNP              Sincerely,        JUAN PABLO Moreland CNP

## 2018-12-20 NOTE — PROGRESS NOTES
[place] [facility]    Chief Complaint   Patient presents with     ROSIOECK       [place] Medical Record Number:  [id number]  Place of Service where encounter took place:  [facility] [[id number]]    HPI:    Sourav Fine is a 89 year old  (11/15/1925), who is being seen today for an episodic care visit.  HPI information obtained from: facility chart records, facility staff, patient report and [facility] chart review.Today's concern is:  Urinary retention  Patient came to TCU with abdi catheter. He did attend urology appointment on 12/11.  Abdi was removed He was prescribed a few days of cephelexin.   On 12/13 he c/o lower abd pain. Patient found to have 1000cc in bladder. Abdi replaced. He is on flomax; we did increase the dose. . He is discharging soon.   Closed nondisplaced intertrochanteric fracture of left femur with routine healing, subsequent encounter  Patient transferred to TCU from hospital following repair of left intertrochanteric femur fracture on 11/19. Surgery was uneventful. injury happened due to fall. Activity is WBAT. DVT prophylaxis is ASA. He has been working with therapy at TCU. He is now walking 100-200' with walker. And SBA.     Essential hypertension  BP's in TCU: 166/74, 124/67, 154/77    CKD (chronic kidney disease) stage 4, GFR 15-29 ml/min (H)  H/o nephrectomy. Baseline creat 2.1-2.2  Lab Results   Component Value Date    CR 2.08 12/06/2018       Peripheral neuropathy- he continues on gabapentin at .   Cerebrovascular accident (CVA), unspecified mechanism (H)  S/p left frontal lobe infarction in 5/2018  OCCUPATIONAL THERAPY reports SLUMS 17/30 and CPT 4.5/5.6    Physical deconditioning  Lives with wife in 3rd floor apartment. Prior to hospital stay he was independent with mobility. Therapy reports he is walking 26' with walker. CPT 4.5/5.6 and slums 17/30. Plans are being made for discharge later in week.   We did have a care conference today with  therapy, SW, nursing, and wife and sons ( on phone).          ALLERGIES: Patient has no known allergies.  Past Medical, Surgical, Family and Social History reviewed and updated in [facility].    Current Outpatient Medications   Medication Sig Dispense Refill     ACETAMINOPHEN PO Take 650 mg by mouth every 6 hours as needed        amLODIPine (NORVASC) 2.5 MG tablet Take 1 tablet (2.5 mg) by mouth daily       aspirin 325 MG EC tablet Take 1 tablet (325 mg) by mouth daily 45 tablet 0     cyanocobalamin (VITAMIN  B-12) 1000 MCG tablet Take 1,000 mcg by mouth daily        gabapentin (NEURONTIN) 300 MG capsule Take 1,500 mg by mouth At Bedtime        loperamide (IMODIUM) 2 MG capsule Take 2 mg by mouth 2 times daily as needed for diarrhea       melatonin 5 MG tablet Take 5 mg by mouth nightly as needed for sleep       Multiple Vitamins-Minerals (PRESERVISION AREDS) TABS Take 1 tablet by mouth 2 times daily        senna-docusate (SENOKOT-S/PERICOLACE) 8.6-50 MG tablet Take 2 tablets by mouth 2 times daily as needed for constipation       tamsulosin (FLOMAX) 0.4 MG capsule Take 0.8 mg by mouth daily       Medications reviewed:  Medications reconciled to facility chart and changes were made to reflect current medications as identified as above med list. Below are the changes that were made:   Medications stopped since last EPIC medication reconciliation:   There are no discontinued medications.    Medications started since last [facility] medication reconciliation:  No orders of the defined types were placed in this encounter.      REVIEW OF SYSTEMS:  4 point ROS including Respiratory, CV, GI and , other than that noted in the HPI,  is negative    Physical Exam:  /74   Pulse 93   Temp 98.3  F (36.8  C)   Resp 16   Wt 80.6 kg (177 lb 9.6 oz)   SpO2 97%   BMI 25.48 kg/m    GENERAL APPEARANCE:  Alert, in no distress  ENT:  Mouth and posterior oropharynx normal, moist mucous membranes, hearing acuity [place]  EYES:  EOM,  conjunctivae, lids, pupils and irises normal    RESP:  respiratory effort and palpation of chest normal, no respiratory distress,  CV:   Edema none    M/S:   Gait and station not observed. , Digits and nails normal   SKIN:  Inspection/Palpation of skin and subcutaneous tissue no rash  NEURO: 2-12 in normal limits and at patient's baseline  PSYCH:  insight and judgement, memory mildly impaired , affect and mood normal      Recent Labs:  CBC RESULTS:   Recent Labs   Lab Test 12/19/18  0525 12/06/18  0745   WBC 9.2 10.6   RBC 3.18* 3.15*   HGB 10.0* 9.8*   HCT 30.2* 30.1*   MCV 95 96   MCH 31.4 31.1   MCHC 33.1 32.6   RDW 13.4 14.0    489*       Last Basic Metabolic Panel:  Recent Labs   Lab Test 12/19/18  0525 12/06/18  0745   * 143   POTASSIUM 4.5 4.7   CHLORIDE 113* 113*   AGNIESZKA 8.4* 8.3*   CO2 26 23   BUN 50* 51*   CR 2.16* 2.08*   * 98           Assessment/Plan:  (R33.9) Urinary retention  Comment: abdi removed. PVR have been >350 for past 24h.     Plan: follow up with urology tomorrow. He may need abdi again.     (S73.287B) Closed nondisplaced intertrochanteric fracture of left femur with routine healing, subsequent encounter  (primary encounter diagnosis)  Comment: recent hospitalization due to left hip fracture. S/p ORIF. He is working with therapy but only walking about 26' with walker. He can transfer ok but needs SBA with mobility due to fall risk. Wheelchair has been ordered and is here.   Plan: physical therapy.     (I10) Essential hypertension  Comment: adequate control  Plan: no change        (N18.4) CKD (chronic kidney disease) stage 4, GFR 15-29 ml/min (H)  Comment: at baseline due to nephrectomy.   Plan: avoid nephrotoxins    (I63.9) Cerebrovascular accident (CVA), unspecified mechanism (H)  Comment: cognitive impairment and mobility limitations.   Plan: physical therapy and OCCUPATIONAL THERAPY     (R53.81) Physical deconditioning  Comment: due to recent fall with hip fracture. He  is walking about 20-50' with therapy. TUG 47sec. He is high fall risk.   Plan: physical therapy and OCCUPATIONAL THERAPY     Total time spent with patient visit was 44 mins including patient visit, review of past records, phone call to patient contact and care conference with two family members on phone and wife here and therapy, SW, nursing and patient . Greater than 50% of total time spent with counseling and coordinating care due to discussion of barriers to discharge; patient lives in independent living apartment at [address] [place] [address]. He requires assist with mobility and self cares due to high fall risk. Wife is not able to provide this assist. Sons plan on staying with patient and wife for next week until home care gets set up.   The other main issue is inability to void, he feels the urge but cannot start a stream. He has required str cath each shift since catheter removed. He does follow up urology 12/21. If abdi is placed then we will need to teach family how to take care of abdi.   We will send home care and family will set up private pay care, likely though [facility].     Electronically signed by  JUAN PABLO Moreland CNP

## 2018-12-21 RX ORDER — TAMSULOSIN HYDROCHLORIDE 0.4 MG/1
0.8 CAPSULE ORAL DAILY
Qty: 30 CAPSULE | Refills: 0 | Status: SHIPPED | OUTPATIENT
Start: 2018-12-21

## 2020-08-04 NOTE — ANESTHESIA PREPROCEDURE EVALUATION
Anesthesia Evaluation     .             ROS/MED HX    ENT/Pulmonary:     (+)sleep apnea, uses CPAP , . .    Neurologic:     (+)CVA other neuro peripheral neuropathy    Cardiovascular: Comment: TTE in preop. Preserved LV function. Mild/mod AR    (+) hypertension----. : . . . :. .       METS/Exercise Tolerance:     Hematologic:  - neg hematologic  ROS       Musculoskeletal:  - neg musculoskeletal ROS (+) , fracture lower extremity: Femoral: Hip, -       GI/Hepatic:         Renal/Genitourinary:     (+) chronic renal disease, type: CRI,       Endo:  - neg endo ROS       Psychiatric:         Infectious Disease:  - neg infectious disease ROS       Malignancy:         Other:                                    Anesthesia Plan      History & Physical Review  History and physical reviewed and following examination; no interval change.    ASA Status:  4 .    NPO Status:  > 8 hours    Plan for General and ETT with Intravenous induction. Maintenance will be Balanced.    PONV prophylaxis:  Ondansetron (or other 5HT-3) and Dexamethasone or Solumedrol       Postoperative Care  Postoperative pain management:  IV analgesics.      Consents  Anesthetic plan, risks, benefits and alternatives discussed with:  Patient.  Use of blood products discussed: Yes.   Use of blood products discussed with Patient.  Consented to blood products.  .            Procedure: Procedure(s):  OPEN REDUCTION INTERNAL FIXATION LEFT HIP NAILING.(SYNTHES, C-ARM, FX TABLE.)  Preop diagnosis: INTERTROCHANTERIC HIP FRACTURE.    No Known Allergies  Past Medical History:   Diagnosis Date     Arthritis      Hypertension      Past Surgical History:   Procedure Laterality Date     ORTHOPEDIC SURGERY       Social History   Substance Use Topics     Smoking status: Former Smoker     Quit date: 1/1/1989     Smokeless tobacco: Never Used     Alcohol use Yes      Comment: a little wine daily     Prior to Admission medications    Medication Sig Start Date End Date Taking?  Authorizing Provider   amLODIPine (NORVASC) 5 MG tablet Take 5 mg by mouth daily  11/5/18  Yes Reported, Patient   aspirin 81 MG EC tablet Take 81 mg by mouth daily  10/30/18  Yes Reported, Patient   cyanocobalamin (VITAMIN  B-12) 1000 MCG tablet Take 1,000 mcg by mouth daily  10/30/18  Yes Reported, Patient   gabapentin (NEURONTIN) 300 MG capsule Take 1,500 mg by mouth At Bedtime  10/30/18  Yes Reported, Patient   loperamide (IMODIUM) 2 MG capsule Take 2 mg by mouth 2 times daily as needed for diarrhea   Yes Unknown, Entered By History   Multiple Vitamins-Minerals (PRESERVISION AREDS) TABS Take 1 tablet by mouth 2 times daily  10/30/18  Yes Reported, Patient     Current Facility-Administered Medications Ordered in Epic   Medication Dose Route Frequency Last Rate Last Dose     [Auto Hold] acetaminophen (TYLENOL) Suppository 650 mg  650 mg Rectal Q4H PRN         [Auto Hold] acetaminophen (TYLENOL) tablet 975 mg  975 mg Oral TID   975 mg at 11/19/18 1119     [Auto Hold] benztropine (COGENTIN) tablet 1-2 mg  1-2 mg Oral TID PRN         [Auto Hold] bisacodyl (DULCOLAX) Suppository 10 mg  10 mg Rectal Daily PRN         ceFAZolin (ANCEF) 1 g vial to attach to  ml bag for ADULT or 50 ml bag for PEDS  1 g Intravenous See Admin Instructions         ceFAZolin (ANCEF) intermittent infusion 2 g in 100 mL dextrose PRE-MIX  2 g Intravenous Pre-Op/Pre-procedure x 1 dose         dextrose 5% and 0.45% NaCl infusion   Intravenous Continuous 100 mL/hr at 11/19/18 1119       gabapentin (NEURONTIN) capsule 1,500 mg  1,500 mg Oral At Bedtime         [Auto Hold] HYDROmorphone (PF) (DILAUDID) injection 0.3-0.5 mg  0.3-0.5 mg Intravenous Q2H PRN         lactated ringers infusion   Intravenous Continuous         [Auto Hold] melatonin tablet 1 mg  1 mg Oral At Bedtime PRN         [Auto Hold] naloxone (NARCAN) injection 0.1-0.4 mg  0.1-0.4 mg Intravenous Q2 Min PRN         [Auto Hold] ondansetron (ZOFRAN-ODT) ODT tab 4 mg  4 mg Oral Q6H  PRN        Or     [Auto Hold] ondansetron (ZOFRAN) injection 4 mg  4 mg Intravenous Q6H PRN         [Auto Hold] oxyCODONE IR (ROXICODONE) tablet 5-10 mg  5-10 mg Oral Q3H PRN         [Auto Hold] polyethylene glycol (MIRALAX/GLYCOLAX) Packet 17 g  17 g Oral Daily PRN         [Auto Hold] prochlorperazine (COMPAZINE) injection 5 mg  5 mg Intravenous Q6H PRN        Or     [Auto Hold] prochlorperazine (COMPAZINE) tablet 5 mg  5 mg Oral Q6H PRN        Or     [Auto Hold] prochlorperazine (COMPAZINE) Suppository 12.5 mg  12.5 mg Rectal Q12H PRN         [Auto Hold] senna-docusate (SENOKOT-S;PERICOLACE) 8.6-50 MG per tablet 1 tablet  1 tablet Oral BID PRN        Or     [Auto Hold] senna-docusate (SENOKOT-S;PERICOLACE) 8.6-50 MG per tablet 2 tablet  2 tablet Oral BID PRN         No current [facility]-ordered outpatient prescriptions on file.       dextrose 5% and 0.45% NaCl 100 mL/hr at 11/19/18 1119     lactated ringers       Wt Readings from Last 1 Encounters:   11/19/18 81.6 kg (180 lb)     Temp Readings from Last 1 Encounters:   11/19/18 36.7  C (98  F) (Oral)     BP Readings from Last 6 Encounters:   11/19/18 134/75     Pulse Readings from Last 4 Encounters:   11/19/18 65     Resp Readings from Last 1 Encounters:   11/19/18 16     SpO2 Readings from Last 1 Encounters:   11/19/18 96%     Recent Labs   Lab Test  11/19/18   0746  12/13/12   1630   NA  145*   --    POTASSIUM  4.6   --    CHLORIDE  113*   --    CO2  26   --    ANIONGAP  6   --    GLC  111*   --    BUN  41*   --    CR  2.15*  1.69*   AGNIESZKA  8.0*   --      No results for input(s): AST, ALT, ALKPHOS, BILITOTAL, LIPASE in the last 32679 hours.  Recent Labs   Lab Test  11/19/18   0746   WBC  9.5   HGB  12.8*   PLT  214     No results for input(s): ABO, RH in the last 20362 hours.  No results for input(s): INR, PTT in the last 95711 hours.   No results for input(s): TROPI in the last 03849 hours.  No results for input(s): PH, PCO2, PO2, HCO3 in the last 81304 hours.  No  results for input(s): HCG in the last 42110 hours.  Recent Results (from the past 744 hour(s))   XR Chest 1 View    Narrative    CHEST ONE VIEW  11/19/2018 8:35 AM     HISTORY:  Trauma to left hip.     COMPARISON: 1/28/2006      Impression    IMPRESSION: Heart size upper normal but similar to prior. Pulmonary  vascularity within normal limits. The lungs are clear. No pneumothorax  or pleural effusion. There are lower left lateral rib fractures of  uncertain chronicity.    MALLORY BEAL MD   XR Pelvis w Hip Left 1 View    Narrative    PELVIS WITH LEFT HIP LATERAL TWO VIEWS  11/19/2018 8:35 AM     HISTORY: Left hip pain.     COMPARISON: None.      Impression    IMPRESSION:  Minimally displaced intertrochanteric fracture of the  left hip. There are degenerative changes of both hips, left worse than  right. Prostate seeds noted.    MALLORY BEAL MD   Head CT w/o contrast    Narrative    CT SCAN OF THE HEAD WITHOUT CONTRAST   11/19/2018 8:48 AM     HISTORY:  Head pain, fall.     TECHNIQUE:  Axial images of the head and coronal reformations without  IV contrast material. Radiation dose for this scan was reduced using  automated exposure control, adjustment of the mA and/or kV according  to patient size, or iterative reconstruction technique.    COMPARISON: None.    FINDINGS:  Mild volume loss is present. Geographic hypoattenuation and  volume loss within the left middle/inferior frontal gyrus is present  consistent with old infarct. No evidence of acute ischemia,  hemorrhage, mass, mass effect, or hydrocephalus. The visualized  calvarium, skull base, paranasal sinuses, and extracranial soft  tissues are unremarkable. Bilateral lens replacements are present.      Impression    IMPRESSION:  No acute intracranial abnormality. 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    PADDY LANDA MD       RECENT LABS:   ECG:   ECHO:                   .   stable

## (undated) DEVICE — GLOVE PROTEXIS W/NEU-THERA 7.0  2D73TE70

## (undated) DEVICE — SPONGE LAP 18X18" X8435

## (undated) DEVICE — PREP DURAPREP 26ML APL 8630

## (undated) DEVICE — CAST PADDING 4" UNSTERILE 9044

## (undated) DEVICE — SU VICRYL 2-0 CT-1 27" UND J259H

## (undated) DEVICE — SOL NACL 0.9% IRRIG 1000ML BOTTLE 07138-09

## (undated) DEVICE — BLADE CLIPPER 4412A

## (undated) DEVICE — PACK HIP NAILING SOP15HNSB

## (undated) DEVICE — GOWN IMPERVIOUS SPECIALTY XL/XLONG 39049

## (undated) DEVICE — ESU GROUND PAD UNIVERSAL W/O CORD

## (undated) DEVICE — CAST PADDING 4" COTTON WEBRIL UNSTERILE 9084

## (undated) DEVICE — GLOVE PROTEXIS POWDER FREE 7.5 ORTHOPEDIC 2D73ET75

## (undated) DEVICE — DRSG STERI STRIP 1/2X4" R1547

## (undated) DEVICE — SU VICRYL 0 CT-1 27" J340H

## (undated) DEVICE — GLOVE PROTEXIS W/NEU-THERA 8.0  2D73TE80

## (undated) DEVICE — ROD SYN REAMER BALL TIP 2.5X950MM  351.706S

## (undated) DEVICE — SOL WATER IRRIG 1000ML BOTTLE 2F7114

## (undated) DEVICE — DRSG ABDOMINAL 07 1/2X8" 7197D

## (undated) DEVICE — DRSG ADAPTIC 3X8" 6113

## (undated) DEVICE — GLOVE PROTEXIS BLUE W/NEU-THERA 7.0  2D73EB70

## (undated) DEVICE — GLOVE PROTEXIS POWDER FREE 6.5 ORTHOPEDIC 2D73ET65

## (undated) DEVICE — LINEN TOWEL PACK X5 5464

## (undated) DEVICE — WIRE GUIDE 3.2X400MM  357.399

## (undated) DEVICE — MANIFOLD NEPTUNE 4 PORT 700-20

## (undated) DEVICE — GLOVE PROTEXIS BLUE W/NEU-THERA 8.0  2D73EB80

## (undated) RX ORDER — GLYCOPYRROLATE 0.2 MG/ML
INJECTION, SOLUTION INTRAMUSCULAR; INTRAVENOUS
Status: DISPENSED
Start: 2018-11-19

## (undated) RX ORDER — LIDOCAINE HYDROCHLORIDE 20 MG/ML
INJECTION, SOLUTION EPIDURAL; INFILTRATION; INTRACAUDAL; PERINEURAL
Status: DISPENSED
Start: 2018-11-19

## (undated) RX ORDER — NEOSTIGMINE METHYLSULFATE 1 MG/ML
VIAL (ML) INJECTION
Status: DISPENSED
Start: 2018-11-19

## (undated) RX ORDER — PROPOFOL 10 MG/ML
INJECTION, EMULSION INTRAVENOUS
Status: DISPENSED
Start: 2018-11-19

## (undated) RX ORDER — FENTANYL CITRATE 50 UG/ML
INJECTION, SOLUTION INTRAMUSCULAR; INTRAVENOUS
Status: DISPENSED
Start: 2018-11-19

## (undated) RX ORDER — DEXAMETHASONE SODIUM PHOSPHATE 4 MG/ML
INJECTION, SOLUTION INTRA-ARTICULAR; INTRALESIONAL; INTRAMUSCULAR; INTRAVENOUS; SOFT TISSUE
Status: DISPENSED
Start: 2018-11-19

## (undated) RX ORDER — HYDROMORPHONE HYDROCHLORIDE 1 MG/ML
INJECTION, SOLUTION INTRAMUSCULAR; INTRAVENOUS; SUBCUTANEOUS
Status: DISPENSED
Start: 2018-11-19

## (undated) RX ORDER — ONDANSETRON 2 MG/ML
INJECTION INTRAMUSCULAR; INTRAVENOUS
Status: DISPENSED
Start: 2018-11-19